# Patient Record
Sex: MALE | Race: BLACK OR AFRICAN AMERICAN | NOT HISPANIC OR LATINO | Employment: OTHER | URBAN - NONMETROPOLITAN AREA
[De-identification: names, ages, dates, MRNs, and addresses within clinical notes are randomized per-mention and may not be internally consistent; named-entity substitution may affect disease eponyms.]

---

## 2024-11-19 DIAGNOSIS — R07.9 CHEST PAIN, UNSPECIFIED: Primary | ICD-10-CM

## 2024-12-06 ENCOUNTER — HOSPITAL ENCOUNTER (OUTPATIENT)
Dept: RADIOLOGY | Facility: HOSPITAL | Age: 74
Discharge: HOME | End: 2024-12-06
Attending: INTERNAL MEDICINE
Payer: MEDICARE

## 2024-12-06 ENCOUNTER — CLINICAL SUPPORT (OUTPATIENT)
Dept: CARDIOLOGY | Facility: HOSPITAL | Age: 74
End: 2024-12-06
Attending: INTERNAL MEDICINE
Payer: MEDICARE

## 2024-12-06 DIAGNOSIS — R07.9 CHEST PAIN, UNSPECIFIED: ICD-10-CM

## 2024-12-06 LAB
CV STRESS BASE HR: 69 BPM
DIASTOLIC BLOOD PRESSURE: 94 MMHG
OHS CV CPX 85 PERCENT MAX PREDICTED HEART RATE MALE: 124
OHS CV CPX MAX PREDICTED HEART RATE: 146
OHS CV CPX PATIENT IS FEMALE: 0
OHS CV CPX PATIENT IS MALE: 1
OHS CV CPX PEAK DIASTOLIC BLOOD PRESSURE: 92 MMHG
OHS CV CPX PEAK HEAR RATE: 113 BPM
OHS CV CPX PEAK RATE PRESSURE PRODUCT: NORMAL
OHS CV CPX PEAK SYSTOLIC BLOOD PRESSURE: 154 MMHG
OHS CV CPX PERCENT MAX PREDICTED HEART RATE ACHIEVED: 77
OHS CV CPX RATE PRESSURE PRODUCT PRESENTING: NORMAL
SYSTOLIC BLOOD PRESSURE: 164 MMHG

## 2024-12-06 PROCEDURE — A9500 TC99M SESTAMIBI: HCPCS | Performed by: INTERNAL MEDICINE

## 2024-12-06 PROCEDURE — 78452 HT MUSCLE IMAGE SPECT MULT: CPT

## 2024-12-06 PROCEDURE — 93017 CV STRESS TEST TRACING ONLY: CPT

## 2024-12-06 RX ORDER — TETRAKIS(2-METHOXYISOBUTYLISOCYANIDE)COPPER(I) TETRAFLUOROBORATE 1 MG/ML
28.5 INJECTION, POWDER, LYOPHILIZED, FOR SOLUTION INTRAVENOUS
Status: COMPLETED | OUTPATIENT
Start: 2024-12-06 | End: 2024-12-06

## 2024-12-06 RX ORDER — TETRAKIS(2-METHOXYISOBUTYLISOCYANIDE)COPPER(I) TETRAFLUOROBORATE 1 MG/ML
9.7 INJECTION, POWDER, LYOPHILIZED, FOR SOLUTION INTRAVENOUS
Status: COMPLETED | OUTPATIENT
Start: 2024-12-06 | End: 2024-12-06

## 2024-12-06 RX ADMIN — KIT FOR THE PREPARATION OF TECHNETIUM TC99M SESTAMIBI 9.7 MILLICURIE: 1 INJECTION, POWDER, LYOPHILIZED, FOR SOLUTION PARENTERAL at 08:12

## 2024-12-06 RX ADMIN — KIT FOR THE PREPARATION OF TECHNETIUM TC99M SESTAMIBI 28.5 MILLICURIE: 1 INJECTION, POWDER, LYOPHILIZED, FOR SOLUTION PARENTERAL at 09:12

## 2024-12-08 LAB
CV STRESS BASE HR: 69 BPM
DIASTOLIC BLOOD PRESSURE: 94 MMHG
NUC REST EJECTION FRACTION: 35
OHS CV CPX 85 PERCENT MAX PREDICTED HEART RATE MALE: 124
OHS CV CPX ESTIMATED METS: 6
OHS CV CPX MAX PREDICTED HEART RATE: 146
OHS CV CPX PATIENT IS FEMALE: 0
OHS CV CPX PATIENT IS MALE: 1
OHS CV CPX PEAK DIASTOLIC BLOOD PRESSURE: 92 MMHG
OHS CV CPX PEAK HEAR RATE: 113 BPM
OHS CV CPX PEAK RATE PRESSURE PRODUCT: NORMAL
OHS CV CPX PEAK SYSTOLIC BLOOD PRESSURE: 154 MMHG
OHS CV CPX PERCENT MAX PREDICTED HEART RATE ACHIEVED: 77
OHS CV CPX RATE PRESSURE PRODUCT PRESENTING: NORMAL
STRESS ECHO POST EXERCISE DUR MIN: 5 MINUTES
SYSTOLIC BLOOD PRESSURE: 164 MMHG

## 2024-12-31 ENCOUNTER — TELEPHONE (OUTPATIENT)
Dept: CARDIOLOGY | Facility: CLINIC | Age: 74
End: 2024-12-31
Payer: MEDICARE

## 2024-12-31 NOTE — TELEPHONE ENCOUNTER
Received requests and records from Dr. Ybarra' Staff to schedule patient for Heart Cath with Dr. Gomes  Will scan all records to Media

## 2025-01-02 NOTE — TELEPHONE ENCOUNTER
Mr. Bautista returned call to confirm scheduling Heart Cath with Dr. Gomes, answered questions and advised needs current Lab work  Patient will contact office and will tentatively schedule Heart cath for Monday at 11am

## 2025-01-03 ENCOUNTER — LAB VISIT (OUTPATIENT)
Dept: LAB | Facility: HOSPITAL | Age: 75
End: 2025-01-03
Attending: INTERNAL MEDICINE
Payer: MEDICARE

## 2025-01-03 DIAGNOSIS — R94.31 NONSPECIFIC ABNORMAL ELECTROCARDIOGRAM (ECG) (EKG): ICD-10-CM

## 2025-01-03 DIAGNOSIS — R06.02 SHORTNESS OF BREATH: Primary | ICD-10-CM

## 2025-01-03 LAB
ALBUMIN SERPL BCP-MCNC: 3.5 G/DL (ref 3.5–5.2)
ALP SERPL-CCNC: 109 U/L (ref 55–135)
ALT SERPL W/O P-5'-P-CCNC: 28 U/L (ref 10–44)
ANION GAP SERPL CALC-SCNC: 1 MMOL/L (ref 3–11)
APTT PPP: 26.8 SEC (ref 21–32)
AST SERPL-CCNC: 21 U/L (ref 10–40)
BASOPHILS # BLD AUTO: 0.05 K/UL (ref 0–0.2)
BASOPHILS NFR BLD: 0.7 % (ref 0–1.9)
BILIRUB SERPL-MCNC: 0.4 MG/DL (ref 0.1–1)
BUN SERPL-MCNC: 17 MG/DL (ref 8–23)
CALCIUM SERPL-MCNC: 9.8 MG/DL (ref 8.7–10.5)
CHLORIDE SERPL-SCNC: 106 MMOL/L (ref 95–110)
CO2 SERPL-SCNC: 33 MMOL/L (ref 23–29)
CREAT SERPL-MCNC: 1.9 MG/DL (ref 0.5–1.4)
DIFFERENTIAL METHOD BLD: ABNORMAL
EOSINOPHIL # BLD AUTO: 0.2 K/UL (ref 0–0.5)
EOSINOPHIL NFR BLD: 2.6 % (ref 0–8)
ERYTHROCYTE [DISTWIDTH] IN BLOOD BY AUTOMATED COUNT: 13.6 % (ref 11.5–14.5)
EST. GFR  (NO RACE VARIABLE): 36.6 ML/MIN/1.73 M^2
GLUCOSE SERPL-MCNC: 147 MG/DL (ref 70–110)
HCT VFR BLD AUTO: 44.3 % (ref 40–54)
HGB BLD-MCNC: 15 G/DL (ref 14–18)
IMM GRANULOCYTES # BLD AUTO: 0.01 K/UL (ref 0–0.04)
IMM GRANULOCYTES NFR BLD AUTO: 0.1 % (ref 0–0.5)
INR PPP: 1 (ref 0.8–1.2)
LYMPHOCYTES # BLD AUTO: 1.9 K/UL (ref 1–4.8)
LYMPHOCYTES NFR BLD: 24.2 % (ref 18–48)
MCH RBC QN AUTO: 34.2 PG (ref 27–31)
MCHC RBC AUTO-ENTMCNC: 33.9 G/DL (ref 32–36)
MCV RBC AUTO: 101 FL (ref 82–98)
MONOCYTES # BLD AUTO: 0.4 K/UL (ref 0.3–1)
MONOCYTES NFR BLD: 5.8 % (ref 4–15)
NEUTROPHILS # BLD AUTO: 5.1 K/UL (ref 1.8–7.7)
NEUTROPHILS NFR BLD: 66.6 % (ref 38–73)
NRBC BLD-RTO: 0 /100 WBC
PLATELET # BLD AUTO: 209 K/UL (ref 150–450)
PMV BLD AUTO: 9.3 FL (ref 9.2–12.9)
POTASSIUM SERPL-SCNC: 3.7 MMOL/L (ref 3.5–5.1)
PROT SERPL-MCNC: 6.9 G/DL (ref 6–8.4)
PROTHROMBIN TIME: 10.5 SEC (ref 9–12.5)
RBC # BLD AUTO: 4.38 M/UL (ref 4.6–6.2)
SODIUM SERPL-SCNC: 140 MMOL/L (ref 136–145)
WBC # BLD AUTO: 7.63 K/UL (ref 3.9–12.7)

## 2025-01-03 PROCEDURE — 85730 THROMBOPLASTIN TIME PARTIAL: CPT | Performed by: INTERNAL MEDICINE

## 2025-01-03 PROCEDURE — 80053 COMPREHEN METABOLIC PANEL: CPT | Performed by: INTERNAL MEDICINE

## 2025-01-03 PROCEDURE — 85610 PROTHROMBIN TIME: CPT | Performed by: INTERNAL MEDICINE

## 2025-01-03 PROCEDURE — 85025 COMPLETE CBC W/AUTO DIFF WBC: CPT | Performed by: INTERNAL MEDICINE

## 2025-01-03 PROCEDURE — 36415 COLL VENOUS BLD VENIPUNCTURE: CPT | Performed by: INTERNAL MEDICINE

## 2025-01-03 RX ORDER — METHIMAZOLE 5 MG/1
10 TABLET ORAL DAILY
COMMUNITY
Start: 2024-11-02

## 2025-01-03 RX ORDER — ASPIRIN 81 MG/1
81 TABLET ORAL DAILY
COMMUNITY

## 2025-01-03 RX ORDER — NITROGLYCERIN 400 UG/1
1 SPRAY ORAL EVERY 5 MIN PRN
COMMUNITY
Start: 2024-12-23

## 2025-01-03 RX ORDER — NIFEDIPINE 60 MG/1
60 TABLET, EXTENDED RELEASE ORAL EVERY MORNING
Status: ON HOLD | COMMUNITY
Start: 2024-12-02 | End: 2025-01-11 | Stop reason: HOSPADM

## 2025-01-03 RX ORDER — NIFEDIPINE 30 MG/1
30 TABLET, EXTENDED RELEASE ORAL NIGHTLY
COMMUNITY
Start: 2024-12-21

## 2025-01-03 RX ORDER — METOPROLOL SUCCINATE 50 MG/1
50 TABLET, EXTENDED RELEASE ORAL DAILY
COMMUNITY
Start: 2024-12-02

## 2025-01-03 RX ORDER — HYDRALAZINE HYDROCHLORIDE 25 MG/1
25 TABLET, FILM COATED ORAL 3 TIMES DAILY
Status: ON HOLD | COMMUNITY
Start: 2024-11-01 | End: 2025-01-11 | Stop reason: HOSPADM

## 2025-01-03 RX ORDER — ISOSORBIDE DINITRATE AND HYDRALAZINE HYDROCHLORIDE 37.5; 2 MG/1; MG/1
1 TABLET ORAL 3 TIMES DAILY
COMMUNITY
Start: 2024-12-17

## 2025-01-03 RX ORDER — ISOSORBIDE MONONITRATE 30 MG/1
30 TABLET, EXTENDED RELEASE ORAL DAILY
Status: ON HOLD | COMMUNITY
Start: 2024-12-11 | End: 2025-01-11 | Stop reason: HOSPADM

## 2025-01-03 RX ORDER — LOSARTAN POTASSIUM 100 MG/1
100 TABLET ORAL DAILY
COMMUNITY
Start: 2024-12-25

## 2025-01-03 RX ORDER — MULTIVITAMIN WITH IRON
1 TABLET ORAL DAILY
COMMUNITY

## 2025-01-03 NOTE — TELEPHONE ENCOUNTER
Left voice mail for call back to review arrival time and stress drinking extra water over the weekend  Arrival time 830am -930am at the latest

## 2025-01-05 PROBLEM — Z95.5 PRESENCE OF DRUG COATED STENT IN LAD CORONARY ARTERY: Status: ACTIVE | Noted: 2025-01-05

## 2025-01-05 PROBLEM — R94.39 ABNORMAL NUCLEAR STRESS TEST: Status: ACTIVE | Noted: 2025-01-05

## 2025-01-05 PROBLEM — I65.23 BILATERAL CAROTID ARTERY STENOSIS: Status: ACTIVE | Noted: 2025-01-05

## 2025-01-05 PROBLEM — E78.49 OTHER HYPERLIPIDEMIA: Status: ACTIVE | Noted: 2025-01-05

## 2025-01-05 PROBLEM — I25.10 CORONARY ARTERY DISEASE INVOLVING NATIVE CORONARY ARTERY OF NATIVE HEART WITHOUT ANGINA PECTORIS: Status: ACTIVE | Noted: 2025-01-05

## 2025-01-05 PROBLEM — I10 PRIMARY HYPERTENSION: Status: ACTIVE | Noted: 2025-01-05

## 2025-01-06 ENCOUNTER — HOSPITAL ENCOUNTER (INPATIENT)
Facility: HOSPITAL | Age: 75
LOS: 3 days | Discharge: HOME HEALTH | DRG: 234 | End: 2025-01-11
Attending: INTERNAL MEDICINE | Admitting: THORACIC SURGERY (CARDIOTHORACIC VASCULAR SURGERY)
Payer: MEDICARE

## 2025-01-06 DIAGNOSIS — Z98.890 POST-OPERATIVE STATE: ICD-10-CM

## 2025-01-06 DIAGNOSIS — I25.10 CAD, MULTIPLE VESSEL: ICD-10-CM

## 2025-01-06 DIAGNOSIS — R94.31 ABNORMAL EKG: ICD-10-CM

## 2025-01-06 DIAGNOSIS — I47.10 PSVT (PAROXYSMAL SUPRAVENTRICULAR TACHYCARDIA): ICD-10-CM

## 2025-01-06 DIAGNOSIS — I25.110 CORONARY ARTERY DISEASE INVOLVING NATIVE CORONARY ARTERY OF NATIVE HEART WITH UNSTABLE ANGINA PECTORIS: ICD-10-CM

## 2025-01-06 DIAGNOSIS — Z95.5 S/P CORONARY ARTERY STENT PLACEMENT: ICD-10-CM

## 2025-01-06 DIAGNOSIS — Z95.1 S/P CABG X 4: ICD-10-CM

## 2025-01-06 DIAGNOSIS — Z01.810 PRE-OPERATIVE CARDIOVASCULAR EXAMINATION: ICD-10-CM

## 2025-01-06 DIAGNOSIS — I10 HYPERTENSION, UNSPECIFIED TYPE: ICD-10-CM

## 2025-01-06 DIAGNOSIS — R94.39 ABNORMAL NUCLEAR STRESS TEST: Primary | ICD-10-CM

## 2025-01-06 DIAGNOSIS — I48.91 A-FIB: ICD-10-CM

## 2025-01-06 DIAGNOSIS — I48.0 PAF (PAROXYSMAL ATRIAL FIBRILLATION): ICD-10-CM

## 2025-01-06 DIAGNOSIS — Z98.61 S/P PTCA (PERCUTANEOUS TRANSLUMINAL CORONARY ANGIOPLASTY): ICD-10-CM

## 2025-01-06 DIAGNOSIS — R94.31 ABNORMAL HOLTER MONITOR FINDING: ICD-10-CM

## 2025-01-06 LAB
ANION GAP SERPL CALC-SCNC: 12 MMOL/L (ref 8–16)
APTT PPP: 27.4 SEC (ref 21–32)
BUN SERPL-MCNC: 21 MG/DL (ref 8–23)
CALCIUM SERPL-MCNC: 10.4 MG/DL (ref 8.7–10.5)
CATH EF QUANTITATIVE: 60 %
CHLORIDE SERPL-SCNC: 106 MMOL/L (ref 95–110)
CO2 SERPL-SCNC: 22 MMOL/L (ref 23–29)
CREAT SERPL-MCNC: 1.8 MG/DL (ref 0.5–1.4)
EST. GFR  (NO RACE VARIABLE): 39 ML/MIN/1.73 M^2
GLUCOSE SERPL-MCNC: 115 MG/DL (ref 70–110)
OHS QRS DURATION: 108 MS
OHS QTC CALCULATION: 431 MS
POTASSIUM SERPL-SCNC: 3.7 MMOL/L (ref 3.5–5.1)
SODIUM SERPL-SCNC: 140 MMOL/L (ref 136–145)

## 2025-01-06 PROCEDURE — C1887 CATHETER, GUIDING: HCPCS | Performed by: INTERNAL MEDICINE

## 2025-01-06 PROCEDURE — 80048 BASIC METABOLIC PNL TOTAL CA: CPT | Performed by: INTERNAL MEDICINE

## 2025-01-06 PROCEDURE — 63600175 PHARM REV CODE 636 W HCPCS: Performed by: INTERNAL MEDICINE

## 2025-01-06 PROCEDURE — 99152 MOD SED SAME PHYS/QHP 5/>YRS: CPT | Performed by: INTERNAL MEDICINE

## 2025-01-06 PROCEDURE — 93459 L HRT ART/GRFT ANGIO: CPT | Performed by: INTERNAL MEDICINE

## 2025-01-06 PROCEDURE — 25000003 PHARM REV CODE 250: Performed by: INTERNAL MEDICINE

## 2025-01-06 PROCEDURE — 4A023N7 MEASUREMENT OF CARDIAC SAMPLING AND PRESSURE, LEFT HEART, PERCUTANEOUS APPROACH: ICD-10-PCS | Performed by: INTERNAL MEDICINE

## 2025-01-06 PROCEDURE — 25500020 PHARM REV CODE 255: Performed by: INTERNAL MEDICINE

## 2025-01-06 PROCEDURE — 36415 COLL VENOUS BLD VENIPUNCTURE: CPT | Performed by: THORACIC SURGERY (CARDIOTHORACIC VASCULAR SURGERY)

## 2025-01-06 PROCEDURE — 27000221 HC OXYGEN, UP TO 24 HOURS

## 2025-01-06 PROCEDURE — S4991 NICOTINE PATCH NONLEGEND: HCPCS | Performed by: INTERNAL MEDICINE

## 2025-01-06 PROCEDURE — 99152 MOD SED SAME PHYS/QHP 5/>YRS: CPT | Mod: ,,, | Performed by: INTERNAL MEDICINE

## 2025-01-06 PROCEDURE — C1894 INTRO/SHEATH, NON-LASER: HCPCS | Performed by: INTERNAL MEDICINE

## 2025-01-06 PROCEDURE — 85730 THROMBOPLASTIN TIME PARTIAL: CPT | Performed by: THORACIC SURGERY (CARDIOTHORACIC VASCULAR SURGERY)

## 2025-01-06 PROCEDURE — 94761 N-INVAS EAR/PLS OXIMETRY MLT: CPT

## 2025-01-06 PROCEDURE — 93459 L HRT ART/GRFT ANGIO: CPT | Mod: 26,,, | Performed by: INTERNAL MEDICINE

## 2025-01-06 PROCEDURE — C1769 GUIDE WIRE: HCPCS | Performed by: INTERNAL MEDICINE

## 2025-01-06 PROCEDURE — 99153 MOD SED SAME PHYS/QHP EA: CPT | Performed by: INTERNAL MEDICINE

## 2025-01-06 PROCEDURE — B3121ZZ FLUOROSCOPY OF LEFT SUBCLAVIAN ARTERY USING LOW OSMOLAR CONTRAST: ICD-10-PCS | Performed by: INTERNAL MEDICINE

## 2025-01-06 PROCEDURE — B2111ZZ FLUOROSCOPY OF MULTIPLE CORONARY ARTERIES USING LOW OSMOLAR CONTRAST: ICD-10-PCS | Performed by: INTERNAL MEDICINE

## 2025-01-06 RX ORDER — HEPARIN SODIUM 1000 [USP'U]/ML
INJECTION, SOLUTION INTRAVENOUS; SUBCUTANEOUS
Status: DISCONTINUED | OUTPATIENT
Start: 2025-01-06 | End: 2025-01-06

## 2025-01-06 RX ORDER — LABETALOL HYDROCHLORIDE 5 MG/ML
10 INJECTION, SOLUTION INTRAVENOUS ONCE
Status: COMPLETED | OUTPATIENT
Start: 2025-01-06 | End: 2025-01-06

## 2025-01-06 RX ORDER — HYDRALAZINE HYDROCHLORIDE 20 MG/ML
10 INJECTION INTRAMUSCULAR; INTRAVENOUS ONCE
Status: COMPLETED | OUTPATIENT
Start: 2025-01-06 | End: 2025-01-06

## 2025-01-06 RX ORDER — NITROGLYCERIN 5 MG/ML
INJECTION, SOLUTION INTRAVENOUS
Status: DISCONTINUED | OUTPATIENT
Start: 2025-01-06 | End: 2025-01-06

## 2025-01-06 RX ORDER — ASPIRIN 81 MG/1
81 TABLET ORAL DAILY
Status: DISCONTINUED | OUTPATIENT
Start: 2025-01-07 | End: 2025-01-07 | Stop reason: SDUPTHER

## 2025-01-06 RX ORDER — ISOSORBIDE MONONITRATE 30 MG/1
30 TABLET, EXTENDED RELEASE ORAL DAILY
Status: DISCONTINUED | OUTPATIENT
Start: 2025-01-07 | End: 2025-01-09

## 2025-01-06 RX ORDER — LIDOCAINE HYDROCHLORIDE 20 MG/ML
INJECTION, SOLUTION INFILTRATION; PERINEURAL
Status: DISCONTINUED | OUTPATIENT
Start: 2025-01-06 | End: 2025-01-06

## 2025-01-06 RX ORDER — METHIMAZOLE 5 MG/1
10 TABLET ORAL DAILY
Status: DISCONTINUED | OUTPATIENT
Start: 2025-01-07 | End: 2025-01-11 | Stop reason: HOSPADM

## 2025-01-06 RX ORDER — MIDAZOLAM HYDROCHLORIDE 1 MG/ML
INJECTION, SOLUTION INTRAMUSCULAR; INTRAVENOUS
Status: DISCONTINUED | OUTPATIENT
Start: 2025-01-06 | End: 2025-01-06

## 2025-01-06 RX ORDER — DIPHENHYDRAMINE HCL 50 MG
50 CAPSULE ORAL ONCE
Status: COMPLETED | OUTPATIENT
Start: 2025-01-06 | End: 2025-01-06

## 2025-01-06 RX ORDER — HYDRALAZINE HYDROCHLORIDE 25 MG/1
25 TABLET, FILM COATED ORAL 3 TIMES DAILY
Status: DISCONTINUED | OUTPATIENT
Start: 2025-01-06 | End: 2025-01-09

## 2025-01-06 RX ORDER — ONDANSETRON 8 MG/1
8 TABLET, ORALLY DISINTEGRATING ORAL EVERY 8 HOURS PRN
Status: DISCONTINUED | OUTPATIENT
Start: 2025-01-06 | End: 2025-01-11 | Stop reason: HOSPADM

## 2025-01-06 RX ORDER — NITROGLYCERIN 400 UG/1
1 SPRAY ORAL EVERY 5 MIN PRN
Status: DISCONTINUED | OUTPATIENT
Start: 2025-01-06 | End: 2025-01-11 | Stop reason: HOSPADM

## 2025-01-06 RX ORDER — VERAPAMIL HYDROCHLORIDE 2.5 MG/ML
INJECTION, SOLUTION INTRAVENOUS
Status: DISCONTINUED | OUTPATIENT
Start: 2025-01-06 | End: 2025-01-06

## 2025-01-06 RX ORDER — SODIUM CHLORIDE 9 MG/ML
INJECTION, SOLUTION INTRAVENOUS CONTINUOUS
Status: DISCONTINUED | OUTPATIENT
Start: 2025-01-06 | End: 2025-01-06

## 2025-01-06 RX ORDER — SODIUM CHLORIDE 0.9 % (FLUSH) 0.9 %
10 SYRINGE (ML) INJECTION
Status: DISCONTINUED | OUTPATIENT
Start: 2025-01-06 | End: 2025-01-11 | Stop reason: HOSPADM

## 2025-01-06 RX ORDER — IBUPROFEN 200 MG
1 TABLET ORAL DAILY
Status: DISCONTINUED | OUTPATIENT
Start: 2025-01-07 | End: 2025-01-06

## 2025-01-06 RX ORDER — SODIUM CHLORIDE 9 MG/ML
INJECTION, SOLUTION INTRAVENOUS CONTINUOUS
Status: DISCONTINUED | OUTPATIENT
Start: 2025-01-06 | End: 2025-01-07

## 2025-01-06 RX ORDER — ACETAMINOPHEN 325 MG/1
650 TABLET ORAL EVERY 4 HOURS PRN
Status: DISCONTINUED | OUTPATIENT
Start: 2025-01-06 | End: 2025-01-07 | Stop reason: SDUPTHER

## 2025-01-06 RX ORDER — IBUPROFEN 200 MG
1 TABLET ORAL DAILY
Status: COMPLETED | OUTPATIENT
Start: 2025-01-06 | End: 2025-01-07

## 2025-01-06 RX ORDER — NIFEDIPINE 30 MG/1
60 TABLET, EXTENDED RELEASE ORAL DAILY
Status: DISCONTINUED | OUTPATIENT
Start: 2025-01-07 | End: 2025-01-09

## 2025-01-06 RX ORDER — LOSARTAN POTASSIUM 50 MG/1
100 TABLET ORAL DAILY
Status: DISCONTINUED | OUTPATIENT
Start: 2025-01-07 | End: 2025-01-09

## 2025-01-06 RX ORDER — METOPROLOL SUCCINATE 50 MG/1
50 TABLET, EXTENDED RELEASE ORAL DAILY
Status: DISCONTINUED | OUTPATIENT
Start: 2025-01-07 | End: 2025-01-07 | Stop reason: SDUPTHER

## 2025-01-06 RX ORDER — FENTANYL CITRATE 50 UG/ML
INJECTION, SOLUTION INTRAMUSCULAR; INTRAVENOUS
Status: DISCONTINUED | OUTPATIENT
Start: 2025-01-06 | End: 2025-01-06

## 2025-01-06 RX ORDER — NAPROXEN SODIUM 220 MG/1
81 TABLET, FILM COATED ORAL ONCE
Status: COMPLETED | OUTPATIENT
Start: 2025-01-06 | End: 2025-01-06

## 2025-01-06 RX ADMIN — ASPIRIN 81 MG CHEWABLE TABLET 81 MG: 81 TABLET CHEWABLE at 09:01

## 2025-01-06 RX ADMIN — NICOTINE 1 PATCH: 14 PATCH, EXTENDED RELEASE TRANSDERMAL at 11:01

## 2025-01-06 RX ADMIN — SODIUM CHLORIDE: 9 INJECTION, SOLUTION INTRAVENOUS at 09:01

## 2025-01-06 RX ADMIN — LABETALOL HYDROCHLORIDE 10 MG: 5 INJECTION INTRAVENOUS at 05:01

## 2025-01-06 RX ADMIN — DIPHENHYDRAMINE HYDROCHLORIDE 50 MG: 50 CAPSULE ORAL at 09:01

## 2025-01-06 RX ADMIN — SODIUM CHLORIDE: 9 INJECTION, SOLUTION INTRAVENOUS at 06:01

## 2025-01-06 RX ADMIN — HYDRALAZINE HYDROCHLORIDE 25 MG: 25 TABLET ORAL at 09:01

## 2025-01-06 RX ADMIN — HYDRALAZINE HYDROCHLORIDE 10 MG: 20 INJECTION INTRAMUSCULAR; INTRAVENOUS at 05:01

## 2025-01-06 NOTE — OP NOTE
INPATIENT Operative Note         SUMMARY     Surgery Date: 1/6/2025     Surgeons and Role:     * Erika Gomes MD - Primary    ASSISTANT:    Pre-op Diagnosis:  Abnormal nuclear stress test [R94.39]  Abnormal EKG [R94.31]  Coronary artery disease involving native coronary artery of native heart with unstable angina pectoris [I25.110]  S/P PTCA (percutaneous transluminal coronary angioplasty) [Z98.61]  S/P coronary artery stent placement [Z95.5]  PSVT (paroxysmal supraventricular tachycardia) [I47.10]  Abnormal Holter monitor finding [R94.31]  CAD, multiple vessel [I25.10]  Hypertension, unspecified type [I10]      Post-op Diagnosis:  Abnormal nuclear stress test [R94.39]  Abnormal EKG [R94.31]  Coronary artery disease involving native coronary artery of native heart with unstable angina pectoris [I25.110]  S/P PTCA (percutaneous transluminal coronary angioplasty) [Z98.61]  S/P coronary artery stent placement [Z95.5]  PSVT (paroxysmal supraventricular tachycardia) [I47.10]  Abnormal Holter monitor finding [R94.31]  CAD, multiple vessel [I25.10]  Hypertension, unspecified type [I10]    Procedure(s) (LRB):  Left heart cath (Left)  Angiogram Internal Mammary (Left)  ARTERIOGRAM, SUBCLAVIAN (Left)    COMPLICATION:none    Anesthesia: RN IV Sedation    Findings/Key Components:  Severe 3 vessel cad normal lvf.      Estimated Blood Loss: < 50 ML.         SPECIMEN: NONE    Devices/Prostetics: None    PLAN: cabg

## 2025-01-06 NOTE — H&P
Admit Note  Cardiology      SUBJECTIVE:     History of Present Illness:  Patient is a 74 y.o. male presents with H/O CAD HTN HLP TOBACCO USE PALPITATION NSVT BY HOLTER HAD AN ABNORMAL CARDIOLITE .WITH SIGNIFICANT DEFECT IN LAD DISTRIBUTION. HE WAS REFERED FOR CATH DY DR GOOD.    Past Medical History:   Diagnosis Date    Abnormal nuclear stress test 01/05/2025    Bilateral carotid artery stenosis 01/05/2025    CAD S/P percutaneous coronary angioplasty 2003    Stent mid LAD July 15, 2003    Carotid artery disease     Coronary artery disease involving native coronary artery of native heart without angina pectoris 01/05/2025    Diastolic dysfunction     Essential (primary) hypertension     Resistent    Graves disease     hyperthroidism    Hyperlipidemia     Hypertensive heart disease     Other hyperlipidemia 01/05/2025    Presence of drug coated stent in LAD coronary artery 01/05/2025    Primary hypertension 01/05/2025     Past Surgical History:   Procedure Laterality Date    ptca with Stent mid LAD in 2003 N/A      No family history on file.        Review of patient's allergies indicates:  No Known Allergies    Current Facility-Administered Medications   Medication    sodium chloride 0.9% flush 10 mL     No current facility-administered medications on file prior to encounter.     No current outpatient medications on file prior to encounter.         Review of Systems:  Constitutional: negative  Eyes: negative  Ears, nose, mouth, throat, and face: negative  Respiratory: negative  Cardiovascular: PALPITATION  Gastrointestinal: negative  Genitourinary:negative  Hematologic/lymphatic: negative  Musculoskeletal:negative,   Neurological: negative  Behavioral/Psych: negative  Endocrine: negative  Allergic/Immunologic: negative    OBJECTIVE:     Vital Signs (Most Recent)  Temp: 98.1 °F (36.7 °C) (01/06/25 0917)  Pulse: 75 (01/06/25 0917)  Resp: 17 (01/06/25 0917)  BP: (!) 165/80 (01/06/25 0917)  SpO2: 96 % (01/06/25  "0917)    Vital Signs Range (Last 24H):  Temp:  [98.1 °F (36.7 °C)]   Pulse:  [75]   Resp:  [17]   BP: (165)/(80)   SpO2:  [96 %]     Physical Exam:  General appearance: alert, appears stated age and cooperative  Head: Normocephalic, without obvious abnormality, atraumatic  Eyes:  conjunctivae/corneas clear. PERRL, EOM's intact.   Nose: no discharge  Throat: normal findings: tongue midline and normal  Neck: no adenopathy, no carotid bruit, no JVD, supple, symmetrical, trachea midline and thyroid not enlarged, symmetric, no tenderness/mass/nodules  Back:  no skin lesions, erythema, or scars  Lungs:  clear to auscultation bilaterally  Chest wall: no tenderness  Heart: regular rate and rhythm, S1, S2 normal, no murmur, click, rub or gallop  Abdomen: soft, non-tender; bowel sounds normal; no masses,  no organomegaly  Extremities: extremities normal, atraumatic, no cyanosis or edema  Pulses: 2+ and symmetric  Skin: Skin color, texture, turgor normal. No rashes or lesions  Neurologic: Grossly normal    Laboratory:  Chemistry:   Lab Results   Component Value Date     01/06/2025    K 3.7 01/06/2025     01/06/2025    CO2 22 (L) 01/06/2025    BUN 21 01/06/2025    CREATININE 1.8 (H) 01/06/2025    CALCIUM 10.4 01/06/2025     Cardiac Markers: No results found for: "CKTOTAL", "CKMB", "CKMBINDEX", "TROPONINI"  Cardiac Markers (Last 3): No results found for: "CKTOTAL", "CKMB", "CKMBINDEX", "TROPONINI"  CBC:   Lab Results   Component Value Date    WBC 7.63 01/03/2025    HGB 15.0 01/03/2025    HCT 44.3 01/03/2025     (H) 01/03/2025     01/03/2025     Lipids: No results found for: "CHOL", "TRIG", "HDL", "LDLDIRECT"  Coagulation:   Lab Results   Component Value Date    INR 1.0 01/03/2025    APTT 26.8 01/03/2025       Diagnostic Results:  ECG: Reviewed  X-Ray: Reviewed  US: Reviewed  CT: Reviewed  Echo: Reviewed      ASSESSMENT/PLAN:     Patient Active Problem List   Diagnosis    Abnormal nuclear stress test    " Coronary artery disease involving native coronary artery of native heart without angina pectoris    Primary hypertension    Other hyperlipidemia    Bilateral carotid artery stenosis    Presence of drug coated stent in LAD coronary artery        Plan: LHC/PTCA  I have explained the risks, benefits , and alternatives of the procedure in detail.the patient voices understanding and all questions have been answered.the patient agrees to proceed as planned. .

## 2025-01-06 NOTE — Clinical Note
The catheter was inserted over the wire into the ostium   right coronary artery. The catheter was unable to engage the area..

## 2025-01-06 NOTE — Clinical Note
The catheter was inserted over the wire into the left ventricle. The angiography was performed via power injection. The injected amount was 25 mL contrast at 12 mL/s.

## 2025-01-07 ENCOUNTER — ANESTHESIA EVENT (OUTPATIENT)
Dept: SURGERY | Facility: HOSPITAL | Age: 75
End: 2025-01-07
Payer: MEDICARE

## 2025-01-07 ENCOUNTER — ANESTHESIA (OUTPATIENT)
Dept: SURGERY | Facility: HOSPITAL | Age: 75
End: 2025-01-07
Payer: MEDICARE

## 2025-01-07 PROBLEM — E05.00 GRAVES' DISEASE: Status: ACTIVE | Noted: 2025-01-07

## 2025-01-07 PROBLEM — I25.10 CAD, MULTIPLE VESSEL: Status: ACTIVE | Noted: 2025-01-07

## 2025-01-07 PROBLEM — F17.200 CURRENTLY SMOKES TOBACCO: Status: ACTIVE | Noted: 2025-01-07

## 2025-01-07 PROBLEM — Z95.1 S/P CABG X 4: Status: ACTIVE | Noted: 2025-01-07

## 2025-01-07 PROBLEM — Z99.11 ON MECHANICALLY ASSISTED VENTILATION: Status: ACTIVE | Noted: 2025-01-07

## 2025-01-07 PROBLEM — N18.32 STAGE 3B CHRONIC KIDNEY DISEASE: Status: ACTIVE | Noted: 2025-01-07

## 2025-01-07 LAB
ABO + RH BLD: NORMAL
ALBUMIN SERPL BCP-MCNC: 2.4 G/DL (ref 3.5–5.2)
ALBUMIN SERPL BCP-MCNC: 3.4 G/DL (ref 3.5–5.2)
ALLENS TEST: ABNORMAL
ALLENS TEST: ABNORMAL
ALP SERPL-CCNC: 61 U/L (ref 40–150)
ALP SERPL-CCNC: 92 U/L (ref 40–150)
ALT SERPL W/O P-5'-P-CCNC: 17 U/L (ref 10–44)
ALT SERPL W/O P-5'-P-CCNC: 20 U/L (ref 10–44)
ANION GAP SERPL CALC-SCNC: 10 MMOL/L (ref 8–16)
ANION GAP SERPL CALC-SCNC: 10 MMOL/L (ref 8–16)
ANION GAP SERPL CALC-SCNC: 8 MMOL/L (ref 8–16)
APTT PPP: 25.7 SEC (ref 21–32)
AST SERPL-CCNC: 28 U/L (ref 10–40)
AST SERPL-CCNC: 43 U/L (ref 10–40)
BASOPHILS # BLD AUTO: 0.04 K/UL (ref 0–0.2)
BASOPHILS # BLD AUTO: 0.04 K/UL (ref 0–0.2)
BASOPHILS NFR BLD: 0.6 % (ref 0–1.9)
BASOPHILS NFR BLD: 0.6 % (ref 0–1.9)
BILIRUB SERPL-MCNC: 0.4 MG/DL (ref 0.1–1)
BILIRUB SERPL-MCNC: 0.6 MG/DL (ref 0.1–1)
BLD GP AB SCN CELLS X3 SERPL QL: NORMAL
BRPFT: NORMAL
BUN SERPL-MCNC: 18 MG/DL (ref 8–23)
BUN SERPL-MCNC: 18 MG/DL (ref 8–23)
BUN SERPL-MCNC: 20 MG/DL (ref 8–23)
CALCIUM SERPL-MCNC: 9.6 MG/DL (ref 8.7–10.5)
CALCIUM SERPL-MCNC: 9.7 MG/DL (ref 8.7–10.5)
CALCIUM SERPL-MCNC: 9.8 MG/DL (ref 8.7–10.5)
CHLORIDE SERPL-SCNC: 106 MMOL/L (ref 95–110)
CHLORIDE SERPL-SCNC: 107 MMOL/L (ref 95–110)
CHLORIDE SERPL-SCNC: 109 MMOL/L (ref 95–110)
CITRATED FUNCTIONAL FIBRINOGEN FLEV: 330.3 MG/DL
CITRATED FUNCTIONAL FIBRINOGEN FLEV: 341.2 MG/DL
CITRATED FUNCTIONAL FIBRINOGEN MA: 18.1 MM
CITRATED FUNCTIONAL FIBRINOGEN MA: 18.7 MM
CITRATED KAOLIN ANGLE: 72.8 DEG
CITRATED KAOLIN ANGLE: 73.7 DEG
CITRATED KAOLIN HEPARINASE R: 5.1 MIN
CITRATED KAOLIN HEPARINASE R: 6.2 MIN
CITRATED KAOLIN K: 1.3 MIN
CITRATED KAOLIN K: 1.3 MIN
CITRATED KAOLIN MA: 59 MM
CITRATED KAOLIN MA: 60 MM
CITRATED KAOLIN R: 4.5 MIN
CITRATED KAOLIN R: 6.8 MIN
CITRATED RAPID TEG MA: 57.9 MM
CITRATED RAPID TEG MA: 60.1 MM
CO2 SERPL-SCNC: 22 MMOL/L (ref 23–29)
CO2 SERPL-SCNC: 24 MMOL/L (ref 23–29)
CO2 SERPL-SCNC: 24 MMOL/L (ref 23–29)
CREAT SERPL-MCNC: 1.6 MG/DL (ref 0.5–1.4)
CREAT SERPL-MCNC: 1.6 MG/DL (ref 0.5–1.4)
CREAT SERPL-MCNC: 1.7 MG/DL (ref 0.5–1.4)
DELSYS: ABNORMAL
DELSYS: ABNORMAL
DIFFERENTIAL METHOD BLD: ABNORMAL
DIFFERENTIAL METHOD BLD: ABNORMAL
EOSINOPHIL # BLD AUTO: 0.1 K/UL (ref 0–0.5)
EOSINOPHIL # BLD AUTO: 0.1 K/UL (ref 0–0.5)
EOSINOPHIL NFR BLD: 1.5 % (ref 0–8)
EOSINOPHIL NFR BLD: 1.7 % (ref 0–8)
ERYTHROCYTE [DISTWIDTH] IN BLOOD BY AUTOMATED COUNT: 13.6 % (ref 11.5–14.5)
ERYTHROCYTE [DISTWIDTH] IN BLOOD BY AUTOMATED COUNT: 13.6 % (ref 11.5–14.5)
ERYTHROCYTE [DISTWIDTH] IN BLOOD BY AUTOMATED COUNT: 13.9 % (ref 11.5–14.5)
ERYTHROCYTE [SEDIMENTATION RATE] IN BLOOD BY WESTERGREN METHOD: 16 MM/H
EST. GFR  (NO RACE VARIABLE): 42 ML/MIN/1.73 M^2
EST. GFR  (NO RACE VARIABLE): 45 ML/MIN/1.73 M^2
EST. GFR  (NO RACE VARIABLE): 45 ML/MIN/1.73 M^2
ESTIMATED AVG GLUCOSE: 100 MG/DL (ref 68–131)
FEF 25 75 LLN: 0.86
FEF 25 75 PRE REF: 42.9 %
FEF 25 75 REF: 2.57
FEV1 FVC LLN: 63
FEV1 FVC PRE REF: 88.6 %
FEV1 FVC REF: 77
FEV1 LLN: 1.66
FEV1 PRE REF: 83 %
FEV1 REF: 2.34
FIBRINOGEN PPP-MCNC: 251 MG/DL (ref 182–400)
FVC LLN: 2.24
FVC PRE REF: 93.4 %
FVC REF: 3.06
GLUCOSE SERPL-MCNC: 130 MG/DL (ref 70–110)
GLUCOSE SERPL-MCNC: 134 MG/DL (ref 70–110)
GLUCOSE SERPL-MCNC: 94 MG/DL (ref 70–110)
GLUCOSE SERPL-MCNC: 94 MG/DL (ref 70–110)
HBA1C MFR BLD: 5.1 % (ref 4–5.6)
HCO3 UR-SCNC: 23.9 MMOL/L (ref 24–28)
HCO3 UR-SCNC: 27.5 MMOL/L (ref 24–28)
HCT VFR BLD AUTO: 31.7 % (ref 40–54)
HCT VFR BLD AUTO: 41 % (ref 40–54)
HCT VFR BLD AUTO: 41.5 % (ref 40–54)
HCT VFR BLD CALC: 28 %PCV (ref 36–54)
HGB BLD-MCNC: 10.5 G/DL (ref 14–18)
HGB BLD-MCNC: 13.3 G/DL (ref 14–18)
HGB BLD-MCNC: 13.6 G/DL (ref 14–18)
IMM GRANULOCYTES # BLD AUTO: 0.01 K/UL (ref 0–0.04)
IMM GRANULOCYTES # BLD AUTO: 0.03 K/UL (ref 0–0.04)
IMM GRANULOCYTES NFR BLD AUTO: 0.1 % (ref 0–0.5)
IMM GRANULOCYTES NFR BLD AUTO: 0.4 % (ref 0–0.5)
INR PPP: 1 (ref 0.8–1.2)
INR PPP: 1.1 (ref 0.8–1.2)
LYMPHOCYTES # BLD AUTO: 1.6 K/UL (ref 1–4.8)
LYMPHOCYTES # BLD AUTO: 1.7 K/UL (ref 1–4.8)
LYMPHOCYTES NFR BLD: 22.9 % (ref 18–48)
LYMPHOCYTES NFR BLD: 23.7 % (ref 18–48)
MAGNESIUM SERPL-MCNC: 3.3 MG/DL (ref 1.6–2.6)
MCH RBC QN AUTO: 33.4 PG (ref 27–31)
MCH RBC QN AUTO: 33.8 PG (ref 27–31)
MCH RBC QN AUTO: 34.5 PG (ref 27–31)
MCHC RBC AUTO-ENTMCNC: 32.4 G/DL (ref 32–36)
MCHC RBC AUTO-ENTMCNC: 32.8 G/DL (ref 32–36)
MCHC RBC AUTO-ENTMCNC: 33.1 G/DL (ref 32–36)
MCV RBC AUTO: 103 FL (ref 82–98)
MCV RBC AUTO: 103 FL (ref 82–98)
MCV RBC AUTO: 104 FL (ref 82–98)
MIN VOL: 8.3
MODE: ABNORMAL
MONOCYTES # BLD AUTO: 0.7 K/UL (ref 0.3–1)
MONOCYTES # BLD AUTO: 0.7 K/UL (ref 0.3–1)
MONOCYTES NFR BLD: 9 % (ref 4–15)
MONOCYTES NFR BLD: 9.8 % (ref 4–15)
NEUTROPHILS # BLD AUTO: 4.6 K/UL (ref 1.8–7.7)
NEUTROPHILS # BLD AUTO: 4.7 K/UL (ref 1.8–7.7)
NEUTROPHILS NFR BLD: 64.8 % (ref 38–73)
NEUTROPHILS NFR BLD: 64.9 % (ref 38–73)
NRBC BLD-RTO: 0 /100 WBC
NRBC BLD-RTO: 0 /100 WBC
PCO2 BLDA: 38.8 MMHG (ref 35–45)
PCO2 BLDA: 54.5 MMHG (ref 35–45)
PEEP: 5
PEF LLN: 4.42
PEF PRE REF: 79.2 %
PEF REF: 6.24
PH SMN: 7.31 [PH] (ref 7.35–7.45)
PH SMN: 7.4 [PH] (ref 7.35–7.45)
PIP: 21
PLATELET # BLD AUTO: 147 K/UL (ref 150–450)
PLATELET # BLD AUTO: 188 K/UL (ref 150–450)
PLATELET # BLD AUTO: 191 K/UL (ref 150–450)
PMV BLD AUTO: 10.1 FL (ref 9.2–12.9)
PMV BLD AUTO: 9.7 FL (ref 9.2–12.9)
PMV BLD AUTO: 9.9 FL (ref 9.2–12.9)
PO2 BLDA: 188 MMHG (ref 80–100)
PO2 BLDA: 64 MMHG (ref 80–100)
POC BE: -1 MMOL/L
POC BE: 1 MMOL/L
POC IONIZED CALCIUM: 1.49 MMOL/L (ref 1.06–1.42)
POC SATURATED O2: 100 % (ref 95–100)
POC SATURATED O2: 92 % (ref 95–100)
POCT GLUCOSE: 130 MG/DL (ref 70–110)
POCT GLUCOSE: 145 MG/DL (ref 70–110)
POCT GLUCOSE: 155 MG/DL (ref 70–110)
POCT GLUCOSE: 207 MG/DL (ref 70–110)
POCT GLUCOSE: 234 MG/DL (ref 70–110)
POCT GLUCOSE: 89 MG/DL (ref 70–110)
POTASSIUM BLD-SCNC: 5 MMOL/L (ref 3.5–5.1)
POTASSIUM SERPL-SCNC: 3.9 MMOL/L (ref 3.5–5.1)
POTASSIUM SERPL-SCNC: 3.9 MMOL/L (ref 3.5–5.1)
POTASSIUM SERPL-SCNC: 5.2 MMOL/L (ref 3.5–5.1)
PRE FEF 25 75: 1.1 L/S (ref 0.86–4.28)
PRE FET 100: 7.25 SEC
PRE FEV1 FVC: 68.12 % (ref 62.63–89.58)
PRE FEV1: 1.94 L (ref 1.66–2.98)
PRE FVC: 2.85 L (ref 2.24–3.88)
PRE PEF: 4.94 L/S (ref 4.42–8.06)
PROT SERPL-MCNC: 4 G/DL (ref 6–8.4)
PROT SERPL-MCNC: 6 G/DL (ref 6–8.4)
PROTHROMBIN TIME: 11.4 SEC (ref 9–12.5)
PROTHROMBIN TIME: 11.9 SEC (ref 9–12.5)
RBC # BLD AUTO: 3.04 M/UL (ref 4.6–6.2)
RBC # BLD AUTO: 3.98 M/UL (ref 4.6–6.2)
RBC # BLD AUTO: 4.02 M/UL (ref 4.6–6.2)
SAMPLE: ABNORMAL
SAMPLE: ABNORMAL
SITE: ABNORMAL
SITE: ABNORMAL
SODIUM BLD-SCNC: 138 MMOL/L (ref 136–145)
SODIUM SERPL-SCNC: 139 MMOL/L (ref 136–145)
SODIUM SERPL-SCNC: 140 MMOL/L (ref 136–145)
SODIUM SERPL-SCNC: 141 MMOL/L (ref 136–145)
SP02: 100
SPECIMEN OUTDATE: NORMAL
VT: 470
WBC # BLD AUTO: 21.94 K/UL (ref 3.9–12.7)
WBC # BLD AUTO: 7.11 K/UL (ref 3.9–12.7)
WBC # BLD AUTO: 7.23 K/UL (ref 3.9–12.7)

## 2025-01-07 PROCEDURE — 36600 WITHDRAWAL OF ARTERIAL BLOOD: CPT

## 2025-01-07 PROCEDURE — 84295 ASSAY OF SERUM SODIUM: CPT

## 2025-01-07 PROCEDURE — 02100Z9 BYPASS CORONARY ARTERY, ONE ARTERY FROM LEFT INTERNAL MAMMARY, OPEN APPROACH: ICD-10-PCS | Performed by: THORACIC SURGERY (CARDIOTHORACIC VASCULAR SURGERY)

## 2025-01-07 PROCEDURE — 33519 CABG ARTERY-VEIN THREE: CPT | Mod: ,,, | Performed by: THORACIC SURGERY (CARDIOTHORACIC VASCULAR SURGERY)

## 2025-01-07 PROCEDURE — 63600175 PHARM REV CODE 636 W HCPCS: Performed by: THORACIC SURGERY (CARDIOTHORACIC VASCULAR SURGERY)

## 2025-01-07 PROCEDURE — 36000712 HC OR TIME LEV V 1ST 15 MIN: Performed by: THORACIC SURGERY (CARDIOTHORACIC VASCULAR SURGERY)

## 2025-01-07 PROCEDURE — 99214 OFFICE O/P EST MOD 30 MIN: CPT | Mod: ,,, | Performed by: INTERNAL MEDICINE

## 2025-01-07 PROCEDURE — 86920 COMPATIBILITY TEST SPIN: CPT | Performed by: THORACIC SURGERY (CARDIOTHORACIC VASCULAR SURGERY)

## 2025-01-07 PROCEDURE — 25000242 PHARM REV CODE 250 ALT 637 W/ HCPCS: Performed by: THORACIC SURGERY (CARDIOTHORACIC VASCULAR SURGERY)

## 2025-01-07 PROCEDURE — 85730 THROMBOPLASTIN TIME PARTIAL: CPT | Performed by: THORACIC SURGERY (CARDIOTHORACIC VASCULAR SURGERY)

## 2025-01-07 PROCEDURE — 25000003 PHARM REV CODE 250: Performed by: INTERNAL MEDICINE

## 2025-01-07 PROCEDURE — 25000003 PHARM REV CODE 250: Performed by: NURSE ANESTHETIST, CERTIFIED REGISTERED

## 2025-01-07 PROCEDURE — C1713 ANCHOR/SCREW BN/BN,TIS/BN: HCPCS | Performed by: THORACIC SURGERY (CARDIOTHORACIC VASCULAR SURGERY)

## 2025-01-07 PROCEDURE — 84132 ASSAY OF SERUM POTASSIUM: CPT

## 2025-01-07 PROCEDURE — 27800903 OPTIME MED/SURG SUP & DEVICES OTHER IMPLANTS: Performed by: THORACIC SURGERY (CARDIOTHORACIC VASCULAR SURGERY)

## 2025-01-07 PROCEDURE — 85014 HEMATOCRIT: CPT

## 2025-01-07 PROCEDURE — 80053 COMPREHEN METABOLIC PANEL: CPT | Mod: 91 | Performed by: THORACIC SURGERY (CARDIOTHORACIC VASCULAR SURGERY)

## 2025-01-07 PROCEDURE — 85576 BLOOD PLATELET AGGREGATION: CPT | Mod: 59 | Performed by: THORACIC SURGERY (CARDIOTHORACIC VASCULAR SURGERY)

## 2025-01-07 PROCEDURE — 37000008 HC ANESTHESIA 1ST 15 MINUTES: Performed by: THORACIC SURGERY (CARDIOTHORACIC VASCULAR SURGERY)

## 2025-01-07 PROCEDURE — P9045 ALBUMIN (HUMAN), 5%, 250 ML: HCPCS | Mod: JZ,TB | Performed by: THORACIC SURGERY (CARDIOTHORACIC VASCULAR SURGERY)

## 2025-01-07 PROCEDURE — 27100171 HC OXYGEN HIGH FLOW UP TO 24 HOURS

## 2025-01-07 PROCEDURE — C1898 LEAD, PMKR, OTHER THAN TRANS: HCPCS | Performed by: THORACIC SURGERY (CARDIOTHORACIC VASCULAR SURGERY)

## 2025-01-07 PROCEDURE — 94761 N-INVAS EAR/PLS OXIMETRY MLT: CPT

## 2025-01-07 PROCEDURE — 85610 PROTHROMBIN TIME: CPT | Mod: 91 | Performed by: THORACIC SURGERY (CARDIOTHORACIC VASCULAR SURGERY)

## 2025-01-07 PROCEDURE — 85027 COMPLETE CBC AUTOMATED: CPT | Performed by: THORACIC SURGERY (CARDIOTHORACIC VASCULAR SURGERY)

## 2025-01-07 PROCEDURE — 82330 ASSAY OF CALCIUM: CPT

## 2025-01-07 PROCEDURE — 25000003 PHARM REV CODE 250: Performed by: THORACIC SURGERY (CARDIOTHORACIC VASCULAR SURGERY)

## 2025-01-07 PROCEDURE — 83735 ASSAY OF MAGNESIUM: CPT | Performed by: THORACIC SURGERY (CARDIOTHORACIC VASCULAR SURGERY)

## 2025-01-07 PROCEDURE — 36000713 HC OR TIME LEV V EA ADD 15 MIN: Performed by: THORACIC SURGERY (CARDIOTHORACIC VASCULAR SURGERY)

## 2025-01-07 PROCEDURE — 85384 FIBRINOGEN ACTIVITY: CPT | Mod: 91 | Performed by: THORACIC SURGERY (CARDIOTHORACIC VASCULAR SURGERY)

## 2025-01-07 PROCEDURE — 27200667 HC PACEMAKER, TEMPORARY MONITORING, PER SHIFT

## 2025-01-07 PROCEDURE — 99900035 HC TECH TIME PER 15 MIN (STAT)

## 2025-01-07 PROCEDURE — 36415 COLL VENOUS BLD VENIPUNCTURE: CPT | Performed by: THORACIC SURGERY (CARDIOTHORACIC VASCULAR SURGERY)

## 2025-01-07 PROCEDURE — 27201423 OPTIME MED/SURG SUP & DEVICES STERILE SUPPLY: Performed by: THORACIC SURGERY (CARDIOTHORACIC VASCULAR SURGERY)

## 2025-01-07 PROCEDURE — S0017 INJECTION, AMINOCAPROIC ACID: HCPCS | Performed by: NURSE ANESTHETIST, CERTIFIED REGISTERED

## 2025-01-07 PROCEDURE — C1729 CATH, DRAINAGE: HCPCS | Performed by: THORACIC SURGERY (CARDIOTHORACIC VASCULAR SURGERY)

## 2025-01-07 PROCEDURE — 06BQ4ZZ EXCISION OF LEFT SAPHENOUS VEIN, PERCUTANEOUS ENDOSCOPIC APPROACH: ICD-10-PCS | Performed by: THORACIC SURGERY (CARDIOTHORACIC VASCULAR SURGERY)

## 2025-01-07 PROCEDURE — 86901 BLOOD TYPING SEROLOGIC RH(D): CPT | Performed by: THORACIC SURGERY (CARDIOTHORACIC VASCULAR SURGERY)

## 2025-01-07 PROCEDURE — 36415 COLL VENOUS BLD VENIPUNCTURE: CPT | Performed by: INTERNAL MEDICINE

## 2025-01-07 PROCEDURE — 94002 VENT MGMT INPAT INIT DAY: CPT

## 2025-01-07 PROCEDURE — 0212093 BYPASS CORONARY ARTERY, THREE ARTERIES FROM CORONARY ARTERY WITH AUTOLOGOUS VENOUS TISSUE, OPEN APPROACH: ICD-10-PCS | Performed by: THORACIC SURGERY (CARDIOTHORACIC VASCULAR SURGERY)

## 2025-01-07 PROCEDURE — 5A1221Z PERFORMANCE OF CARDIAC OUTPUT, CONTINUOUS: ICD-10-PCS | Performed by: THORACIC SURGERY (CARDIOTHORACIC VASCULAR SURGERY)

## 2025-01-07 PROCEDURE — 82565 ASSAY OF CREATININE: CPT

## 2025-01-07 PROCEDURE — 85347 COAGULATION TIME ACTIVATED: CPT

## 2025-01-07 PROCEDURE — 94640 AIRWAY INHALATION TREATMENT: CPT

## 2025-01-07 PROCEDURE — 83036 HEMOGLOBIN GLYCOSYLATED A1C: CPT | Performed by: THORACIC SURGERY (CARDIOTHORACIC VASCULAR SURGERY)

## 2025-01-07 PROCEDURE — 33508 ENDOSCOPIC VEIN HARVEST: CPT | Mod: 59,,, | Performed by: THORACIC SURGERY (CARDIOTHORACIC VASCULAR SURGERY)

## 2025-01-07 PROCEDURE — 82803 BLOOD GASES ANY COMBINATION: CPT

## 2025-01-07 PROCEDURE — 85025 COMPLETE CBC W/AUTO DIFF WBC: CPT | Performed by: INTERNAL MEDICINE

## 2025-01-07 PROCEDURE — C9399 UNCLASSIFIED DRUGS OR BIOLOG: HCPCS | Performed by: THORACIC SURGERY (CARDIOTHORACIC VASCULAR SURGERY)

## 2025-01-07 PROCEDURE — 37000009 HC ANESTHESIA EA ADD 15 MINS: Performed by: THORACIC SURGERY (CARDIOTHORACIC VASCULAR SURGERY)

## 2025-01-07 PROCEDURE — 80048 BASIC METABOLIC PNL TOTAL CA: CPT | Mod: XB | Performed by: INTERNAL MEDICINE

## 2025-01-07 PROCEDURE — 63600175 PHARM REV CODE 636 W HCPCS: Performed by: NURSE ANESTHETIST, CERTIFIED REGISTERED

## 2025-01-07 PROCEDURE — 25000003 PHARM REV CODE 250

## 2025-01-07 PROCEDURE — 85025 COMPLETE CBC W/AUTO DIFF WBC: CPT | Mod: 91 | Performed by: THORACIC SURGERY (CARDIOTHORACIC VASCULAR SURGERY)

## 2025-01-07 PROCEDURE — 33533 CABG ARTERIAL SINGLE: CPT | Mod: ,,, | Performed by: THORACIC SURGERY (CARDIOTHORACIC VASCULAR SURGERY)

## 2025-01-07 DEVICE — BONE HEMOSTASIS MATERIAL - 2.5G
Type: IMPLANTABLE DEVICE | Site: CHEST | Status: FUNCTIONAL
Brand: OSTENE

## 2025-01-07 RX ORDER — MIDAZOLAM HYDROCHLORIDE 1 MG/ML
1 INJECTION, SOLUTION INTRAMUSCULAR; INTRAVENOUS
Status: ACTIVE | OUTPATIENT
Start: 2025-01-07 | End: 2025-01-08

## 2025-01-07 RX ORDER — ASCORBIC ACID 500 MG
500 TABLET ORAL 2 TIMES DAILY
Status: DISCONTINUED | OUTPATIENT
Start: 2025-01-07 | End: 2025-01-09

## 2025-01-07 RX ORDER — ROCURONIUM BROMIDE 10 MG/ML
INJECTION, SOLUTION INTRAVENOUS
Status: DISCONTINUED | OUTPATIENT
Start: 2025-01-07 | End: 2025-01-07

## 2025-01-07 RX ORDER — PAPAVERINE HYDROCHLORIDE 30 MG/ML
INJECTION INTRAMUSCULAR; INTRAVENOUS
Status: DISCONTINUED | OUTPATIENT
Start: 2025-01-07 | End: 2025-01-07 | Stop reason: HOSPADM

## 2025-01-07 RX ORDER — SODIUM CHLORIDE, SODIUM LACTATE, POTASSIUM CHLORIDE, CALCIUM CHLORIDE 600; 310; 30; 20 MG/100ML; MG/100ML; MG/100ML; MG/100ML
1000 INJECTION, SOLUTION INTRAVENOUS
Status: DISCONTINUED | OUTPATIENT
Start: 2025-01-07 | End: 2025-01-11 | Stop reason: HOSPADM

## 2025-01-07 RX ORDER — PROPOFOL 10 MG/ML
VIAL (ML) INTRAVENOUS
Status: DISCONTINUED | OUTPATIENT
Start: 2025-01-07 | End: 2025-01-07

## 2025-01-07 RX ORDER — ALBUMIN HUMAN 50 G/1000ML
25 SOLUTION INTRAVENOUS
Status: DISCONTINUED | OUTPATIENT
Start: 2025-01-07 | End: 2025-01-11 | Stop reason: HOSPADM

## 2025-01-07 RX ORDER — ONDANSETRON HYDROCHLORIDE 2 MG/ML
4 INJECTION, SOLUTION INTRAVENOUS EVERY 12 HOURS PRN
Status: DISCONTINUED | OUTPATIENT
Start: 2025-01-07 | End: 2025-01-11 | Stop reason: HOSPADM

## 2025-01-07 RX ORDER — HEPARIN SODIUM 1000 [USP'U]/ML
INJECTION, SOLUTION INTRAVENOUS; SUBCUTANEOUS
Status: DISCONTINUED | OUTPATIENT
Start: 2025-01-07 | End: 2025-01-07 | Stop reason: HOSPADM

## 2025-01-07 RX ORDER — CEFAZOLIN SODIUM 1 G/3ML
INJECTION, POWDER, FOR SOLUTION INTRAMUSCULAR; INTRAVENOUS
Status: DISCONTINUED | OUTPATIENT
Start: 2025-01-07 | End: 2025-01-07

## 2025-01-07 RX ORDER — FENTANYL CITRATE 50 UG/ML
INJECTION, SOLUTION INTRAMUSCULAR; INTRAVENOUS
Status: DISCONTINUED | OUTPATIENT
Start: 2025-01-07 | End: 2025-01-07

## 2025-01-07 RX ORDER — LIDOCAINE HYDROCHLORIDE ANHYDROUS AND DEXTROSE MONOHYDRATE .8; 5 G/100ML; G/100ML
INJECTION, SOLUTION INTRAVENOUS CONTINUOUS PRN
Status: DISCONTINUED | OUTPATIENT
Start: 2025-01-07 | End: 2025-01-07

## 2025-01-07 RX ORDER — POLYETHYLENE GLYCOL 3350 17 G/17G
17 POWDER, FOR SOLUTION ORAL DAILY
Status: DISCONTINUED | OUTPATIENT
Start: 2025-01-08 | End: 2025-01-11 | Stop reason: HOSPADM

## 2025-01-07 RX ORDER — CALCIUM GLUCONATE 20 MG/ML
3 INJECTION, SOLUTION INTRAVENOUS
Status: DISCONTINUED | OUTPATIENT
Start: 2025-01-07 | End: 2025-01-11 | Stop reason: HOSPADM

## 2025-01-07 RX ORDER — DOCUSATE SODIUM 100 MG/1
100 CAPSULE, LIQUID FILLED ORAL 2 TIMES DAILY
Status: DISCONTINUED | OUTPATIENT
Start: 2025-01-07 | End: 2025-01-11 | Stop reason: HOSPADM

## 2025-01-07 RX ORDER — ACETAMINOPHEN 10 MG/ML
INJECTION, SOLUTION INTRAVENOUS
Status: DISCONTINUED | OUTPATIENT
Start: 2025-01-07 | End: 2025-01-07

## 2025-01-07 RX ORDER — KETAMINE HCL IN 0.9 % NACL 50 MG/5 ML
SYRINGE (ML) INTRAVENOUS
Status: DISCONTINUED | OUTPATIENT
Start: 2025-01-07 | End: 2025-01-07

## 2025-01-07 RX ORDER — NITROGLYCERIN 5 MG/ML
INJECTION, SOLUTION INTRAVENOUS
Status: DISCONTINUED | OUTPATIENT
Start: 2025-01-07 | End: 2025-01-07

## 2025-01-07 RX ORDER — OXYCODONE HYDROCHLORIDE 5 MG/1
5 TABLET ORAL EVERY 4 HOURS PRN
Status: DISCONTINUED | OUTPATIENT
Start: 2025-01-07 | End: 2025-01-11 | Stop reason: HOSPADM

## 2025-01-07 RX ORDER — NOREPINEPHRINE BITARTRATE 0.02 MG/ML
0-3 INJECTION, SOLUTION INTRAVENOUS CONTINUOUS
Status: DISCONTINUED | OUTPATIENT
Start: 2025-01-07 | End: 2025-01-09

## 2025-01-07 RX ORDER — DEXTROSE, SODIUM CHLORIDE, SODIUM LACTATE, POTASSIUM CHLORIDE, AND CALCIUM CHLORIDE 5; .6; .31; .03; .02 G/100ML; G/100ML; G/100ML; G/100ML; G/100ML
INJECTION, SOLUTION INTRAVENOUS CONTINUOUS
Status: ACTIVE | OUTPATIENT
Start: 2025-01-07 | End: 2025-01-08

## 2025-01-07 RX ORDER — CEFAZOLIN 2 G/1
2 INJECTION, POWDER, FOR SOLUTION INTRAMUSCULAR; INTRAVENOUS
Status: DISCONTINUED | OUTPATIENT
Start: 2025-01-07 | End: 2025-01-07 | Stop reason: HOSPADM

## 2025-01-07 RX ORDER — FENTANYL CITRATE 50 UG/ML
50 INJECTION, SOLUTION INTRAMUSCULAR; INTRAVENOUS
Status: DISCONTINUED | OUTPATIENT
Start: 2025-01-07 | End: 2025-01-11 | Stop reason: HOSPADM

## 2025-01-07 RX ORDER — BUPIVACAINE 13.3 MG/ML
INJECTION, SUSPENSION, LIPOSOMAL INFILTRATION
Status: DISCONTINUED | OUTPATIENT
Start: 2025-01-07 | End: 2025-01-07 | Stop reason: HOSPADM

## 2025-01-07 RX ORDER — DEXMEDETOMIDINE HYDROCHLORIDE 4 UG/ML
0-1.4 INJECTION INTRAVENOUS CONTINUOUS
Status: DISCONTINUED | OUTPATIENT
Start: 2025-01-07 | End: 2025-01-09

## 2025-01-07 RX ORDER — IPRATROPIUM BROMIDE AND ALBUTEROL SULFATE 2.5; .5 MG/3ML; MG/3ML
3 SOLUTION RESPIRATORY (INHALATION) EVERY 4 HOURS
Status: DISPENSED | OUTPATIENT
Start: 2025-01-07 | End: 2025-01-08

## 2025-01-07 RX ORDER — NOREPINEPHRINE BITARTRATE 0.02 MG/ML
INJECTION, SOLUTION INTRAVENOUS
Status: COMPLETED
Start: 2025-01-07 | End: 2025-01-07

## 2025-01-07 RX ORDER — AMIODARONE HYDROCHLORIDE 200 MG/1
200 TABLET ORAL ONCE
Status: COMPLETED | OUTPATIENT
Start: 2025-01-07 | End: 2025-01-07

## 2025-01-07 RX ORDER — LANOLIN ALCOHOL/MO/W.PET/CERES
1000 CREAM (GRAM) TOPICAL DAILY
Status: DISCONTINUED | OUTPATIENT
Start: 2025-01-09 | End: 2025-01-09

## 2025-01-07 RX ORDER — VANCOMYCIN HYDROCHLORIDE 1 G/20ML
INJECTION, POWDER, LYOPHILIZED, FOR SOLUTION INTRAVENOUS
Status: DISCONTINUED | OUTPATIENT
Start: 2025-01-07 | End: 2025-01-07 | Stop reason: HOSPADM

## 2025-01-07 RX ORDER — BUPIVACAINE HYDROCHLORIDE 2.5 MG/ML
INJECTION, SOLUTION EPIDURAL; INFILTRATION; INTRACAUDAL
Status: DISCONTINUED | OUTPATIENT
Start: 2025-01-07 | End: 2025-01-07 | Stop reason: HOSPADM

## 2025-01-07 RX ORDER — VASOPRESSIN IN DEXTROSE 5 % 25/250 ML
0.01 PLASTIC BAG, INJECTION (ML) INTRAVENOUS CONTINUOUS
Status: DISCONTINUED | OUTPATIENT
Start: 2025-01-07 | End: 2025-01-07

## 2025-01-07 RX ORDER — CLOPIDOGREL BISULFATE 75 MG/1
75 TABLET ORAL DAILY
Status: DISCONTINUED | OUTPATIENT
Start: 2025-01-08 | End: 2025-01-11 | Stop reason: HOSPADM

## 2025-01-07 RX ORDER — CALCIUM GLUCONATE 20 MG/ML
2 INJECTION, SOLUTION INTRAVENOUS
Status: DISCONTINUED | OUTPATIENT
Start: 2025-01-07 | End: 2025-01-11 | Stop reason: HOSPADM

## 2025-01-07 RX ORDER — HEPARIN SODIUM 1000 [USP'U]/ML
INJECTION, SOLUTION INTRAVENOUS; SUBCUTANEOUS
Status: DISCONTINUED | OUTPATIENT
Start: 2025-01-07 | End: 2025-01-07

## 2025-01-07 RX ORDER — FAMOTIDINE 10 MG/ML
20 INJECTION INTRAVENOUS 2 TIMES DAILY
Status: DISCONTINUED | OUTPATIENT
Start: 2025-01-07 | End: 2025-01-07

## 2025-01-07 RX ORDER — NOREPINEPHRINE BITARTRATE 1 MG/ML
INJECTION, SOLUTION INTRAVENOUS
Status: DISCONTINUED | OUTPATIENT
Start: 2025-01-07 | End: 2025-01-07

## 2025-01-07 RX ORDER — INDOMETHACIN 25 MG/1
50 CAPSULE ORAL ONCE
Status: COMPLETED | OUTPATIENT
Start: 2025-01-07 | End: 2025-01-07

## 2025-01-07 RX ORDER — MIDAZOLAM HYDROCHLORIDE 1 MG/ML
INJECTION, SOLUTION INTRAMUSCULAR; INTRAVENOUS
Status: DISCONTINUED | OUTPATIENT
Start: 2025-01-07 | End: 2025-01-07

## 2025-01-07 RX ORDER — ACETAMINOPHEN 325 MG/1
650 TABLET ORAL EVERY 6 HOURS PRN
Status: DISCONTINUED | OUTPATIENT
Start: 2025-01-07 | End: 2025-01-11 | Stop reason: HOSPADM

## 2025-01-07 RX ORDER — HYDROCODONE BITARTRATE AND ACETAMINOPHEN 500; 5 MG/1; MG/1
TABLET ORAL
Status: DISCONTINUED | OUTPATIENT
Start: 2025-01-07 | End: 2025-01-07 | Stop reason: HOSPADM

## 2025-01-07 RX ORDER — OXYCODONE HYDROCHLORIDE 5 MG/1
10 TABLET ORAL EVERY 4 HOURS PRN
Status: DISCONTINUED | OUTPATIENT
Start: 2025-01-07 | End: 2025-01-11 | Stop reason: HOSPADM

## 2025-01-07 RX ORDER — NICARDIPINE HYDROCHLORIDE 0.2 MG/ML
0-15 INJECTION INTRAVENOUS CONTINUOUS PRN
Status: DISCONTINUED | OUTPATIENT
Start: 2025-01-07 | End: 2025-01-11 | Stop reason: HOSPADM

## 2025-01-07 RX ORDER — FENTANYL CITRATE 50 UG/ML
25 INJECTION, SOLUTION INTRAMUSCULAR; INTRAVENOUS
Status: DISCONTINUED | OUTPATIENT
Start: 2025-01-07 | End: 2025-01-11 | Stop reason: HOSPADM

## 2025-01-07 RX ORDER — CALCIUM GLUCONATE 20 MG/ML
1 INJECTION, SOLUTION INTRAVENOUS
Status: DISCONTINUED | OUTPATIENT
Start: 2025-01-07 | End: 2025-01-11 | Stop reason: HOSPADM

## 2025-01-07 RX ORDER — ACETAMINOPHEN 10 MG/ML
1000 INJECTION, SOLUTION INTRAVENOUS EVERY 8 HOURS
Status: COMPLETED | OUTPATIENT
Start: 2025-01-07 | End: 2025-01-08

## 2025-01-07 RX ORDER — CHLORHEXIDINE GLUCONATE ORAL RINSE 1.2 MG/ML
10 SOLUTION DENTAL 2 TIMES DAILY
Status: DISCONTINUED | OUTPATIENT
Start: 2025-01-07 | End: 2025-01-09 | Stop reason: SDUPTHER

## 2025-01-07 RX ORDER — METOPROLOL TARTRATE 25 MG/1
25 TABLET, FILM COATED ORAL
Status: DISCONTINUED | OUTPATIENT
Start: 2025-01-07 | End: 2025-01-07 | Stop reason: HOSPADM

## 2025-01-07 RX ORDER — LIDOCAINE HYDROCHLORIDE 20 MG/ML
INJECTION, SOLUTION EPIDURAL; INFILTRATION; INTRACAUDAL; PERINEURAL
Status: DISCONTINUED | OUTPATIENT
Start: 2025-01-07 | End: 2025-01-07

## 2025-01-07 RX ORDER — CALCIUM CHLORIDE 100 MG/ML
INJECTION, SOLUTION INTRAVENOUS
Status: DISCONTINUED | OUTPATIENT
Start: 2025-01-07 | End: 2025-01-07

## 2025-01-07 RX ORDER — FOLIC ACID 1 MG/1
1 TABLET ORAL DAILY
Status: DISCONTINUED | OUTPATIENT
Start: 2025-01-09 | End: 2025-01-09

## 2025-01-07 RX ORDER — SUCCINYLCHOLINE CHLORIDE 20 MG/ML
INJECTION INTRAMUSCULAR; INTRAVENOUS
Status: DISCONTINUED | OUTPATIENT
Start: 2025-01-07 | End: 2025-01-07

## 2025-01-07 RX ORDER — CEFAZOLIN 2 G/1
2 INJECTION, POWDER, FOR SOLUTION INTRAMUSCULAR; INTRAVENOUS
Status: DISCONTINUED | OUTPATIENT
Start: 2025-01-07 | End: 2025-01-08

## 2025-01-07 RX ORDER — METOCLOPRAMIDE HYDROCHLORIDE 5 MG/ML
5 INJECTION INTRAMUSCULAR; INTRAVENOUS EVERY 6 HOURS PRN
Status: DISCONTINUED | OUTPATIENT
Start: 2025-01-07 | End: 2025-01-11 | Stop reason: HOSPADM

## 2025-01-07 RX ORDER — MAGNESIUM SULFATE HEPTAHYDRATE 40 MG/ML
4 INJECTION, SOLUTION INTRAVENOUS
Status: DISCONTINUED | OUTPATIENT
Start: 2025-01-07 | End: 2025-01-11 | Stop reason: HOSPADM

## 2025-01-07 RX ORDER — CEFAZOLIN SODIUM 1 G/3ML
INJECTION, POWDER, FOR SOLUTION INTRAMUSCULAR; INTRAVENOUS
Status: DISCONTINUED | OUTPATIENT
Start: 2025-01-07 | End: 2025-01-07 | Stop reason: HOSPADM

## 2025-01-07 RX ORDER — MAGNESIUM SULFATE HEPTAHYDRATE 40 MG/ML
INJECTION, SOLUTION INTRAVENOUS
Status: DISCONTINUED | OUTPATIENT
Start: 2025-01-07 | End: 2025-01-07

## 2025-01-07 RX ORDER — CHLORHEXIDINE GLUCONATE ORAL RINSE 1.2 MG/ML
10 SOLUTION DENTAL
Status: DISCONTINUED | OUTPATIENT
Start: 2025-01-07 | End: 2025-01-07 | Stop reason: HOSPADM

## 2025-01-07 RX ORDER — AMINOCAPROIC ACID 250 MG/ML
INJECTION, SOLUTION INTRAVENOUS
Status: DISCONTINUED | OUTPATIENT
Start: 2025-01-07 | End: 2025-01-07

## 2025-01-07 RX ORDER — FAMOTIDINE 10 MG/ML
20 INJECTION INTRAVENOUS DAILY
Status: DISCONTINUED | OUTPATIENT
Start: 2025-01-08 | End: 2025-01-09

## 2025-01-07 RX ORDER — VASOPRESSIN IN DEXTROSE 5 % 25/250 ML
0.04 PLASTIC BAG, INJECTION (ML) INTRAVENOUS CONTINUOUS
Status: DISCONTINUED | OUTPATIENT
Start: 2025-01-07 | End: 2025-01-09

## 2025-01-07 RX ORDER — ADHESIVE BANDAGE
30 BANDAGE TOPICAL DAILY
Status: DISCONTINUED | OUTPATIENT
Start: 2025-01-08 | End: 2025-01-11 | Stop reason: HOSPADM

## 2025-01-07 RX ORDER — METOPROLOL TARTRATE 25 MG/1
25 TABLET, FILM COATED ORAL 2 TIMES DAILY
Status: DISCONTINUED | OUTPATIENT
Start: 2025-01-08 | End: 2025-01-11 | Stop reason: HOSPADM

## 2025-01-07 RX ORDER — ASPIRIN 81 MG/1
81 TABLET ORAL DAILY
Status: DISCONTINUED | OUTPATIENT
Start: 2025-01-08 | End: 2025-01-11 | Stop reason: HOSPADM

## 2025-01-07 RX ADMIN — NOREPINEPHRINE BITARTRATE 8 MCG: 1 INJECTION, SOLUTION, CONCENTRATE INTRAVENOUS at 02:01

## 2025-01-07 RX ADMIN — FAMOTIDINE 20 MG: 10 INJECTION, SOLUTION INTRAVENOUS at 08:01

## 2025-01-07 RX ADMIN — ACETAMINOPHEN 1000 MG: 10 INJECTION, SOLUTION INTRAVENOUS at 02:01

## 2025-01-07 RX ADMIN — DEXMEDETOMIDINE HYDROCHLORIDE 0.3 MCG/KG/HR: 4 INJECTION INTRAVENOUS at 07:01

## 2025-01-07 RX ADMIN — LIDOCAINE HYDROCHLORIDE 100 MG: 20 INJECTION, SOLUTION EPIDURAL; INFILTRATION; INTRACAUDAL; PERINEURAL at 01:01

## 2025-01-07 RX ADMIN — HEPARIN SODIUM 40000 UNITS: 1000 INJECTION, SOLUTION INTRAVENOUS; SUBCUTANEOUS at 03:01

## 2025-01-07 RX ADMIN — ALBUMIN (HUMAN) 25 G: 2.5 SOLUTION INTRAVENOUS at 07:01

## 2025-01-07 RX ADMIN — ALBUMIN (HUMAN) 25 G: 2.5 SOLUTION INTRAVENOUS at 08:01

## 2025-01-07 RX ADMIN — SODIUM CHLORIDE, SODIUM LACTATE, POTASSIUM CHLORIDE, AND CALCIUM CHLORIDE: .6; .31; .03; .02 INJECTION, SOLUTION INTRAVENOUS at 01:01

## 2025-01-07 RX ADMIN — METHIMAZOLE 10 MG: 10 TABLET ORAL at 07:01

## 2025-01-07 RX ADMIN — NOREPINEPHRINE BITARTRATE 8 MCG: 1 INJECTION, SOLUTION, CONCENTRATE INTRAVENOUS at 03:01

## 2025-01-07 RX ADMIN — CEFAZOLIN 2 G: 330 INJECTION, POWDER, FOR SOLUTION INTRAMUSCULAR; INTRAVENOUS at 02:01

## 2025-01-07 RX ADMIN — ROCURONIUM BROMIDE 50 MG: 10 INJECTION, SOLUTION INTRAVENOUS at 02:01

## 2025-01-07 RX ADMIN — MIDAZOLAM HYDROCHLORIDE 2 MG: 1 INJECTION, SOLUTION INTRAMUSCULAR; INTRAVENOUS at 01:01

## 2025-01-07 RX ADMIN — FENTANYL CITRATE 100 MCG: 50 INJECTION, SOLUTION INTRAMUSCULAR; INTRAVENOUS at 02:01

## 2025-01-07 RX ADMIN — HYDRALAZINE HYDROCHLORIDE 25 MG: 25 TABLET ORAL at 07:01

## 2025-01-07 RX ADMIN — ROCURONIUM BROMIDE 20 MG: 10 INJECTION, SOLUTION INTRAVENOUS at 05:01

## 2025-01-07 RX ADMIN — LIDOCAINE HYDROCHLORIDE 2 MG/MIN: 8 INJECTION, SOLUTION INTRAVENOUS at 01:01

## 2025-01-07 RX ADMIN — SODIUM BICARBONATE 50 MEQ: 84 INJECTION, SOLUTION INTRAVENOUS at 08:01

## 2025-01-07 RX ADMIN — ACETAMINOPHEN 1000 MG: 10 INJECTION INTRAVENOUS at 10:01

## 2025-01-07 RX ADMIN — NITROGLYCERIN 20 MCG: 5 INJECTION, SOLUTION INTRAVENOUS at 02:01

## 2025-01-07 RX ADMIN — ASPIRIN 81 MG: 81 TABLET, COATED ORAL at 07:01

## 2025-01-07 RX ADMIN — PROPOFOL 50 MG: 10 INJECTION, EMULSION INTRAVENOUS at 02:01

## 2025-01-07 RX ADMIN — NITROGLYCERIN 20 MCG: 5 INJECTION, SOLUTION INTRAVENOUS at 03:01

## 2025-01-07 RX ADMIN — NOREPINEPHRINE BITARTRATE 0.06 MCG/KG/MIN: 1 INJECTION, SOLUTION, CONCENTRATE INTRAVENOUS at 05:01

## 2025-01-07 RX ADMIN — DEXMEDETOMIDINE HYDROCHLORIDE 1 MCG/KG/HR: 4 INJECTION INTRAVENOUS at 11:01

## 2025-01-07 RX ADMIN — ISOSORBIDE MONONITRATE 30 MG: 30 TABLET, EXTENDED RELEASE ORAL at 07:01

## 2025-01-07 RX ADMIN — FENTANYL CITRATE 50 MCG: 50 INJECTION, SOLUTION INTRAMUSCULAR; INTRAVENOUS at 01:01

## 2025-01-07 RX ADMIN — SUCCINYLCHOLINE CHLORIDE 140 MG: 20 INJECTION, SOLUTION INTRAMUSCULAR; INTRAVENOUS; PARENTERAL at 01:01

## 2025-01-07 RX ADMIN — LOSARTAN POTASSIUM 100 MG: 50 TABLET, FILM COATED ORAL at 07:01

## 2025-01-07 RX ADMIN — PROPOFOL 150 MG: 10 INJECTION, EMULSION INTRAVENOUS at 01:01

## 2025-01-07 RX ADMIN — NOREPINEPHRINE BITARTRATE 0.1 MCG/KG/MIN: 0.02 INJECTION, SOLUTION INTRAVENOUS at 07:01

## 2025-01-07 RX ADMIN — IPRATROPIUM BROMIDE AND ALBUTEROL SULFATE 3 ML: 2.5; .5 SOLUTION RESPIRATORY (INHALATION) at 08:01

## 2025-01-07 RX ADMIN — MAGNESIUM SULFATE HEPTAHYDRATE 2 G: 2 INJECTION, SOLUTION INTRAVENOUS at 01:01

## 2025-01-07 RX ADMIN — FENTANYL CITRATE 25 MCG: 50 INJECTION INTRAMUSCULAR; INTRAVENOUS at 09:01

## 2025-01-07 RX ADMIN — ROCURONIUM BROMIDE 5 MG: 10 INJECTION, SOLUTION INTRAVENOUS at 01:01

## 2025-01-07 RX ADMIN — METOPROLOL SUCCINATE 50 MG: 50 TABLET, EXTENDED RELEASE ORAL at 07:01

## 2025-01-07 RX ADMIN — ROCURONIUM BROMIDE 95 MG: 10 INJECTION, SOLUTION INTRAVENOUS at 01:01

## 2025-01-07 RX ADMIN — DEXMEDETOMIDINE 0.2 MCG/KG/HR: 200 INJECTION, SOLUTION INTRAVENOUS at 04:01

## 2025-01-07 RX ADMIN — SODIUM CHLORIDE, SODIUM LACTATE, POTASSIUM CHLORIDE, CALCIUM CHLORIDE AND DEXTROSE MONOHYDRATE: 5; 600; 310; 30; 20 INJECTION, SOLUTION INTRAVENOUS at 07:01

## 2025-01-07 RX ADMIN — SODIUM CHLORIDE 1 UNITS/HR: 9 INJECTION, SOLUTION INTRAVENOUS at 08:01

## 2025-01-07 RX ADMIN — VANCOMYCIN HYDROCHLORIDE 1200 MG: 1 INJECTION, POWDER, LYOPHILIZED, FOR SOLUTION INTRAVENOUS at 01:01

## 2025-01-07 RX ADMIN — Medication 25 MG: at 05:01

## 2025-01-07 RX ADMIN — AMINOCAPROIC ACID 5 G: 250 INJECTION, SOLUTION INTRAVENOUS at 06:01

## 2025-01-07 RX ADMIN — EPINEPHRINE 0.04 MCG/KG/MIN: 1 INJECTION INTRAMUSCULAR; INTRAVENOUS; SUBCUTANEOUS at 08:01

## 2025-01-07 RX ADMIN — ROCURONIUM BROMIDE 50 MG: 10 INJECTION, SOLUTION INTRAVENOUS at 03:01

## 2025-01-07 RX ADMIN — NITROGLYCERIN 10 MCG: 5 INJECTION, SOLUTION INTRAVENOUS at 02:01

## 2025-01-07 RX ADMIN — EPINEPHRINE 0.02 MCG/KG/MIN: 1 INJECTION INTRAMUSCULAR; INTRAVENOUS; SUBCUTANEOUS at 08:01

## 2025-01-07 RX ADMIN — CHLORHEXIDINE GLUCONATE 0.12% ORAL RINSE 10 ML: 1.2 LIQUID ORAL at 08:01

## 2025-01-07 RX ADMIN — CALCIUM CHLORIDE 0.5 G: 100 INJECTION, SOLUTION INTRAVENOUS at 05:01

## 2025-01-07 RX ADMIN — ROCURONIUM BROMIDE 30 MG: 10 INJECTION, SOLUTION INTRAVENOUS at 05:01

## 2025-01-07 RX ADMIN — AMIODARONE HYDROCHLORIDE 200 MG: 200 TABLET ORAL at 07:01

## 2025-01-07 RX ADMIN — AMINOCAPROIC ACID 5 G: 250 INJECTION, SOLUTION INTRAVENOUS at 02:01

## 2025-01-07 RX ADMIN — NIFEDIPINE 60 MG: 60 TABLET, FILM COATED, EXTENDED RELEASE ORAL at 07:01

## 2025-01-07 NOTE — PLAN OF CARE
Patient stable, AAOx4, ready for transfer to TELE bed 239. Left wrist with dressing in place, TR Band removed, immobilizer in place. Patient verbalized understanding of limb restrictions. Report given to Jose.     Order for NS @ 100 for 8 hours will be completed at 0100. Dr Guardado spoke with patient at  regarding need for open heart surgery due to significant blockage. Patient is considering CABG but uncertain. Will be NPO after midnight in case he decides to have heart surgery

## 2025-01-07 NOTE — PROGRESS NOTES
[facility] - [facility] ([facility])  Cardiology  Progress Note    Patient Name: Leonidas Bautista  MRN: [id number]  Admission Date: 1/6/2025  Hospital Length of Stay: 0 days  Code Status: Full Code   Attending Physician: Erika Gomes MD   Primary Care Physician: No primary care provider on file.  Expected Discharge Date:   Principal Problem:Abnormal nuclear stress test    Subjective:   HPI:  Patient is a 74 y.o. male presents with H/O CAD HTN HLP TOBACCO USE PALPITATION NSVT BY HOLTER HAD AN ABNORMAL CARDIOLITE .WITH SIGNIFICANT DEFECT IN LAD DISTRIBUTION. HE WAS REFERED FOR CATH DY DR GOOD.     Hospital Course:   1/7/25-Patient seen and examined today, sitting up in bed, s/p LHC yesterday which showed multivessel CAD. CABG planned today. CP free during exam. Admits to being anxious. Labs stable.        Review of Systems   Constitutional: Positive for malaise/fatigue.   HENT: Negative.     Eyes: Negative.    Cardiovascular: Negative.    Respiratory: Negative.     Endocrine: Negative.    Hematologic/Lymphatic: Negative.    Skin: Negative.    Musculoskeletal: Negative.    Gastrointestinal: Negative.    Genitourinary: Negative.    Neurological: Negative.    Psychiatric/Behavioral:  The patient is nervous/anxious.    Allergic/Immunologic: Negative.      Objective:     Vital Signs (Most Recent):  Temp: 98.2 °F (36.8 °C) (01/07/25 1155)  Pulse: 70 (01/07/25 1155)  Resp: 16 (01/07/25 1155)  BP: (!) 180/93 (01/07/25 1155)  SpO2: 97 % (01/07/25 1155) Vital Signs (24h Range):  Temp:  [97.4 °F (36.3 °C)-98.7 °F (37.1 °C)] 98.2 °F (36.8 °C)  Pulse:  [52-82] 70  Resp:  [16-20] 16  SpO2:  [92 %-98 %] 97 %  BP: (143-200)/() 180/93     Weight: 81.6 kg (180 lb)  Body mass index is 25.83 kg/m².     SpO2: 97 %         Intake/Output Summary (Last 24 hours) at 1/7/2025 1300  Last data filed at 1/6/2025 2000  Gross per 24 hour   Intake --   Output 425 ml   Net -425 ml       Lines/Drains/Airways       Peripheral Intravenous Line  Duration  "                 Peripheral IV - Single Lumen 01/06/25 0923 20 G Anterior;Right Forearm 1 day                       Physical Exam  Vitals and nursing note reviewed.   Constitutional:       General: He is not in acute distress.     Appearance: Normal appearance. He is well-developed. He is not diaphoretic.   HENT:      Head: Normocephalic and atraumatic.   Eyes:      General:         Right eye: No discharge.         Left eye: No discharge.      Pupils: Pupils are equal, round, and reactive to light.   Cardiovascular:      Rate and Rhythm: Normal rate and regular rhythm.      Heart sounds: Normal heart sounds, S1 normal and S2 normal. No murmur heard.  Pulmonary:      Effort: Pulmonary effort is normal. No respiratory distress.      Breath sounds: Normal breath sounds. No wheezing.   Abdominal:      General: There is no distension.   Musculoskeletal:      Right lower leg: No edema.      Left lower leg: No edema.   Skin:     General: Skin is warm and dry.      Findings: No erythema.      Comments: L radial access site C/D/I; no bleeding erythema or drainage intact pulse   Neurological:      Mental Status: He is alert and oriented to person, place, and time.   Psychiatric:         Mood and Affect: Mood normal.         Behavior: Behavior normal.            Significant Labs: CMP   Recent Labs   Lab 01/06/25  0925 01/07/25  0526 01/07/25  0859    141 140   K 3.7 3.9 3.9    107 106   CO2 22* 24 24   * 94 94   BUN 21 18 18   CREATININE 1.8* 1.6* 1.6*   CALCIUM 10.4 9.7 9.6   PROT  --   --  6.0   ALBUMIN  --   --  3.4*   BILITOT  --   --  0.6   ALKPHOS  --   --  92   AST  --   --  28   ALT  --   --  20   ANIONGAP 12 10 10   , CBC   Recent Labs   Lab 01/07/25  0526 01/07/25  0859   WBC 7.23 7.11   HGB 13.6* 13.3*   HCT 41.5 41.0    188   , Troponin No results for input(s): "TROPONINIHS" in the last 48 hours., and All pertinent lab results from the last 24 hours have been reviewed.    Significant " Imaging: Echocardiogram: Transthoracic echo (TTE) complete (Cupid Only): No results found for this or any previous visit., EKG: Reviewed, and X-Ray: CXR: X-Ray Chest 1 View (CXR): No results found for this visit on 01/06/25. and X-Ray Chest PA and Lateral (CXR): No results found for this visit on 01/06/25.  Assessment and Plan:   Patient who presents with abnormal MPI stress test/multivessel CAD. Stable this AM. Awaiting CABG.    * Abnormal nuclear stress test  -s/p C which showed multivessel CAD  -CABG planned today  -Continue ASA, Imdur, ARB, Toprol, CCB    Primary hypertension  -Continue home meds  -Titrate medications    Coronary artery disease involving native coronary artery of native heart without angina pectoris  -See plan under abnormal stress test        VTE Risk Mitigation (From admission, onward)           Ordered     Place sequential compression device  Until discontinued         01/07/25 0736     IP VTE HIGH RISK PATIENT  Once         01/07/25 0736                    Esmer Haddad PA-C  Cardiology  [facility] - Surgery ([facility])

## 2025-01-07 NOTE — ANESTHESIA PROCEDURE NOTES
Central Line    Diagnosis: cad  Patient location during procedure: done in OR  Procedure Urgency: Routine  Procedure start time: 1/7/2025 1:51 PM  Timeout: 1/7/2025 1:51 PM  Procedure end time: 1/7/2025 1:55 PM      Staffing  Authorizing Provider: Kristy Rodas MD  Performing Provider: Kristy Rodas MD    Staffing  Performed by: Kristy Rodas MD  Authorized by: Kristy Rodas MD    Anesthesiologist was present at the time of the procedure.  Preanesthetic Checklist  Completed: patient identified, IV checked, site marked, risks and benefits discussed, surgical consent, monitors and equipment checked, pre-op evaluation, timeout performed and anesthesia consent given  Indication   Indication: hemodynamic monitoring, vascular access, med administration     Anesthesia   general anesthesia    Central Line   Skin Prep: skin prepped with ChloraPrep, skin prep agent completely dried prior to procedure  Sterile Barriers Followed: Yes    All five maximal barriers used- gloves, gown, cap, mask, and large sterile sheet    hand hygiene performed prior to central venous catheter insertion  Location: right internal jugular.   Catheter type: triple lumen  Catheter Size: 7 Fr  Ultrasound: vascular probe with ultrasound   Vessel Caliber: medium, patent, compressibility normal  Needle advanced into vessel with real time Ultrasound guidance.  Guidewire confirmed in vessel.  Image recorded and saved.  sterile gel and probe cover used in ultrasound-guided central venous catheter insertion   Manometry: Venous cannualation confirmed by visual estimation of blood vessel pressure using manometry.  Insertion Attempts: 1   Securement:chlorhexidine patch, sterile dressing applied, blood return through all ports and line sutured    Post-Procedure    Adverse Events:none      Guidewire Guidewire removed intact. Guidewire removed intact, verified with nurse.

## 2025-01-07 NOTE — PLAN OF CARE
[facility] - Telemetry (Hospital)  Initial Discharge Assessment       Primary Care Provider: No primary care provider on file.    Admission Diagnosis: Abnormal nuclear stress test [R94.39]  Abnormal EKG [R94.31]  Coronary artery disease involving native coronary artery of native heart with unstable angina pectoris [I25.110]  S/P PTCA (percutaneous transluminal coronary angioplasty) [Z98.61]  S/P coronary artery stent placement [Z95.5]  PSVT (paroxysmal supraventricular tachycardia) [I47.10]  Abnormal Holter monitor finding [R94.31]  CAD, multiple vessel [I25.10]  Hypertension, unspecified type [I10]    Admission Date: 1/6/2025  Expected Discharge Date:     Transition of Care Barriers: No family/friends to help    Payor: [employer] MANAGED MEDICARE / Plan: [employer] MEDICARE HMO / Product Type: Capitation /     Extended Emergency Contact Information  Primary Emergency Contact: KWABENA CHOWDHURY  Mobile Phone: [phone]  Relation: Brother  Preferred language: English   needed? No    Discharge Plan A: Home, Home Health  Discharge Plan B: Skilled Nursing Facility    No Pharmacies Listed    Initial Assessment (most recent)       Adult Discharge Assessment - 01/07/25 1042          Discharge Assessment    Assessment Type Discharge Planning Assessment     Confirmed/corrected address, phone number and insurance Yes     Confirmed Demographics Correct on Facesheet     Source of Information patient     Communicated LETICIA with patient/caregiver Date not available/Unable to determine     Reason For Admission Abnormal stress test     People in Home alone     Facility Arrived From: Home     Do you expect to return to your current living situation? Yes     Do you have help at home or someone to help you manage your care at home? No     Prior to hospitilization cognitive status: Alert/Oriented     Current cognitive status: Alert/Oriented     Walking or Climbing Stairs Difficulty no     Dressing/Bathing Difficulty no     Equipment  Currently Used at Home none     Readmission within 30 days? No     Patient currently being followed by outpatient case management? No     Do you currently have service(s) that help you manage your care at home? No     Do you take prescription medications? Yes     Do you have prescription coverage? Yes     Do you have any problems affording any of your prescribed medications? No     Is the patient taking medications as prescribed? yes     Who is going to help you get home at discharge? TBD by hospital course     How do you get to doctors appointments? car, drives self     Are you on dialysis? No     Do you take coumadin? No     Discharge Plan A Home;Home Health     Discharge Plan B Skilled Nursing Facility     DME Needed Upon Discharge  none     Discharge Plan discussed with: Patient     Transition of Care Barriers No family/friends to help                   Anticipated DC dispo: TBD pending hospital course  Prior Level of Function: Lives at home alone, independent with ADLs, no DME and drives   PCP:      Comments: Pt reports he's expected to have open heart surgery with Dr Guardado. SW discussed typical discharge plan following procedure. Pt reports having no support/help at home. SW advised PT/OT will evaluate and CM will assist with disposition recommendations.

## 2025-01-07 NOTE — ANESTHESIA PREPROCEDURE EVALUATION
01/07/2025  Leonidas Bautista is a 74 y.o., male.      Pre-op Assessment    I have reviewed the Patient Summary Reports.     I have reviewed the Nursing Notes. I have reviewed the NPO Status.      Review of Systems  Anesthesia Hx:  No problems with previous Anesthesia                Hematology/Oncology:  Hematology Normal   Oncology Normal                                   Cardiovascular:     Hypertension   CAD                                          Renal/:  Renal/ Normal                 Hepatic/GI:  Hepatic/GI Normal                    Neurological:  Neurology Normal                                      Endocrine:  Endocrine Normal            Dermatological:  Skin Normal    Psych:  Psychiatric Normal                    Physical Exam  General: Well nourished, Cooperative, Alert and Oriented    Airway:  Mallampati: II / II  Mouth Opening: Normal  TM Distance: Normal  Tongue: Normal  Neck ROM: Normal ROM    Dental:  Intact      Patient Active Problem List   Diagnosis    Abnormal nuclear stress test    Coronary artery disease involving native coronary artery of native heart without angina pectoris    Primary hypertension    Other hyperlipidemia    Bilateral carotid artery stenosis    Presence of drug coated stent in LAD coronary artery     ECHO: LVEF 35%      Anesthesia Plan  Type of Anesthesia, risks & benefits discussed:    Anesthesia Type: Gen ETT  Intra-op Monitoring Plan: Standard ASA Monitors, Art Line, Central Line and [place]  Post Op Pain Control Plan: multimodal analgesia  Induction:  IV  Airway Plan: Direct  Informed Consent: Informed consent signed with the Patient and all parties understand the risks and agree with anesthesia plan.  All questions answered.   ASA Score: 4  Day of Surgery Review of History & Physical: H&P Update referred to the surgeon/provider.I have interviewed and examined the  patient. I have reviewed the patient's H&P dated: There are no significant changes. H&P completed by Anesthesiologist.    Ready For Surgery From Anesthesia Perspective.     .

## 2025-01-07 NOTE — ANESTHESIA PROCEDURE NOTES
Intubation    Date/Time: 1/7/2025 1:50 PM    Performed by: Robert Polanco CRNA  Authorized by: Kristy Rodas MD    Intubation:     Induction:  Intravenous    Mask Ventilation:  Easy mask    Attempts:  1    Attempted By:  CRNA and student    Method of Intubation:  Video laryngoscopy    Blade:  Florez 3    Laryngeal View Grade: Grade IIA - cords partially seen      Difficult Airway Encountered?: No      Complications:  None    Airway Device:  Oral endotracheal tube    Airway Device Size:  8.0    Style/Cuff Inflation:  Cuffed (inflated to minimal occlusive pressure)    Tube secured:  22    Secured at:  The lips    Placement Verified By:  Capnometry    Complicating Factors:  None    Findings Post-Intubation:  BS equal bilateral

## 2025-01-07 NOTE — CONSULTS
[facility]")  Cardiothoracic Surgery  Consult Note    Patient Name: Leonidas Bautista  MRN: [id number]  Admission Date: 1/6/2025  Attending Physician: Erika Gomes MD  Referring Provider: Erika Gomes MD    Patient information was obtained from patient and primary team.     Inpatient consult to Cardiothoracic Surgery  Consult performed by: Sidra Guardado MD  Consult ordered by: Erika Gomes MD        Subjective:     Chief Complaint/Reason for Admission:  Exertional angina    History of Present Illness:  74-year-old male with a history of smoking hypertension hyperlipidemia and family history of coronary artery disease had a positive stress test for his exertional angina patient had a left heart catheterization showed multivessel coronary artery disease the patient had a PCI of his LAD in 2003.  He has been admitted for management of his multivessel coronary artery disease.    No current facility-administered medications on file prior to encounter.     No current outpatient medications on file prior to encounter.       Review of patient's allergies indicates:  No Known Allergies    Past Medical History:   Diagnosis Date    Abnormal nuclear stress test 01/05/2025    Bilateral carotid artery stenosis 01/05/2025    CAD S/P percutaneous coronary angioplasty 2003    Stent mid LAD July 15, 2003    Carotid artery disease     Coronary artery disease involving native coronary artery of native heart without angina pectoris 01/05/2025    Diastolic dysfunction     Essential (primary) hypertension     Resistent    Graves disease     hyperthroidism    Hyperlipidemia     Hypertensive heart disease     Other hyperlipidemia 01/05/2025    Presence of drug coated stent in LAD coronary artery 01/05/2025    Primary hypertension 01/05/2025     Past Surgical History:   Procedure Laterality Date    ptca with Stent mid LAD in 2003 N/A      Family History    None       Tobacco Use    Smoking status: Not on file     Smokeless tobacco: Not on file   Substance and Sexual Activity    Alcohol use: Not on file    Drug use: Not on file    Sexual activity: Not on file     Review of Systems   Constitutional:  Negative for activity change and appetite change.   HENT:  Negative for dental problem, nosebleeds and sore throat.    Eyes:  Negative for discharge and visual disturbance.   Respiratory:  Negative for cough, chest tightness and stridor.    Cardiovascular:  Positive for chest pain. Negative for leg swelling.   Gastrointestinal:  Negative for abdominal distention and abdominal pain.   Genitourinary:  Negative for difficulty urinating and dysuria.   Musculoskeletal:  Negative for arthralgias, back pain and joint swelling.   Allergic/Immunologic: Negative for environmental allergies.   Neurological:  Negative for dizziness, syncope and headaches.   Hematological:  Does not bruise/bleed easily.   Psychiatric/Behavioral:  Negative for behavioral problems.      Objective:     Vital Signs (Most Recent):  Temp: 98 °F (36.7 °C) (01/06/25 2000)  Pulse: 69 (01/06/25 2000)  Resp: 20 (01/06/25 2000)  BP: (!) 197/100 (01/06/25 2000)  SpO2: 97 % (01/06/25 2000) Vital Signs (24h Range):  Temp:  [98 °F (36.7 °C)-98.1 °F (36.7 °C)] 98 °F (36.7 °C)  Pulse:  [59-75] 69  Resp:  [17-20] 20  SpO2:  [92 %-98 %] 97 %  BP: (156-200)/() 197/100     Weight: 81.6 kg (180 lb)  Body mass index is 25.83 kg/m².    SpO2: 97 %        Intake/Output - Last 3 Shifts       None             Lines/Drains/Airways       Peripheral Intravenous Line  Duration                  Peripheral IV - Single Lumen 01/06/25 0923 20 G Anterior;Right Forearm <1 day                     STS Risk Score: 2% mortality    Physical Exam  Constitutional:       Appearance: Normal appearance. He is obese.   HENT:      Head: Normocephalic and atraumatic.      Mouth/Throat:      Mouth: Mucous membranes are moist.   Eyes:      Extraocular Movements: Extraocular movements intact.      Pupils:  "Pupils are equal, round, and reactive to light.   Cardiovascular:      Rate and Rhythm: Normal rate and regular rhythm.      Pulses: Normal pulses.      Heart sounds: Normal heart sounds.   Pulmonary:      Effort: Pulmonary effort is normal.      Breath sounds: Normal breath sounds.   Abdominal:      General: Bowel sounds are normal.      Palpations: Abdomen is soft.   Musculoskeletal:         General: Normal range of motion.   Skin:     General: Skin is warm.      Capillary Refill: Capillary refill takes less than 2 seconds.   Neurological:      General: No focal deficit present.      Mental Status: He is alert and oriented to person, place, and time.   Psychiatric:         Mood and Affect: Mood normal.         Behavior: Behavior normal.         Significant Labs:  BMP:   Recent Labs   Lab 01/06/25  0925   *      K 3.7      CO2 22*   BUN 21   CREATININE 1.8*   CALCIUM 10.4     CBC: No results for input(s): "WBC", "RBC", "HGB", "HCT", "PLT", "MCV", "MCH", "MCHC" in the last 48 hours.    Significant Diagnostics:  I reviewed the left heart catheterization which shows disease involving his proximal LAD circumflex diagonal and obtuse marginal the patient has totally occluded right coronary artery were from left-to-right collaterals    Assessment/Plan:   74-year-old male with a history of insertional angina positive stress test left heart catheterization revealed multivessel coronary artery disease the patient had PCI to his LAD in 2003.  I discussed the surgical procedure coronary artery bypass grafting x4 risks benefits and alternatives of the procedure.  The patient at this time is undecided about the surgery we will keep him tentatively posted for 01/07/2025.  NYHA Score: NYHA I: cardiac disease, but without resulting limitations of physical activity    Active Diagnoses:    Diagnosis Date Noted POA    PRINCIPAL PROBLEM:  Abnormal nuclear stress test [R94.39] 01/05/2025 Yes    Coronary artery disease " involving native coronary artery of native heart without angina pectoris [I25.10] 01/05/2025 Yes    Primary hypertension [I10] 01/05/2025 Yes    Other hyperlipidemia [E78.49] 01/05/2025 Yes    Bilateral carotid artery stenosis [I65.23] 01/05/2025 Yes    Presence of drug coated stent in LAD coronary artery [Z95.5] 01/05/2025 Not Applicable      Problems Resolved During this Admission:       Thank you for your consult. I will follow-up with patient. Please contact us if you have any additional questions.    Sidra Guardado MD  Cardiothoracic Surgery  [facility] - Telemetry ([facility])

## 2025-01-07 NOTE — PLAN OF CARE
Problem: Adult Inpatient Plan of Care  Goal: Plan of Care Review  Outcome: Progressing  Goal: Patient-Specific Goal (Individualized)  Outcome: Progressing  Goal: Absence of Hospital-Acquired Illness or Injury  Outcome: Progressing  Goal: Optimal Comfort and Wellbeing  Outcome: Progressing  Goal: Readiness for Transition of Care  Outcome: Progressing   POC reviewed with pt. Pt verbalizes understanding of POC. No questions at this time.  AAOx4. NADN.  NSR on cardiac monitor. CABG scheduled for today.  Pt remains free of falls.  No complaints at this time.  Safety measures in place. Will continue to monitor.  Informed pt to call for assistance before getting up. Pt verbalizes understanding.  Hourly rounding and chart check complete.

## 2025-01-07 NOTE — HOSPITAL COURSE
1/7/25-Patient seen and examined today, sitting up in bed, s/p LHC yesterday which showed multivessel CAD. CABG planned today. CP free during exam. Admits to being anxious. Labs stable.    1/8/25-Patient seen and examined today, s/p CABG x 4,  post-op day # 1. Feels ok. Weak/tired. Reports incisional pain. On low dose Epi. Labs reviewed. Creatinine 2.3. H/H 9.5/29.1.    1/9/25-Patient seen and examined today, s/p CABG x 4 POD # 2. BP dropped earlier today. Feels well at time of exam. Pain improved, chest tubes removed. Labs reviewed. Creatinine 2.2. H/H 8.9/27.0.     1/10/25-Patient seen and examined today, s/p CABG x 4 POD #3. Recovering well. Pain controlled. Still in afib, HR controlled. Labs reviewed.     1/11/25: pt seen this am.  Felt good, wants go go home.  Discussed w CV surgery.  In a fib, rate controlled.  Chart/labs reviewed.

## 2025-01-07 NOTE — ASSESSMENT & PLAN NOTE
-s/p LHC which showed multivessel CAD  -CABG planned today  -Continue ASA, Imdur, ARB, Toprol, CCB

## 2025-01-07 NOTE — ANESTHESIA PROCEDURE NOTES
Arterial    Diagnosis: cad    Patient location during procedure: done in OR  Timeout: 1/7/2025 1:49 PM  Procedure end time: 1/7/2025 1:50 PM    Staffing  Authorizing Provider: Kristy Rodas MD  Performing Provider: Kristy Rodas MD    Staffing  Performed by: Kristy Rodas MD  Authorized by: Kristy Rodas MD    Anesthesiologist was present at the time of the procedure.    Preanesthetic Checklist  Completed: patient identified, IV checked, site marked, risks and benefits discussed, surgical consent, monitors and equipment checked, pre-op evaluation, timeout performed and anesthesia consent givenArterial  Skin Prep: chlorhexidine gluconate  Local Infiltration: lidocaine  Orientation: right  Location: radial    Catheter Size: 20 G  Catheter placement by Anatomical landmarks. Heme positive aspiration all ports. Insertion Attempts: 1  Assessment  Dressing: sutured in place and taped and tegaderm  Patient: Tolerated well

## 2025-01-07 NOTE — SUBJECTIVE & OBJECTIVE
Review of Systems   Constitutional: Positive for malaise/fatigue.   HENT: Negative.     Eyes: Negative.    Cardiovascular: Negative.    Respiratory: Negative.     Endocrine: Negative.    Hematologic/Lymphatic: Negative.    Skin: Negative.    Musculoskeletal: Negative.    Gastrointestinal: Negative.    Genitourinary: Negative.    Neurological: Negative.    Psychiatric/Behavioral:  The patient is nervous/anxious.    Allergic/Immunologic: Negative.      Objective:     Vital Signs (Most Recent):  Temp: 98.2 °F (36.8 °C) (01/07/25 1155)  Pulse: 70 (01/07/25 1155)  Resp: 16 (01/07/25 1155)  BP: (!) 180/93 (01/07/25 1155)  SpO2: 97 % (01/07/25 1155) Vital Signs (24h Range):  Temp:  [97.4 °F (36.3 °C)-98.7 °F (37.1 °C)] 98.2 °F (36.8 °C)  Pulse:  [52-82] 70  Resp:  [16-20] 16  SpO2:  [92 %-98 %] 97 %  BP: (143-200)/() 180/93     Weight: 81.6 kg (180 lb)  Body mass index is 25.83 kg/m².     SpO2: 97 %         Intake/Output Summary (Last 24 hours) at 1/7/2025 1300  Last data filed at 1/6/2025 2000  Gross per 24 hour   Intake --   Output 425 ml   Net -425 ml       Lines/Drains/Airways       Peripheral Intravenous Line  Duration                  Peripheral IV - Single Lumen 01/06/25 0923 20 G Anterior;Right Forearm 1 day                       Physical Exam  Vitals and nursing note reviewed.   Constitutional:       General: He is not in acute distress.     Appearance: Normal appearance. He is well-developed. He is not diaphoretic.   HENT:      Head: Normocephalic and atraumatic.   Eyes:      General:         Right eye: No discharge.         Left eye: No discharge.      Pupils: Pupils are equal, round, and reactive to light.   Cardiovascular:      Rate and Rhythm: Normal rate and regular rhythm.      Heart sounds: Normal heart sounds, S1 normal and S2 normal. No murmur heard.  Pulmonary:      Effort: Pulmonary effort is normal. No respiratory distress.      Breath sounds: Normal breath sounds. No wheezing.   Abdominal:  "     General: There is no distension.   Musculoskeletal:      Right lower leg: No edema.      Left lower leg: No edema.   Skin:     General: Skin is warm and dry.      Findings: No erythema.      Comments: L radial access site C/D/I; no bleeding erythema or drainage intact pulse   Neurological:      Mental Status: He is alert and oriented to person, place, and time.   Psychiatric:         Mood and Affect: Mood normal.         Behavior: Behavior normal.            Significant Labs: CMP   Recent Labs   Lab 01/06/25  0925 01/07/25  0526 01/07/25  0859    141 140   K 3.7 3.9 3.9    107 106   CO2 22* 24 24   * 94 94   BUN 21 18 18   CREATININE 1.8* 1.6* 1.6*   CALCIUM 10.4 9.7 9.6   PROT  --   --  6.0   ALBUMIN  --   --  3.4*   BILITOT  --   --  0.6   ALKPHOS  --   --  92   AST  --   --  28   ALT  --   --  20   ANIONGAP 12 10 10   , CBC   Recent Labs   Lab 01/07/25  0526 01/07/25  0859   WBC 7.23 7.11   HGB 13.6* 13.3*   HCT 41.5 41.0    188   , Troponin No results for input(s): "TROPONINIHS" in the last 48 hours., and All pertinent lab results from the last 24 hours have been reviewed.    Significant Imaging: Echocardiogram: Transthoracic echo (TTE) complete (Cupid Only): No results found for this or any previous visit., EKG: Reviewed, and X-Ray: CXR: X-Ray Chest 1 View (CXR): No results found for this visit on 01/06/25. and X-Ray Chest PA and Lateral (CXR): No results found for this visit on 01/06/25.  "

## 2025-01-07 NOTE — NURSING TRANSFER
Nursing Transfer Note      1/6/2025   11:07 PM    Nurse giving handoff:CRVU nurse  Nurse receiving handoff:Jose    Reason patient is being transferred: CABG surgery    Transfer To: tele 2    Transfer via stretcher    Transfer with normal saline, belongings    Transported by RN    Transfer Vital Signs:  Blood Pressure:197/100  Heart Rate:69  O2:97  Temperature:98  Respirations:20    Telemetry: Box Number [id number], Rate 62, Rhythm Normal sinus PVC, and Telemetry  Michael  Order for Tele Monitor? Yes    Additional Lines: none    Medicines sent: normal saline infusing    Any special needs or follow-up needed: none    Patient belongings transferred with patient: Yes    Chart send with patient: Yes    Notified: family per pt    Patient reassessed at: 2030 1/6/2025 (date, time)  1  Upon arrival to floor: cardiac monitor applied, patient oriented to room, call bell in reach, and bed in lowest position

## 2025-01-08 LAB
ALBUMIN SERPL BCP-MCNC: 3.5 G/DL (ref 3.5–5.2)
ALLENS TEST: ABNORMAL
ALP SERPL-CCNC: 61 U/L (ref 40–150)
ALT SERPL W/O P-5'-P-CCNC: 15 U/L (ref 10–44)
ANION GAP SERPL CALC-SCNC: 14 MMOL/L (ref 8–16)
APTT PPP: 46.7 SEC (ref 21–32)
AST SERPL-CCNC: 32 U/L (ref 10–40)
BILIRUB SERPL-MCNC: 0.5 MG/DL (ref 0.1–1)
BUN SERPL-MCNC: 26 MG/DL (ref 8–23)
CALCIUM SERPL-MCNC: 9.4 MG/DL (ref 8.7–10.5)
CHLORIDE SERPL-SCNC: 108 MMOL/L (ref 95–110)
CO2 SERPL-SCNC: 18 MMOL/L (ref 23–29)
CREAT SERPL-MCNC: 2.3 MG/DL (ref 0.5–1.4)
DELSYS: ABNORMAL
ERYTHROCYTE [DISTWIDTH] IN BLOOD BY AUTOMATED COUNT: 13.9 % (ref 11.5–14.5)
EST. GFR  (NO RACE VARIABLE): 29 ML/MIN/1.73 M^2
GLUCOSE SERPL-MCNC: 280 MG/DL (ref 70–110)
HCO3 UR-SCNC: 20.4 MMOL/L (ref 24–28)
HCT VFR BLD AUTO: 29.1 % (ref 40–54)
HGB BLD-MCNC: 9.5 G/DL (ref 14–18)
INR PPP: 1 (ref 0.8–1.2)
MAGNESIUM SERPL-MCNC: 2.6 MG/DL (ref 1.6–2.6)
MAGNESIUM SERPL-MCNC: 2.8 MG/DL (ref 1.6–2.6)
MCH RBC QN AUTO: 34.3 PG (ref 27–31)
MCHC RBC AUTO-ENTMCNC: 32.6 G/DL (ref 32–36)
MCV RBC AUTO: 105 FL (ref 82–98)
PCO2 BLDA: 39.9 MMHG (ref 35–45)
PH SMN: 7.32 [PH] (ref 7.35–7.45)
PLATELET # BLD AUTO: 150 K/UL (ref 150–450)
PMV BLD AUTO: 10 FL (ref 9.2–12.9)
PO2 BLDA: 90 MMHG (ref 80–100)
POC BE: -6 MMOL/L
POC SATURATED O2: 96 % (ref 95–100)
POCT GLUCOSE: 104 MG/DL (ref 70–110)
POCT GLUCOSE: 128 MG/DL (ref 70–110)
POCT GLUCOSE: 131 MG/DL (ref 70–110)
POCT GLUCOSE: 158 MG/DL (ref 70–110)
POCT GLUCOSE: 178 MG/DL (ref 70–110)
POCT GLUCOSE: 198 MG/DL (ref 70–110)
POCT GLUCOSE: 209 MG/DL (ref 70–110)
POCT GLUCOSE: 212 MG/DL (ref 70–110)
POCT GLUCOSE: 245 MG/DL (ref 70–110)
POCT GLUCOSE: 267 MG/DL (ref 70–110)
POCT GLUCOSE: 272 MG/DL (ref 70–110)
POCT GLUCOSE: 272 MG/DL (ref 70–110)
POCT GLUCOSE: 280 MG/DL (ref 70–110)
POTASSIUM SERPL-SCNC: 3.6 MMOL/L (ref 3.5–5.1)
PROT SERPL-MCNC: 5 G/DL (ref 6–8.4)
PROTHROMBIN TIME: 11.5 SEC (ref 9–12.5)
RBC # BLD AUTO: 2.77 M/UL (ref 4.6–6.2)
SAMPLE: ABNORMAL
SITE: ABNORMAL
SODIUM SERPL-SCNC: 140 MMOL/L (ref 136–145)
T4 FREE SERPL-MCNC: 0.91 NG/DL (ref 0.71–1.51)
TSH SERPL DL<=0.005 MIU/L-ACNC: 1.26 UIU/ML (ref 0.4–4)
WBC # BLD AUTO: 18.34 K/UL (ref 3.9–12.7)

## 2025-01-08 PROCEDURE — 80053 COMPREHEN METABOLIC PANEL: CPT | Performed by: THORACIC SURGERY (CARDIOTHORACIC VASCULAR SURGERY)

## 2025-01-08 PROCEDURE — P9045 ALBUMIN (HUMAN), 5%, 250 ML: HCPCS | Mod: JZ,TB | Performed by: THORACIC SURGERY (CARDIOTHORACIC VASCULAR SURGERY)

## 2025-01-08 PROCEDURE — 85730 THROMBOPLASTIN TIME PARTIAL: CPT | Performed by: THORACIC SURGERY (CARDIOTHORACIC VASCULAR SURGERY)

## 2025-01-08 PROCEDURE — 97530 THERAPEUTIC ACTIVITIES: CPT

## 2025-01-08 PROCEDURE — 94640 AIRWAY INHALATION TREATMENT: CPT | Mod: XB

## 2025-01-08 PROCEDURE — 83735 ASSAY OF MAGNESIUM: CPT | Mod: 91 | Performed by: THORACIC SURGERY (CARDIOTHORACIC VASCULAR SURGERY)

## 2025-01-08 PROCEDURE — 85610 PROTHROMBIN TIME: CPT | Performed by: THORACIC SURGERY (CARDIOTHORACIC VASCULAR SURGERY)

## 2025-01-08 PROCEDURE — 99900026 HC AIRWAY MAINTENANCE (STAT)

## 2025-01-08 PROCEDURE — 20000000 HC ICU ROOM

## 2025-01-08 PROCEDURE — 99233 SBSQ HOSP IP/OBS HIGH 50: CPT | Mod: ,,, | Performed by: INTERNAL MEDICINE

## 2025-01-08 PROCEDURE — 27100171 HC OXYGEN HIGH FLOW UP TO 24 HOURS

## 2025-01-08 PROCEDURE — C9399 UNCLASSIFIED DRUGS OR BIOLOG: HCPCS | Performed by: THORACIC SURGERY (CARDIOTHORACIC VASCULAR SURGERY)

## 2025-01-08 PROCEDURE — 63600175 PHARM REV CODE 636 W HCPCS: Performed by: INTERNAL MEDICINE

## 2025-01-08 PROCEDURE — 97162 PT EVAL MOD COMPLEX 30 MIN: CPT

## 2025-01-08 PROCEDURE — 63600175 PHARM REV CODE 636 W HCPCS: Performed by: THORACIC SURGERY (CARDIOTHORACIC VASCULAR SURGERY)

## 2025-01-08 PROCEDURE — 84439 ASSAY OF FREE THYROXINE: CPT | Performed by: NURSE PRACTITIONER

## 2025-01-08 PROCEDURE — 27000221 HC OXYGEN, UP TO 24 HOURS

## 2025-01-08 PROCEDURE — 85027 COMPLETE CBC AUTOMATED: CPT | Performed by: THORACIC SURGERY (CARDIOTHORACIC VASCULAR SURGERY)

## 2025-01-08 PROCEDURE — 25000003 PHARM REV CODE 250: Performed by: THORACIC SURGERY (CARDIOTHORACIC VASCULAR SURGERY)

## 2025-01-08 PROCEDURE — 97166 OT EVAL MOD COMPLEX 45 MIN: CPT

## 2025-01-08 PROCEDURE — 94761 N-INVAS EAR/PLS OXIMETRY MLT: CPT

## 2025-01-08 PROCEDURE — 84443 ASSAY THYROID STIM HORMONE: CPT | Performed by: NURSE PRACTITIONER

## 2025-01-08 PROCEDURE — 25000003 PHARM REV CODE 250: Performed by: INTERNAL MEDICINE

## 2025-01-08 PROCEDURE — 99900035 HC TECH TIME PER 15 MIN (STAT)

## 2025-01-08 PROCEDURE — 25000242 PHARM REV CODE 250 ALT 637 W/ HCPCS: Performed by: THORACIC SURGERY (CARDIOTHORACIC VASCULAR SURGERY)

## 2025-01-08 PROCEDURE — 27200667 HC PACEMAKER, TEMPORARY MONITORING, PER SHIFT

## 2025-01-08 RX ORDER — INSULIN ASPART 100 [IU]/ML
0-5 INJECTION, SOLUTION INTRAVENOUS; SUBCUTANEOUS
Status: DISCONTINUED | OUTPATIENT
Start: 2025-01-08 | End: 2025-01-11 | Stop reason: HOSPADM

## 2025-01-08 RX ORDER — CEFAZOLIN 2 G/1
2 INJECTION, POWDER, FOR SOLUTION INTRAMUSCULAR; INTRAVENOUS
Status: COMPLETED | OUTPATIENT
Start: 2025-01-08 | End: 2025-01-08

## 2025-01-08 RX ORDER — GLUCAGON 1 MG
1 KIT INJECTION
Status: DISCONTINUED | OUTPATIENT
Start: 2025-01-08 | End: 2025-01-11 | Stop reason: HOSPADM

## 2025-01-08 RX ORDER — IBUPROFEN 200 MG
24 TABLET ORAL
Status: DISCONTINUED | OUTPATIENT
Start: 2025-01-08 | End: 2025-01-11 | Stop reason: HOSPADM

## 2025-01-08 RX ORDER — IBUPROFEN 200 MG
16 TABLET ORAL
Status: DISCONTINUED | OUTPATIENT
Start: 2025-01-08 | End: 2025-01-11 | Stop reason: HOSPADM

## 2025-01-08 RX ADMIN — DOCUSATE SODIUM 100 MG: 100 CAPSULE, LIQUID FILLED ORAL at 09:01

## 2025-01-08 RX ADMIN — CEFAZOLIN 2 G: 2 INJECTION, POWDER, FOR SOLUTION INTRAMUSCULAR; INTRAVENOUS at 12:01

## 2025-01-08 RX ADMIN — OXYCODONE HYDROCHLORIDE 10 MG: 5 TABLET ORAL at 02:01

## 2025-01-08 RX ADMIN — INSULIN ASPART 2 UNITS: 100 INJECTION, SOLUTION INTRAVENOUS; SUBCUTANEOUS at 06:01

## 2025-01-08 RX ADMIN — FENTANYL CITRATE 50 MCG: 50 INJECTION INTRAMUSCULAR; INTRAVENOUS at 01:01

## 2025-01-08 RX ADMIN — EPINEPHRINE 0.11 MCG/KG/MIN: 1 INJECTION INTRAMUSCULAR; INTRAVENOUS; SUBCUTANEOUS at 06:01

## 2025-01-08 RX ADMIN — METOPROLOL TARTRATE 25 MG: 25 TABLET, FILM COATED ORAL at 07:01

## 2025-01-08 RX ADMIN — DEXMEDETOMIDINE HYDROCHLORIDE 1 MCG/KG/HR: 4 INJECTION INTRAVENOUS at 03:01

## 2025-01-08 RX ADMIN — IPRATROPIUM BROMIDE AND ALBUTEROL SULFATE 3 ML: 2.5; .5 SOLUTION RESPIRATORY (INHALATION) at 02:01

## 2025-01-08 RX ADMIN — ACETAMINOPHEN 1000 MG: 10 INJECTION INTRAVENOUS at 06:01

## 2025-01-08 RX ADMIN — VANCOMYCIN HYDROCHLORIDE 1000 MG: 1 INJECTION, POWDER, LYOPHILIZED, FOR SOLUTION INTRAVENOUS at 04:01

## 2025-01-08 RX ADMIN — FAMOTIDINE 20 MG: 10 INJECTION, SOLUTION INTRAVENOUS at 10:01

## 2025-01-08 RX ADMIN — OXYCODONE HYDROCHLORIDE 10 MG: 5 TABLET ORAL at 10:01

## 2025-01-08 RX ADMIN — CHLORHEXIDINE GLUCONATE 0.12% ORAL RINSE 10 ML: 1.2 LIQUID ORAL at 09:01

## 2025-01-08 RX ADMIN — SODIUM CHLORIDE 10 UNITS/HR: 9 INJECTION, SOLUTION INTRAVENOUS at 07:01

## 2025-01-08 RX ADMIN — EPINEPHRINE 0.14 MCG/KG/MIN: 1 INJECTION INTRAMUSCULAR; INTRAVENOUS; SUBCUTANEOUS at 02:01

## 2025-01-08 RX ADMIN — FENTANYL CITRATE 50 MCG: 50 INJECTION INTRAMUSCULAR; INTRAVENOUS at 12:01

## 2025-01-08 RX ADMIN — ALBUMIN (HUMAN) 25 G: 2.5 SOLUTION INTRAVENOUS at 07:01

## 2025-01-08 RX ADMIN — OXYCODONE HYDROCHLORIDE AND ACETAMINOPHEN 500 MG: 500 TABLET ORAL at 09:01

## 2025-01-08 RX ADMIN — NICARDIPINE HYDROCHLORIDE 5 MG/HR: 0.2 INJECTION, SOLUTION INTRAVENOUS at 01:01

## 2025-01-08 RX ADMIN — FENTANYL CITRATE 50 MCG: 50 INJECTION INTRAMUSCULAR; INTRAVENOUS at 08:01

## 2025-01-08 RX ADMIN — IPRATROPIUM BROMIDE AND ALBUTEROL SULFATE 3 ML: 2.5; .5 SOLUTION RESPIRATORY (INHALATION) at 12:01

## 2025-01-08 RX ADMIN — IPRATROPIUM BROMIDE AND ALBUTEROL SULFATE 3 ML: 2.5; .5 SOLUTION RESPIRATORY (INHALATION) at 03:01

## 2025-01-08 RX ADMIN — HYDRALAZINE HYDROCHLORIDE 25 MG: 25 TABLET ORAL at 09:01

## 2025-01-08 RX ADMIN — IPRATROPIUM BROMIDE AND ALBUTEROL SULFATE 3 ML: 2.5; .5 SOLUTION RESPIRATORY (INHALATION) at 07:01

## 2025-01-08 NOTE — TRANSFER OF CARE
"Anesthesia Transfer of Care Note    Patient: Leonidas Bautista    Procedure(s) Performed: Procedure(s) (LRB):  CORONARY ARTERY BYPASS GRAFT (CABG) (N/A)  SURGICAL PROCUREMENT, VEIN, ENDOSCOPIC (Right)  ECHOCARDIOGRAM,TRANSESOPHAGEAL (N/A)  BLOCK, NERVE, INTERCOSTAL, 2 OR MORE (N/A)    Patient location: ICU    Anesthesia Type: general    Transport from OR: Transported from OR intubated on 100% O2  with adequate ventilation controlled by transport ventilator. Continuous ECG monitoring in transport. Continuous SpO2 monitoring in transport. Continuos invasive BP monitoring in transport. Continuous CVP monitoring in transport    Post pain: adequate analgesia    Post assessment: no apparent anesthetic complications    Post vital signs: stable    Level of consciousness: responds to stimulation    Nausea/Vomiting: no nausea/vomiting    Complications: none    Transfer of care protocol was followed      Last vitals: Visit Vitals  BP (!) 180/93 (BP Location: Right arm, Patient Position: Sitting)   Pulse 80   Temp 36.8 °C (98.2 °F) (Oral)   Resp 16   Ht 5' 10" (1.778 m)   Wt 81.4 kg (179 lb 7.3 oz)   SpO2 100%   BMI 25.75 kg/m²     "

## 2025-01-08 NOTE — PLAN OF CARE
P.T. EVAL COMPLETE.  PT CURRENTLY REQUIRES CHANI FOR SUP>SIT TF, CGA FOR SEATED SCOOT, [facility] FOR BED>CHAIR TF.  P.T. RECOMMENDS LOW INTENSITY THERAPY UPON DISCHARGE WITH 24 HOUR CARE

## 2025-01-08 NOTE — HOSPITAL COURSE
1/6 - LHC revealed 3v CAD with nl lvf; CT surgery consulted  1/7 - underwent CABG x 4. Transferred to ICU post op. Remains sedated, paralyzed on mech vent via OETT at arrival on lidocaine, levophed, and precedex infusions  1/8 - extubated early morning.  Stable on NC currently.  Remains on Epi gtt.  Alert and conversant.   1/9 - bedside chair.  Chest tubes and pacer out.  Stable on NC.

## 2025-01-08 NOTE — SUBJECTIVE & OBJECTIVE
Past Medical History:   Diagnosis Date    Abnormal nuclear stress test 01/05/2025    Bilateral carotid artery stenosis 01/05/2025    CAD S/P percutaneous coronary angioplasty 2003    Stent mid LAD July 15, 2003    Carotid artery disease     Coronary artery disease involving native coronary artery of native heart without angina pectoris 01/05/2025    Diastolic dysfunction     Essential (primary) hypertension     Resistent    Graves disease     hyperthroidism    Hyperlipidemia     Hypertensive heart disease     Other hyperlipidemia 01/05/2025    Presence of drug coated stent in LAD coronary artery 01/05/2025    Primary hypertension 01/05/2025       Past Surgical History:   Procedure Laterality Date    ANGIOGRAPHY OF INTERNAL MAMMARY VESSEL Left 1/6/2025    Procedure: Angiogram Internal Mammary;  Surgeon: Erika Gomes MD;  Location: [facility] CATH LAB;  Service: Cardiology;  Laterality: Left;    ARTERIOGRAPHY OF SUBCLAVIAN ARTERY Left 1/6/2025    Procedure: ARTERIOGRAM, SUBCLAVIAN;  Surgeon: Erika Gomes MD;  Location: [facility] CATH LAB;  Service: Cardiology;  Laterality: Left;    LEFT HEART CATHETERIZATION Left 1/6/2025    Procedure: Left heart cath;  Surgeon: Erika Gomes MD;  Location: [facility] CATH LAB;  Service: Cardiology;  Laterality: Left;    ptca with Stent mid LAD in 2003 N/A        Review of patient's allergies indicates:  No Known Allergies    Family History    None       Tobacco Use    Smoking status: Every Day     Types: Cigarettes    Smokeless tobacco: Never   Substance and Sexual Activity    Alcohol use: Not on file    Drug use: Not on file    Sexual activity: Not on file         Review of Systems   Unable to perform ROS: Intubated     Objective:     Vital Signs (Most Recent):  Temp: 98.2 °F (36.8 °C) (01/07/25 1155)  Pulse: 80 (01/07/25 1915)  Resp: 16 (01/07/25 1915)  BP: (!) 180/93 (01/07/25 1155)  SpO2: 100 % (01/07/25 1915) Vital Signs (24h Range):  Temp:  [97.4 °F (36.3 °C)-98.7 °F (37.1 °C)] 98.2  °F (36.8 °C)  Pulse:  [52-82] 80  Resp:  [16-20] 16  SpO2:  [95 %-100 %] 100 %  BP: (143-197)/() 180/93     Weight: 81.4 kg (179 lb 7.3 oz)  Body mass index is 25.75 kg/m².      Intake/Output Summary (Last 24 hours) at 1/7/2025 1931  Last data filed at 1/7/2025 1916  Gross per 24 hour   Intake 3350 ml   Output 965 ml   Net 2385 ml      dexmedeTOMIDine (Precedex) infusion (titrating)  0-1.4 mcg/kg/hr Intravenous Continuous 6.12 mL/hr at 01/07/25 1912 0.3 mcg/kg/hr at 01/07/25 1912    dextrose 5% lactated ringers   Intravenous Continuous 25 mL/hr at 01/07/25 1911 New Bag at 01/07/25 1911    EPINEPHrine  0-0.5 mcg/kg/min Intravenous Continuous        EPINEPHrine  0-0.5 mcg/kg/min Intravenous Continuous        insulin regular 1 units/mL infusion orderable (CTS POST-OP)  0-52 Units/hr Intravenous Continuous PRN        nicardipine  0-15 mg/hr Intravenous Continuous PRN        NORepinephrine bitartrate-D5W  0-3 mcg/kg/min Intravenous Continuous 30.6 mL/hr at 01/07/25 1923 0.1 mcg/kg/min at 01/07/25 1923    vasopressin  0.01 Units/min Intravenous Continuous        vasopressin  0.04 Units/min Intravenous Continuous              Physical Exam  Vitals and nursing note reviewed.   Constitutional:       Appearance: He is normal weight. He is ill-appearing.      Interventions: He is sedated, chemically paralyzed and intubated.   HENT:      Head: Atraumatic.   Eyes:      Conjunctiva/sclera: Conjunctivae normal.   Neck:      Vascular: No JVD.   Cardiovascular:      Rate and Rhythm: Normal rate and regular rhythm.      Pulses:           Radial pulses are 1+ on the right side and 1+ on the left side.        Dorsalis pedis pulses are 1+ on the right side and 1+ on the left side.   Pulmonary:      Effort: He is intubated.      Breath sounds: Decreased breath sounds present. No wheezing, rhonchi or rales.   Abdominal:      General: Bowel sounds are absent. There is no distension.      Palpations: Abdomen is soft.   Skin:      General: Skin is cool and dry.      Capillary Refill: Capillary refill takes 2 to 3 seconds.          Vents:  Vent Mode: A/C (01/07/25 1915)  Set Rate: 18 BPM (01/07/25 1915)  Vt Set: 470 mL (01/07/25 1915)  PEEP/CPAP: 5 cmH20 (01/07/25 1915)  Oxygen Concentration (%): 60 (01/07/25 1915)  Peak Airway Pressure: 20 cmH20 (01/07/25 1915)  Plateau Pressure: 0 cmH20 (01/07/25 1915)  Total Ve: 9.06 L/m (01/07/25 1915)  Negative Inspiratory Force (cm H2O): 0 (01/07/25 1915)  F/VT Ratio<105 (RSBI): (!) 32.39 (01/07/25 1915)    Lines/Drains/Airways       Central Venous Catheter Line  Duration             Percutaneous Central Line - Triple Lumen  01/07/25 1351 Internal Jugular Right <1 day              Drain  Duration                  Closed/Suction Drain 01/07/25 1511 Tube - 1 Right Leg Bulb 19 Fr. <1 day         Closed/Suction Drain 01/07/25 1512 Tube - 2 Right Leg Bulb 19 Fr. <1 day         Urethral Catheter 01/07/25 1350 Double-lumen;Silicone;Non-latex;Straight-tip;Temperature probe 16 Fr. <1 day         Y Chest Tube 1 and 2 01/07/25 1641 1 Right Mediastinal 24 Fr. 2 Left Mediastinal 24 Fr. <1 day         Y Chest Tube 3 and 4 01/07/25 1642 3 Left Pleural 24 Fr. Right Pleural 24 Fr. <1 day              Airway  Duration                  Airway - Non-Surgical 01/07/25 1350 <1 day              Arterial Line  Duration             Arterial Line 01/07/25 1349 Right Radial <1 day              Line  Duration                  Pacer Wires 01/07/25 1915 <1 day              Peripheral Intravenous Line  Duration                  Peripheral IV - Single Lumen 01/06/25 0923 20 G Anterior;Right Forearm 1 day                    Significant Labs:    CBC/Anemia Profile:  Recent Labs   Lab 01/07/25  0526 01/07/25  0859 01/07/25 1910 01/07/25 1913   WBC 7.23 7.11 21.94*  --    HGB 13.6* 13.3* 10.5*  --    HCT 41.5 41.0 31.7* 28*    188 147*  --    * 103* 104*  --    RDW 13.6 13.6 13.9  --         Chemistries:  Recent Labs   Lab  01/06/25  0925 01/07/25  0526 01/07/25  0859    141 140   K 3.7 3.9 3.9    107 106   CO2 22* 24 24   BUN 21 18 18   CREATININE 1.8* 1.6* 1.6*   CALCIUM 10.4 9.7 9.6   ALBUMIN  --   --  3.4*   PROT  --   --  6.0   BILITOT  --   --  0.6   ALKPHOS  --   --  92   ALT  --   --  20   AST  --   --  28     ABG  Recent Labs   Lab 01/07/25  1913   PH 7.310*   PO2 188*   PCO2 54.5*   HCO3 27.5   BE 1       All pertinent labs within the past 24 hours have been reviewed.    Significant Imaging:   I have reviewed all pertinent imaging results/findings within the past 24 hours.

## 2025-01-08 NOTE — ASSESSMENT & PLAN NOTE
Patient with known CAD s/p CABG, which is uncontrolled Will continue ASA, Plavix, and Statin and monitor for S/Sx of angina/ACS. Continue to monitor on telemetry.     - now s/p CABG x 4 on 1/7 with Dr Guardado

## 2025-01-08 NOTE — PLAN OF CARE
Pt is AAOx4, VSS on 2L nasal cannula. Epi gtt to maintain MAP > 70. Pt up to chair for meals, tolerating well. Chest tubes to suction with no complications. Pt repositions self independently in bed. POC reviewed with pt, he verbalizes understanding. Bed is locked in lowest position, side rails up x2, bed alarm/chair alarm set, room near nurses' station, call light/personal items within reach, pt instructed to call staff for assistance. All alarms are audible and appropriate.   Temp:  [98.1 °F (36.7 °C)-99 °F (37.2 °C)]   Pulse:  []   Resp:  [18-51]   BP: ()/(48-74)   SpO2:  [91 %-99 %]   Arterial Line BP: ()/(46-73)      Problem: Adult Inpatient Plan of Care  Goal: Plan of Care Review  Outcome: Progressing  Goal: Patient-Specific Goal (Individualized)  Outcome: Progressing  Goal: Absence of Hospital-Acquired Illness or Injury  Outcome: Progressing  Goal: Optimal Comfort and Wellbeing  Outcome: Progressing  Goal: Readiness for Transition of Care  Outcome: Progressing     Problem: Infection  Goal: Absence of Infection Signs and Symptoms  Outcome: Progressing     Problem: Wound  Goal: Optimal Coping  Outcome: Progressing  Goal: Optimal Functional Ability  Outcome: Progressing  Goal: Absence of Infection Signs and Symptoms  Outcome: Progressing  Goal: Improved Oral Intake  Outcome: Progressing  Goal: Optimal Pain Control and Function  Outcome: Progressing  Goal: Skin Health and Integrity  Outcome: Progressing  Goal: Optimal Wound Healing  Outcome: Progressing     Problem: Mechanical Ventilation Invasive  Goal: Effective Communication  Outcome: Progressing  Goal: Optimal Device Function  Outcome: Progressing  Goal: Mechanical Ventilation Liberation  Outcome: Progressing  Goal: Optimal Nutrition Delivery  Outcome: Progressing  Goal: Absence of Device-Related Skin and Tissue Injury  Outcome: Progressing  Goal: Absence of Ventilator-Induced Lung Injury  Outcome: Progressing     Problem: Artificial  Airway  Goal: Effective Communication  Outcome: Progressing  Goal: Optimal Device Function  Outcome: Progressing  Goal: Absence of Device-Related Skin or Tissue Injury  Outcome: Progressing     Problem: Skin Injury Risk Increased  Goal: Skin Health and Integrity  Outcome: Progressing     Problem: Fall Injury Risk  Goal: Absence of Fall and Fall-Related Injury  Outcome: Progressing     Problem: Cardiovascular Surgery  Goal: Improved Activity Tolerance  Outcome: Progressing  Goal: Optimal Coping with Heart Surgery  Outcome: Progressing  Goal: Absence of Bleeding  Outcome: Progressing  Goal: Effective Bowel Elimination  Outcome: Progressing  Goal: Effective Cardiac Function  Outcome: Progressing  Goal: Optimal Cerebral Tissue Perfusion  Outcome: Progressing  Goal: Fluid and Electrolyte Balance  Outcome: Progressing  Goal: Blood Glucose Level Within Targeted Range  Outcome: Progressing  Goal: Absence of Infection Signs and Symptoms  Outcome: Progressing  Goal: Anesthesia/Sedation Recovery  Outcome: Progressing  Goal: Acceptable Pain Control  Outcome: Progressing  Goal: Nausea and Vomiting Relief  Outcome: Progressing  Goal: Effective Urinary Elimination  Outcome: Progressing  Goal: Effective Oxygenation and Ventilation  Outcome: Progressing

## 2025-01-08 NOTE — ASSESSMENT & PLAN NOTE
-s/p LHC which showed multivessel CAD  -CABG planned today  -Continue ASA, Imdur, ARB, Toprol, CCB    See plan under CABG

## 2025-01-08 NOTE — ASSESSMENT & PLAN NOTE
No documented/reported history; based of pre admit labs with no prior available for review  Creatine stable for now.   BMP reviewed- noted Estimated Creatinine Clearance: 29.1 mL/min (A) (based on SCr of 2.3 mg/dL (H)). according to latest data. Based on current GFR, CKD stage is stage 3 - GFR 30-59.   Monitor UOP and serial BMP and adjust therapy as needed.   Renally dose meds. Avoid nephrotoxic medications and procedures.

## 2025-01-08 NOTE — CONSULTS
[facility] - Intensive Care (Hospital)  Critical Care Medicine  Consult Note    Patient Name: Leonidas Bautista  MRN: [id number]  Admission Date: 1/6/2025  Hospital Length of Stay: 0 days  Code Status: Full Code  Attending Physician: Sidra Guardado MD   Primary Care Provider: No primary care provider on file.   Principal Problem: S/P CABG x 4    Inpatient consult to Critical Care Medicine  Consult performed by: Teresa Monreal ACN-BC  Consult ordered by: Sidra Guardado MD        Subjective:     HPI:  74 year old male with known medical issues including HTN; HLD; hyperthyroid; diastolic dysfunction; tobacco smoking; and CAD s/p stent to LAD in 2003.   Pre admit labs show CKD3b (Cr 1.9, GFR 36) no prior labs available.    [place] [facility] [place]/[facility]/25 for LHC after palpitation with NSVT on holter and abnormal cardiolite with significant defect in LAD distribution    Hospital/ICU Course:  1/6 - LHC revealed 3v CAD with nl lvf; CT surgery consulted  1/7 - underwent CABG x 4. Transferred to ICU post op. Remains sedated, paralyzed on mech vent via OETT at arrival on lidocaine, levophed, and precedex infusions    Past Medical History:   Diagnosis Date    Abnormal nuclear stress test 01/05/2025    Bilateral carotid artery stenosis 01/05/2025    CAD S/P percutaneous coronary angioplasty 2003    Stent mid LAD July 15, 2003    Carotid artery disease     Coronary artery disease involving native coronary artery of native heart without angina pectoris 01/05/2025    Diastolic dysfunction     Essential (primary) hypertension     Resistent    Graves disease     hyperthroidism    Hyperlipidemia     Hypertensive heart disease     Other hyperlipidemia 01/05/2025    Presence of drug coated stent in LAD coronary artery 01/05/2025    Primary hypertension 01/05/2025       Past Surgical History:   Procedure Laterality Date    ANGIOGRAPHY OF INTERNAL MAMMARY VESSEL Left 1/6/2025    Procedure: Angiogram Internal Mammary;  Surgeon: Erika Gomes  MD BASILIA;  Location: [facility] CATH LAB;  Service: Cardiology;  Laterality: Left;    ARTERIOGRAPHY OF SUBCLAVIAN ARTERY Left 1/6/2025    Procedure: ARTERIOGRAM, SUBCLAVIAN;  Surgeon: Erika Gomes MD;  Location: [facility] CATH LAB;  Service: Cardiology;  Laterality: Left;    LEFT HEART CATHETERIZATION Left 1/6/2025    Procedure: Left heart cath;  Surgeon: Erika Gomes MD;  Location: [facility] CATH LAB;  Service: Cardiology;  Laterality: Left;    ptca with Stent mid LAD in 2003 N/A        Review of patient's allergies indicates:  No Known Allergies    Family History    None       Tobacco Use    Smoking status: Every Day     Types: Cigarettes    Smokeless tobacco: Never   Substance and Sexual Activity    Alcohol use: Not on file    Drug use: Not on file    Sexual activity: Not on file         Review of Systems   Unable to perform ROS: Intubated     Objective:     Vital Signs (Most Recent):  Temp: 98.2 °F (36.8 °C) (01/07/25 1155)  Pulse: 80 (01/07/25 1915)  Resp: 16 (01/07/25 1915)  BP: (!) 180/93 (01/07/25 1155)  SpO2: 100 % (01/07/25 1915) Vital Signs (24h Range):  Temp:  [97.4 °F (36.3 °C)-98.7 °F (37.1 °C)] 98.2 °F (36.8 °C)  Pulse:  [52-82] 80  Resp:  [16-20] 16  SpO2:  [95 %-100 %] 100 %  BP: (143-197)/() 180/93     Weight: 81.4 kg (179 lb 7.3 oz)  Body mass index is 25.75 kg/m².      Intake/Output Summary (Last 24 hours) at 1/7/2025 1931  Last data filed at 1/7/2025 1916  Gross per 24 hour   Intake 3350 ml   Output 965 ml   Net 2385 ml      dexmedeTOMIDine (Precedex) infusion (titrating)  0-1.4 mcg/kg/hr Intravenous Continuous 6.12 mL/hr at 01/07/25 1912 0.3 mcg/kg/hr at 01/07/25 1912    dextrose 5% lactated ringers   Intravenous Continuous 25 mL/hr at 01/07/25 1911 New Bag at 01/07/25 1911    EPINEPHrine  0-0.5 mcg/kg/min Intravenous Continuous        EPINEPHrine  0-0.5 mcg/kg/min Intravenous Continuous        insulin regular 1 units/mL infusion orderable (CTS POST-OP)  0-52 Units/hr Intravenous Continuous PRN         nicardipine  0-15 mg/hr Intravenous Continuous PRN        NORepinephrine bitartrate-D5W  0-3 mcg/kg/min Intravenous Continuous 30.6 mL/hr at 01/07/25 1923 0.1 mcg/kg/min at 01/07/25 1923    vasopressin  0.01 Units/min Intravenous Continuous        vasopressin  0.04 Units/min Intravenous Continuous              Physical Exam  Vitals and nursing note reviewed.   Constitutional:       Appearance: He is normal weight. He is ill-appearing.      Interventions: He is sedated, chemically paralyzed and intubated.   HENT:      Head: Atraumatic.   Eyes:      Conjunctiva/sclera: Conjunctivae normal.   Neck:      Vascular: No JVD.   Cardiovascular:      Rate and Rhythm: Normal rate and regular rhythm.      Pulses:           Radial pulses are 1+ on the right side and 1+ on the left side.        Dorsalis pedis pulses are 1+ on the right side and 1+ on the left side.   Pulmonary:      Effort: He is intubated.      Breath sounds: Decreased breath sounds present. No wheezing, rhonchi or rales.   Abdominal:      General: Bowel sounds are absent. There is no distension.      Palpations: Abdomen is soft.   Skin:     General: Skin is cool and dry.      Capillary Refill: Capillary refill takes 2 to 3 seconds.          Vents:  Vent Mode: A/C (01/07/25 1915)  Set Rate: 18 BPM (01/07/25 1915)  Vt Set: 470 mL (01/07/25 1915)  PEEP/CPAP: 5 cmH20 (01/07/25 1915)  Oxygen Concentration (%): 60 (01/07/25 1915)  Peak Airway Pressure: 20 cmH20 (01/07/25 1915)  Plateau Pressure: 0 cmH20 (01/07/25 1915)  Total Ve: 9.06 L/m (01/07/25 1915)  Negative Inspiratory Force (cm H2O): 0 (01/07/25 1915)  F/VT Ratio<105 (RSBI): (!) 32.39 (01/07/25 1915)    Lines/Drains/Airways       Central Venous Catheter Line  Duration             Percutaneous Central Line - Triple Lumen  01/07/25 1351 Internal Jugular Right <1 day              Drain  Duration                  Closed/Suction Drain 01/07/25 1511 Tube - 1 Right Leg Bulb 19 Fr. <1 day         Closed/Suction  Drain 01/07/25 1512 Tube - 2 Right Leg Bulb 19 Fr. <1 day         Urethral Catheter 01/07/25 1350 Double-lumen;Silicone;Non-latex;Straight-tip;Temperature probe 16 Fr. <1 day         Y Chest Tube 1 and 2 01/07/25 1641 1 Right Mediastinal 24 Fr. 2 Left Mediastinal 24 Fr. <1 day         Y Chest Tube 3 and 4 01/07/25 1642 3 Left Pleural 24 Fr. Right Pleural 24 Fr. <1 day              Airway  Duration                  Airway - Non-Surgical 01/07/25 1350 <1 day              Arterial Line  Duration             Arterial Line 01/07/25 1349 Right Radial <1 day              Line  Duration                  Pacer Wires 01/07/25 1915 <1 day              Peripheral Intravenous Line  Duration                  Peripheral IV - Single Lumen 01/06/25 0923 20 G Anterior;Right Forearm 1 day                    Significant Labs:    CBC/Anemia Profile:  Recent Labs   Lab 01/07/25  0526 01/07/25  0859 01/07/25 1910 01/07/25  1913   WBC 7.23 7.11 21.94*  --    HGB 13.6* 13.3* 10.5*  --    HCT 41.5 41.0 31.7* 28*    188 147*  --    * 103* 104*  --    RDW 13.6 13.6 13.9  --         Chemistries:  Recent Labs   Lab 01/06/25  0925 01/07/25  0526 01/07/25  0859    141 140   K 3.7 3.9 3.9    107 106   CO2 22* 24 24   BUN 21 18 18   CREATININE 1.8* 1.6* 1.6*   CALCIUM 10.4 9.7 9.6   ALBUMIN  --   --  3.4*   PROT  --   --  6.0   BILITOT  --   --  0.6   ALKPHOS  --   --  92   ALT  --   --  20   AST  --   --  28     ABG  Recent Labs   Lab 01/07/25 1913   PH 7.310*   PO2 188*   PCO2 54.5*   HCO3 27.5   BE 1       All pertinent labs within the past 24 hours have been reviewed.    Significant Imaging:   I have reviewed all pertinent imaging results/findings within the past 24 hours.    ABG  Recent Labs   Lab 01/07/25 1913   PH 7.310*   PO2 188*   PCO2 54.5*   HCO3 27.5   BE 1     Assessment/Plan:     Pulmonary  On mechanically assisted ventilation  Post CTS  VAP prophylaxis  Abg reviewed  Plan wean to extubate per protocol  once awake post anesthesia    Cardiac/Vascular  * S/P CABG x 4  CTS managing  Plan ASA, plavix, metoprolol in am    Primary hypertension  Now post on support  Monitor and resume antihypertensives as indicated    Renal/  Stage 3b chronic kidney disease  No documented/reported history; based of pre admit labs with no prior available for review  Creatine stable for now.   BMP reviewed- noted Estimated Creatinine Clearance: 41.8 mL/min (A) (based on SCr of 1.6 mg/dL (H)). according to latest data. Based on current GFR, CKD stage is stage 3 - GFR 30-59.   Monitor UOP and serial BMP and adjust therapy as needed.   Renally dose meds. Avoid nephrotoxic medications and procedures.    Endocrine  Graves' disease  Check TSH, T4 for baseline (no prior labs available)  Continue outpatient dose tapazole    Other  Currently smokes tobacco  Unclear pack year history or willingness to stop  Address with education and recommendation for cessation post extubation        Critical Care Daily Checklist:    A: Awake: RASS Goal/Actual Goal:    Actual:     B: Spontaneous Breathing Trial Performed?     C: SAT & SBT Coordinated?  Plan once waking post anesthesia                      D: Delirium: CAM-ICU     E: Early Mobility Performed? Yes   F: Feeding Goal:    Status:     Current Diet Order   No orders of the defined types were placed in this encounter.      AS: Analgesia/Sedation Per CTS   T: Thromboembolic Prophylaxis Per CTS   H: HOB > 300 Yes   U: Stress Ulcer Prophylaxis (if needed) pepcid   G: Glucose Control Post op CTS monitoring and management   B: Bowel Function     I: Indwelling Catheter (Lines & Paul) Necessity reviewed   D: De-escalation of Antimicrobials/Pharmacotherapies reviewed    Plan for the day/ETD As above    Code Status: Full Code     Critical Care Time: 35 minutes  Critical secondary to mechanical ventilation post CTS   Critical care was time spent personally by me on the following activities: development of  treatment plan with patient or surrogate and bedside caregivers, discussions with consultants, evaluation of patient's response to treatment, examination of patient, ordering and performing treatments and interventions, ordering and review of laboratory studies, ordering and review of radiographic studies, pulse oximetry, re-evaluation of patient's condition. This critical care time did not overlap with that of any other provider or involve time for any procedures.    Thank you for your consult. Critical Care team will follow and assist while requiring ICU level care.     EBONY Holland-BC  Critical Care Medicine  [facility] - Intensive Care ([facility])

## 2025-01-08 NOTE — OP NOTE
[facility] -   Cardiothoracic Surgery  Operative Note      DATE OF PROCEDURE: 01/07/2025    ATTENDING SURGEON:  Sidra Guardado M.D.     ASSISTANT:      ANESTHESIOLOGIST:  Dr. Rodas    Pre-op Diagnosis: Coronary artery disease involving native coronary artery of native heart with unstable angina pectoris [I25.110]  Hypertension  Hyperlipidemia  COPD  Renal insufficiency  Peripheral arterial disease  Coronary artery disease    Post-op Diagnosis:  Same     Procedure(s):  CORONARY ARTERY BYPASS GRAFT (CABG)  SURGICAL PROCUREMENT, VEIN, ENDOSCOPIC  ECHOCARDIOGRAM,TRANSESOPHAGEAL  BLOCK, NERVE, INTERCOSTAL, 2 OR MORE      PROCEDURAL SUMMARY:   Coronary artery bypass graft, x 4  1) Pedicled LIMA to LAD  2) Saphenous vein graft to PDA  3) Saphenous vein graft to obtuse marginal  4) Saphenous vein graft to diagonal     Endoscopic vein harvesting from the left leg   Multiple intercostal nerve blocks with 0.25% Marcaine   Transesophageal echocardiogram findings:   1) Estimated ejection fraction 55 %   2) Ascending aorta without significant calcifications   3) Apical motion improved status post bypass completion     ANESTHESIA:  General endotracheal    PATIENT POSITION: Supine     ESTIMATED BLOOD LOSS:  400 mL     DESCRIPTION OF OPERATIVE FINDINGS AND OUTCOME: Successful coronary revascularization without apparent complications    IMPLANTS:   Implant Name Type Inv. Item Serial No.  Lot No. LRB No. Used Action   HEMOSTASIS OSTENE MAT 2.5GM - [id number]  HEMOSTASIS OSTENE MAT 2.5GM  [employer] MARCE [id number]  1 Implanted   Streamline Bipolar TemporaryMyocardial Pacing Lead     [id number]  1 Implanted   Bakari Radiomark Graft Marker     [id number]  3 Implanted   Sternal Zipfix with Needle     [id number]  1 Implanted       SPECIMENS:   Specimen (24h ago, onward)      None             DISPOSITION: ICU - intubated and critically ill.    ATTESTATION: I was present and scrubbed for the entire  procedure.       BRIEF HISTORY:  Leonidas Bautista is a 74 y.o. male that presented urgently and was found to have stable angina. A cardiac cath was done and the patient was found to have 4 vessel disease.  The patients echocardiogram showed  ejection fraction 55%. The carotid ultrasound showed insignificant coronary artery stenosis. Cardiac risk factors include HTN, HLD, Obesity, Tobacco use, Alcohol use, PAD, Kidney disease, and JESSICA. The patient's NYHA Functional Classification score is NYHA I: cardiac disease, but without resulting limitations of physical activity.   The patient was consented for coronary artery bypass grafting after explaining the risks (ncluding but not limited to stroke, bleeding, kidney injury, and death), benefits and alternatives. All the patients questions were answered and the patient agrees with the plan to proceed with surgery.     PROCEDURE IN DETAIL:  The patient was brought to the operating room and placed on the operating room table in the supine position. A sanchez catheter placed by the surgical team. After adequate induction of general endotracheal anesthesia and placement of an arterial line, a central venous line, and a Denton-Nicole catheter by the anesthesia team, the patient's chest, abdomen, pelvis, and bilateral lower extremities were prepped and draped in the usual sterile fashion.  Surgical time-out was then done.     Skin incisions were made over the right lower extremity. The saphenous vein was harvested using an endoscopic technique, and the vein was checked for its quality. Once the vein was harvested, the leg was closed in multiple layers of absorbable suture. A REBECCA drain was left in the leg, and then the leg was wrapped and dressed. The vein was kept in a vein preservation solution.    A standard median sternotomy was performed. Hemostasis was achieved to the sternal edges. Rultract retractor was placed. The left internal mammary artery was dissected as a pedicle with Bovie  electrocautery. A pleural drain was placed. Full heparin was given, and the ACT was checked to ensure that the level was greater than 500 seconds. The left internal mammary artery was then divided and checked to ensure that it had good flow. The pedicle was sprayed with dilute papaverine.    A chest retractor was placed. Next, the thymic remnants were divided and the pericardium was opened along the midline. A pericardial well was then created by placing stay sutures.     Cannulation sutures were placed first in the aorta and then in the right atrial appendage. The aortic cannula was inserted, followed by the venous cannula. Antegrade and retrograde cardioplegia catheters were then placed.  Cardiopulmonary bypass was then begun.  Once on bypass, the aortic crossclamp was applied and the heart was arrested using cold blood enhanced antegrade cardioplegia followed by retrograde cardioplegia.  A prompt electromechanical arrest was achieved. Retrograde cardioplegia was administered every 15 minutes while the aorta was crossclamped.     We first addressed the anastomosis between the Saphenous vein graft to PDA. A site suitable for grafting on the heart was first located, and an arteriotomy was then made. The graft was then brought up into the surgical field, and its end was spatulated. The distal end to side anastomosis was performed using a running 7-0 Prolene suture.  After completion of the anastomosis, it was tested to ensure hemostasis and suitable flow. A dose of cardioplegia was given antegrade down this graft.     We then turned our attention to the second bypass between the Saphenous vein graft to obtuse marginal. A site suitable for grafting on the heart was first located, and an arteriotomy was then made. The graft was then brought up into the surgical field, and its end was spatulated. The distal end to side anastomosis was performed using a running 7-0 Prolene suture.  After completion of the anastomosis, it  was tested to ensure hemostasis and adequate flow. A dose of cardioplegia was given antegrade down this graft.    I looked at the diagonal which was a 2 mm vessel calcified diffuse coronary calcification present all over the vasculature I found a soft spot in the mid segment end-to-side anastomosis was done with the vein graft using 7 0 Prolene    Next, we turned our attention to the anterior wall of the heart.  Left anterior descending artery was found to be heavily calcified with soft plaques We identified a site along the Mid LAD that was suitable for grafting. An arteriotomy was made, and the internal mammary artery was checked to ensure appropriate length and suitable flow. The end-to-side anastomosis was performed using a running 7-0 Prolene suture.  After completion, it was tested and found to be hemostatic. The mammary pedicle was tacked to the heart using two 6-0 Prolene sutures.    The aortic cross-clamp was released, and we checked to ensure that the patient returned to sinus rhythm. We then applied a side-biting clamp the aorta and performed 3 aortotomies for the proximal anastomoses of the remaining grafts.  These were completed in a similar fashion, using an #11 blade to incise the aorta and a 4.4 mm aortic punch device to enlarge the aortotomy. The grafts were checked to ensure that there was no kinking or torsion, and then they were cut to an appropriate length. Their ends were spatulated, and a running 6 0 Prolene suture was used to complete the proximal end to side anastomoses.  Afterwards, the aortic side-biting clamp was released. The grafts were de-aired before reestablishing the blood flow. The patient was rewarmed to 37° centigrade  The patient was subsequently weaned from cardiopulmonary bypass.    The patient did wean from bypass without any issues. Once off bypass, all surgical sites were inspected to ensure suitable hemostasis. A test dose of protamine was administered, and this was well  tolerated. The total dose was then given. Payne through the total dose, all cannulas were removed. All surgical sites were again inspected and checked for good hemostasis. Ventricular pacing wires were placed. Mediastinal drains were placed. After checking for adequate hemostasis again, the patient's chest was closed using 3 #6 stainless steel wires in a simple, interrupted fashion to close the manubrium and 4 zip ties to close the body of the sternum and the xiphoid process. Multiple intercostal nerve blocks with 0.25% Marcaine was administered parasternally for postoperative pain control.  The overlying soft tissues were re-approximated in multiple layers using vicryl and monocryl sutures. The patient's chest was washed and dried, and a sterile dressing was applied. At the conclusion of the case, sponge and instrument counts were correct. The patient tolerated the procedure without apparent complications and was promptly moved to the ICU, and I was present for the handoff.      6:40 PM   01/07/2025    Sidra Guardado MD  Cardiothoracic Surgery  [facility]

## 2025-01-08 NOTE — ASSESSMENT & PLAN NOTE
-Recovering well post-CABG  -Wean pressor support as tolerated  -Continue ASA, Plavix, BB, CCB  -Needs statin on board  -IS usage and ambulation when able

## 2025-01-08 NOTE — SUBJECTIVE & OBJECTIVE
Review of Systems   Constitutional: Positive for malaise/fatigue.   HENT: Negative.     Eyes: Negative.    Cardiovascular:  Positive for chest pain (incisonal).   Respiratory: Negative.     Endocrine: Negative.    Hematologic/Lymphatic: Negative.    Skin: Negative.    Musculoskeletal:  Positive for arthritis and joint pain.   Gastrointestinal: Negative.    Genitourinary: Negative.    Neurological: Negative.    Psychiatric/Behavioral: Negative.     Allergic/Immunologic: Negative.      Objective:     Vital Signs (Most Recent):  Temp: 98.1 °F (36.7 °C) (01/08/25 1200)  Pulse: 84 (01/08/25 1345)  Resp: (!) 37 (01/08/25 1345)  BP: 122/63 (01/08/25 1300)  SpO2: 95 % (01/08/25 1345) Vital Signs (24h Range):  Temp:  [97.5 °F (36.4 °C)-99 °F (37.2 °C)] 98.1 °F (36.7 °C)  Pulse:  [52-91] 84  Resp:  [16-51] 37  SpO2:  [91 %-100 %] 95 %  BP: ()/(48-91) 122/63  Arterial Line BP: ()/(46-76) 99/53     Weight: 82.1 kg (181 lb)  Body mass index is 25.97 kg/m².     SpO2: 95 %         Intake/Output Summary (Last 24 hours) at 1/8/2025 1353  Last data filed at 1/8/2025 1300  Gross per 24 hour   Intake 5714.55 ml   Output 1987 ml   Net 3727.55 ml       Lines/Drains/Airways       Central Venous Catheter Line  Duration             Percutaneous Central Line - Triple Lumen  01/07/25 1351 Internal Jugular Right 1 day              Drain  Duration                  Urethral Catheter 01/07/25 1350 Double-lumen;Silicone;Non-latex;Straight-tip;Temperature probe 16 Fr. 1 day         Closed/Suction Drain 01/07/25 1511 Tube - 1 Right Leg Bulb 19 Fr. <1 day         Closed/Suction Drain 01/07/25 1512 Tube - 2 Right Leg Bulb 19 Fr. <1 day         Y Chest Tube 1 and 2 01/07/25 1641 1 Right Mediastinal 24 Fr. 2 Left Mediastinal 24 Fr. <1 day         Y Chest Tube 3 and 4 01/07/25 1642 3 Left Pleural 24 Fr. Right Pleural 24 Fr. <1 day              Arterial Line  Duration             Arterial Line 01/07/25 1349 Right Radial 1 day               "Line  Duration                  Pacer Wires 01/07/25 1915 <1 day              Peripheral Intravenous Line  Duration                  Peripheral IV - Single Lumen 01/06/25 0923 20 G Anterior;Right Forearm 2 days                       Physical Exam  Vitals and nursing note reviewed.   Constitutional:       Appearance: He is ill-appearing.   HENT:      Head: Normocephalic and atraumatic.   Eyes:      Pupils: Pupils are equal, round, and reactive to light.   Cardiovascular:      Rate and Rhythm: Normal rate and regular rhythm.      Heart sounds: S1 normal and S2 normal.      Comments: Sternotomy site C/D/I; dressing in place    Chest tubes in place  Pulmonary:      Effort: Pulmonary effort is normal.      Comments: Diminished BS  Abdominal:      Palpations: Abdomen is soft.   Musculoskeletal:      Right lower leg: Edema present.      Left lower leg: Edema present.   Skin:     General: Skin is warm and dry.   Psychiatric:         Mood and Affect: Mood normal.         Behavior: Behavior normal.            Significant Labs: CMP   Recent Labs   Lab 01/07/25  0859 01/07/25 1910 01/08/25  0347    139 140   K 3.9 5.2* 3.6    109 108   CO2 24 22* 18*   GLU 94 134* 280*   BUN 18 20 26*   CREATININE 1.6* 1.7* 2.3*   CALCIUM 9.6 9.8 9.4   PROT 6.0 4.0* 5.0*   ALBUMIN 3.4* 2.4* 3.5   BILITOT 0.6 0.4 0.5   ALKPHOS 92 61 61   AST 28 43* 32   ALT 20 17 15   ANIONGAP 10 8 14   , CBC   Recent Labs   Lab 01/07/25  0859 01/07/25 1910 01/07/25 1913 01/08/25  0347   WBC 7.11 21.94*  --  18.34*   HGB 13.3* 10.5*  --  9.5*   HCT 41.0 31.7*   < > 29.1*    147*  --  150    < > = values in this interval not displayed.   , Troponin No results for input(s): "TROPONINIHS" in the last 48 hours., and All pertinent lab results from the last 24 hours have been reviewed.    Significant Imaging: Echocardiogram: Transthoracic echo (TTE) complete (Cupid Only): No results found for this or any previous visit., EKG: Reviewed, and X-Ray: " CXR: X-Ray Chest 1 View (CXR): No results found for this visit on 01/06/25. and X-Ray Chest PA and Lateral (CXR): No results found for this visit on 01/06/25.

## 2025-01-08 NOTE — PROGRESS NOTES
[facility] ([facility])  Cardiology  Progress Note    Patient Name: Leonidas Bautista  MRN: [id number]  Admission Date: 1/6/2025  Hospital Length of Stay: 0 days  Code Status: Full Code   Attending Physician: Sidra Guardado MD   Primary Care Physician: No primary care provider on file.  Expected Discharge Date:   Principal Problem:S/P CABG x 4    Subjective:   HPI:  Patient is a 74 y.o. male presents with H/O CAD HTN HLP TOBACCO USE PALPITATION NSVT BY HOLTER HAD AN ABNORMAL CARDIOLITE .WITH SIGNIFICANT DEFECT IN LAD DISTRIBUTION. HE WAS REFERED FOR CATH DY DR GOOD.     Hospital Course:   1/7/25-Patient seen and examined today, sitting up in bed, s/p LHC yesterday which showed multivessel CAD. CABG planned today. CP free during exam. Admits to being anxious. Labs stable.    1/8/25-Patient seen and examined today, s/p CABG x 4,  post-op day # 1. Feels ok. Weak/tired. Reports incisional pain. On low dose Epi. Labs reviewed. Creatinine 2.3. H/H 9.5/29.1.        Review of Systems   Constitutional: Positive for malaise/fatigue.   HENT: Negative.     Eyes: Negative.    Cardiovascular:  Positive for chest pain (incisonal).   Respiratory: Negative.     Endocrine: Negative.    Hematologic/Lymphatic: Negative.    Skin: Negative.    Musculoskeletal:  Positive for arthritis and joint pain.   Gastrointestinal: Negative.    Genitourinary: Negative.    Neurological: Negative.    Psychiatric/Behavioral: Negative.     Allergic/Immunologic: Negative.      Objective:     Vital Signs (Most Recent):  Temp: 98.1 °F (36.7 °C) (01/08/25 1200)  Pulse: 84 (01/08/25 1345)  Resp: (!) 37 (01/08/25 1345)  BP: 122/63 (01/08/25 1300)  SpO2: 95 % (01/08/25 1345) Vital Signs (24h Range):  Temp:  [97.5 °F (36.4 °C)-99 °F (37.2 °C)] 98.1 °F (36.7 °C)  Pulse:  [52-91] 84  Resp:  [16-51] 37  SpO2:  [91 %-100 %] 95 %  BP: ()/(48-91) 122/63  Arterial Line BP: ()/(46-76) 99/53     Weight: 82.1 kg (181 lb)  Body mass index is 25.97  kg/m².     SpO2: 95 %         Intake/Output Summary (Last 24 hours) at 1/8/2025 1353  Last data filed at 1/8/2025 1300  Gross per 24 hour   Intake 5714.55 ml   Output 1987 ml   Net 3727.55 ml       Lines/Drains/Airways       Central Venous Catheter Line  Duration             Percutaneous Central Line - Triple Lumen  01/07/25 1351 Internal Jugular Right 1 day              Drain  Duration                  Urethral Catheter 01/07/25 1350 Double-lumen;Silicone;Non-latex;Straight-tip;Temperature probe 16 Fr. 1 day         Closed/Suction Drain 01/07/25 1511 Tube - 1 Right Leg Bulb 19 Fr. <1 day         Closed/Suction Drain 01/07/25 1512 Tube - 2 Right Leg Bulb 19 Fr. <1 day         Y Chest Tube 1 and 2 01/07/25 1641 1 Right Mediastinal 24 Fr. 2 Left Mediastinal 24 Fr. <1 day         Y Chest Tube 3 and 4 01/07/25 1642 3 Left Pleural 24 Fr. Right Pleural 24 Fr. <1 day              Arterial Line  Duration             Arterial Line 01/07/25 1349 Right Radial 1 day              Line  Duration                  Pacer Wires 01/07/25 1915 <1 day              Peripheral Intravenous Line  Duration                  Peripheral IV - Single Lumen 01/06/25 0923 20 G Anterior;Right Forearm 2 days                       Physical Exam  Vitals and nursing note reviewed.   Constitutional:       Appearance: He is ill-appearing.   HENT:      Head: Normocephalic and atraumatic.   Eyes:      Pupils: Pupils are equal, round, and reactive to light.   Cardiovascular:      Rate and Rhythm: Normal rate and regular rhythm.      Heart sounds: S1 normal and S2 normal.      Comments: Sternotomy site C/D/I; dressing in place    Chest tubes in place  Pulmonary:      Effort: Pulmonary effort is normal.      Comments: Diminished BS  Abdominal:      Palpations: Abdomen is soft.   Musculoskeletal:      Right lower leg: Edema present.      Left lower leg: Edema present.   Skin:     General: Skin is warm and dry.   Psychiatric:         Mood and Affect: Mood  "normal.         Behavior: Behavior normal.            Significant Labs: CMP   Recent Labs   Lab 01/07/25  0859 01/07/25 1910 01/08/25  0347    139 140   K 3.9 5.2* 3.6    109 108   CO2 24 22* 18*   GLU 94 134* 280*   BUN 18 20 26*   CREATININE 1.6* 1.7* 2.3*   CALCIUM 9.6 9.8 9.4   PROT 6.0 4.0* 5.0*   ALBUMIN 3.4* 2.4* 3.5   BILITOT 0.6 0.4 0.5   ALKPHOS 92 61 61   AST 28 43* 32   ALT 20 17 15   ANIONGAP 10 8 14   , CBC   Recent Labs   Lab 01/07/25  0859 01/07/25 1910 01/07/25 1913 01/08/25  0347   WBC 7.11 21.94*  --  18.34*   HGB 13.3* 10.5*  --  9.5*   HCT 41.0 31.7*   < > 29.1*    147*  --  150    < > = values in this interval not displayed.   , Troponin No results for input(s): "TROPONINIHS" in the last 48 hours., and All pertinent lab results from the last 24 hours have been reviewed.    Significant Imaging: Echocardiogram: Transthoracic echo (TTE) complete (Cupid Only): No results found for this or any previous visit., EKG: Reviewed, and X-Ray: CXR: X-Ray Chest 1 View (CXR): No results found for this visit on 01/06/25. and X-Ray Chest PA and Lateral (CXR): No results found for this visit on 01/06/25.  Assessment and Plan:   Patient who presents with abnormal stress test/multivessel CAD. Recovering s/p CABG. Continue post-op mgmt as per CTS.    * S/P CABG x 4  -Recovering well post-CABG  -Wean pressor support as tolerated  -Continue ASA, Plavix, BB, CCB  -Needs statin on board  -IS usage and ambulation when able    Other hyperlipidemia  -Needs statin on board      Primary hypertension  -Continue home meds  -Titrate medications    Coronary artery disease involving native coronary artery of native heart without angina pectoris  -See plan under abnormal stress test    Abnormal nuclear stress test  -s/p LHC which showed multivessel CAD  -CABG planned today  -Continue ASA, Imdur, ARB, Toprol, CCB    See plan under CABG        VTE Risk Mitigation (From admission, onward)           Ordered     " Place sequential compression device  Until discontinued         01/07/25 1839     IP VTE HIGH RISK PATIENT  Once         01/07/25 1839                    Esmer Haddad PA-C  Cardiology  [facility] ([facility])

## 2025-01-08 NOTE — PLAN OF CARE
Nutrition Recommendations/Interventions 1/8/25:   1. Recommend pt continues on a Cardiac diet   2. If PO intake 50% or < x 2 days, recommend Suplena BID to assist filling nutritional gaps   3. Recommend pt continues to receive 500 mg BID vitamin C supplement for wound healing, per MD/NP   4. Encourage PO intake, recommend feeding assistance if warranted   5. Weekly weight  6. Collaboration by nutrition professional with other providers     Goals:   1. Pt will tolerate and consume > 75% EEN and EPN prior to RD follow up   2. Pt will continue to receive vitamin C supplement prior to RD follow up    DILLON Bey, RDN, LDN

## 2025-01-08 NOTE — PT/OT/SLP EVAL
"Physical Therapy Evaluation and Treatment    Patient Name:  Leonidas Bautista   MRN:  [id number]    Recommendations:     Discharge Recommendations: Low Intensity Therapy (WITH 24 HOUR CARE)   Discharge Equipment Recommendations: shower chair   Barriers to discharge: None    Assessment:     Leonidas Bautista is a 74 y.o. male admitted with a medical diagnosis of S/P CABG x 4.  He presents with the following impairments/functional limitations: weakness, impaired endurance, impaired functional mobility, gait instability, pain, decreased safety awareness, decreased upper extremity function, decreased coordination, impaired cardiopulmonary response to activity, impaired balance.    Rehab Prognosis: Good; patient would benefit from acute skilled PT services to address these deficits and reach maximum level of function.    Recent Surgery: Procedure(s) (LRB):  CORONARY ARTERY BYPASS GRAFT (CABG) (N/A)  SURGICAL PROCUREMENT, VEIN, ENDOSCOPIC (Right)  ECHOCARDIOGRAM,TRANSESOPHAGEAL (N/A)  BLOCK, NERVE, INTERCOSTAL, 2 OR MORE (N/A) 1 Day Post-Op    Plan:     During this hospitalization, patient to be seen 3 x/week to address the identified rehab impairments via gait training, therapeutic activities, therapeutic exercises and progress toward the following goals:    Plan of Care Expires:  01/22/25    Subjective     Chief Complaint: PAIN, "I FEEL OFF"  "I FEEL HEAVY" -SITTING AT EOB, PT WITH DROP IN BP SITTING EOB, C/O MILD DIZZINESS  Patient/Family Comments/goals: EAGER TO MOVE  Pain/Comfort:  Pain Rating 1: 5/10  Location 1:  (CHEST TUBE SITE)  Pain Addressed 1: Pre-medicate for activity  Pain Rating Post-Intervention 1: 5/10  Pain Rating 2: 7/10  Location - Side 2: Right  Location - Orientation 2: posterior  Location 2: shoulder    Patients cultural, spiritual, Druze conflicts given the current situation:      Living Environment:  PT LIVES ALONE 1 STORY HOUSE 4 STEPS TO ENTER WITH RAIL, PT AMB INDEP COMMUNITY DISTANCES, DRIVES, " RETIRED, INDEP WITH ADL'S.  PT REPORTS HIS DAUGHTER FROM [place] WILL BE COMING TO STAY WITH HIM DURING HIS RECOVERY  Prior to admission, patients level of function was INDEP.  Equipment used at home: none.  DME owned (not currently used): none.  Upon discharge, patient will have assistance from DAUGHTER.    Objective:     Communicated with NURSE ARTURO prior to session.  Patient found supine with arterial line, blood pressure cuff, central line, chest tube, sanchez catheter, external pacer, pulse ox (continuous), telemetry, oxygen, REBECCA drain, peripheral IV  upon PT entry to room.    General Precautions: Standard, fall, sternal  Orthopedic Precautions:N/A   Braces: N/A  Respiratory Status: Nasal cannula, flow 2 L/min    Exams:  Cognitive Exam:  Patient is oriented to Person, Place, Time, and Situation  Postural Exam:  Patient presented with the following abnormalities:    -       Rounded shoulders  -       Forward head  Sensation:    -       Intact  RLE ROM: WFL  RLE Strength: WFL  LLE ROM: WFL  LLE Strength: WFL    Functional Mobility:  Bed Mobility:   SLOW MOVING WITH MOBILITY, SLOW/DELAYED.  ALSO EXTENDED TIME FOR LINE MANAGEMENT  Rolling Right: minimum assistance  Scooting: minimum assistance  Supine to Sit: minimum assistance  Transfers:     Sit to Stand:  minimum assistance with no AD  Bed to Chair: minimum assistance with  no AD  using  Step Transfer  Balance: FAIR- SITTING BALANCE, POOR+ DYNAMIC BALANCE DURING TF  PT TOLERATED UN-ASSISTED SITTING AT EOB FOR APPROXIMATELY 10 MINUTES WITH CGA FOCUSING ON TOLERANCE TO UPRIGHT POSITION, CORE STABILITY, AND CV ENDURANCE.  DROP IN BP,  REPORTS MILD DIZZINESS-NURSE PRESENT AND AWARE  PT EDUCATED IN AND PERFORMED SEATED BLE THEREX X 5 REPS AROM WITH REST AS NEEDED: HIP FLEX/EXT, LAQ, QUAD SET, HEEL SLIDES, AP'S  PT EDUCATED IN KYMITT STERNAL PRECAUTIONS IN RELATION TO FUNCTIONAL MOBILITY- HANDOUT ISSUED  PT RECLINED PT IN RECLINER FOR REST AND TO ALLOW BP TO  "STABILIZE    AM-PAC 6 CLICK MOBILITY  Total Score:14     Treatment & Education:  PT EDUCATION:  - ROLE OF P.T. AND POC IN ACUTE CARE HOSPITAL SETTING  - ENCOURAGED TO INCREASE TIME OOB IN CHAIR TO TOLERANCE   - TO CONTINUE THERAPUETIC EXERCISES THROUGHOUT THE DAY TO INCREASE ACTIVITY TOLERANCE AND DECREASE RISK FOR PNEUMONIA AND BLOOD CLOTS: HIP FLEX/EXT, HIP ABD/ADD, QUAD SET, HEEL SLIDE, AP  - RISK FOR FALLS DUE TO GENERALIZED WEAKNESS, EDUCATED ON "CALL DON'T FALL", ENCOURAGED TO CALL FOR ASSISTANCE WITH ALL NEEDS SUCH AS BED<>CHAIR TRANSFERS OR TRIPS TO BATHROOM, PT AGREEABLE TO SAFETY PRECAUTIONS    Patient left up in chair with all lines intact, call button in reach, chair alarm on, NURSE notified, and NURSE present.    GOALS:   Multidisciplinary Problems       Physical Therapy Goals          Problem: Physical Therapy    Goal Priority Disciplines Outcome Interventions   Physical Therapy Goal     PT, PT/OT     Description: LTG'S TO BE MET IN 14 DAYS (1-22-25)  PT WILL REQUIRE SPV FOR BED MOBILITY  PT WILL REQUIRE SPV FOR BED<>CHAIR TF'S  PT WILL  FEET WITH OR WITHOUT RW AND SPV  PT WILL INC AMPAC SCORE BY 2 POINTS TO PROGRESS GROSS FUNC MOBILITY                         History:     Past Medical History:   Diagnosis Date    Abnormal nuclear stress test 01/05/2025    Bilateral carotid artery stenosis 01/05/2025    CAD S/P percutaneous coronary angioplasty 2003    Stent mid LAD July 15, 2003    Carotid artery disease     Coronary artery disease involving native coronary artery of native heart without angina pectoris 01/05/2025    Diastolic dysfunction     Essential (primary) hypertension     Resistent    Graves disease     hyperthroidism    Hyperlipidemia     Hypertensive heart disease     Other hyperlipidemia 01/05/2025    Presence of drug coated stent in LAD coronary artery 01/05/2025    Primary hypertension 01/05/2025       Past Surgical History:   Procedure Laterality Date    ANGIOGRAPHY OF INTERNAL " MAMMARY VESSEL Left 1/6/2025    Procedure: Angiogram Internal Mammary;  Surgeon: Erika Gomes MD;  Location: [facility] CATH LAB;  Service: Cardiology;  Laterality: Left;    ARTERIOGRAPHY OF SUBCLAVIAN ARTERY Left 1/6/2025    Procedure: ARTERIOGRAM, SUBCLAVIAN;  Surgeon: Erika Gomes MD;  Location: [facility] CATH LAB;  Service: Cardiology;  Laterality: Left;    LEFT HEART CATHETERIZATION Left 1/6/2025    Procedure: Left heart cath;  Surgeon: Erika Gomes MD;  Location: [facility] CATH LAB;  Service: Cardiology;  Laterality: Left;    ptca with Stent mid LAD in 2003 N/A        Time Tracking:     PT Received On: 01/08/25  PT Start Time: 1100     PT Stop Time: 1140  PT Total Time (min): 40 min     Billable Minutes: Evaluation 15 and Therapeutic Activity 25 01/08/2025

## 2025-01-08 NOTE — ASSESSMENT & PLAN NOTE
Unclear pack year history or willingness to stop  Address with education and recommendation for cessation when appropriate

## 2025-01-08 NOTE — PLAN OF CARE
Pt extubated at 0540 to 4LNC. Precedex gtt off. SR/SB w/1st deg AVB and occasionally v-paced on monitor. Afebrile. CO/CI 4-4.2/2-2.2. Epi gtt infusing. Hemodynamically labile overnight w/high SVRs. 1L albumin given. Cardene gtt on for a short time, BPs now improved. CTs w/ 10-20mL/hr of serosanguinous OP each. MSI WNL. ~580mL UOP per sanchez. Q1H CBGs; insulin gtt titrated per nomogram. Q2 turns, heels floated. Full CHG bath and linen change this AM. Fall precautions in place, bed alarm on. POC discussed w/pt, AGNIESZKA

## 2025-01-08 NOTE — ASSESSMENT & PLAN NOTE
Post CTS  VAP prophylaxis  Abg reviewed  Plan wean to extubate per protocol once awake post anesthesia    1/8 - extubated early this morning.  Stable on NC currently.

## 2025-01-08 NOTE — PT/OT/SLP EVAL
Occupational Therapy   Evaluation & Treatment    Name: Leonidas Bautista  MRN: [id number]  Admitting Diagnosis: S/P CABG x 4  Recent Surgery: Procedure(s) (LRB):  CORONARY ARTERY BYPASS GRAFT (CABG) (N/A)  SURGICAL PROCUREMENT, VEIN, ENDOSCOPIC (Right)  ECHOCARDIOGRAM,TRANSESOPHAGEAL (N/A)  BLOCK, NERVE, INTERCOSTAL, 2 OR MORE (N/A) 1 Day Post-Op    Recommendations:     Discharge Recommendations: Low Intensity Therapy  Discharge Equipment Recommendations:  shower chair  Barriers to discharge:       Assessment:     Leonidas Bautista is a 74 y.o. male with a medical diagnosis of S/P CABG x 4. Performance deficits affecting function: decreased upper extremity function, gait instability, weakness, decreased lower extremity function, impaired balance, impaired endurance, decreased safety awareness, pain, orthopedic precautions, impaired self care skills, impaired functional mobility, decreased coordination.      Rehab Prognosis: Good; patient would benefit from acute skilled OT services to address these deficits and reach maximum level of function.       Plan:     Patient to be seen 2 x/week to address the above listed problems via self-care/home management, therapeutic activities, therapeutic exercises  Plan of Care Expires: 01/22/25  Plan of Care Reviewed with: patient    Subjective     Chief Complaint: Pain and confusion  Patient/Family Comments/goals: Increase independence and pain management     Occupational Profile:  Living Environment: Lives alone in single story home with 4 steps to enter, hand rails present  Previous level of function: independent with self-care and functional mobility both household and community distances  Roles and Routines: Drives, retired  Equipment Used at Home: none  Assistance upon Discharge: Patient reports his daughter is arriving from [place] to stay with him at home for recovery    Pain/Comfort:  Pain Rating 1: 5/10  Location - Orientation 1: generalized  Location 1: sternal  Pain Addressed 1:  Pre-medicate for activity, Distraction  Pain Rating Post-Intervention 1: 5/10    Patients cultural, spiritual, Tenriism conflicts given the current situation:      Objective:     Communicated with: Nurse prior to session.  Patient found supine with arterial line, blood pressure cuff, external pacer, chest tube, sanchez catheter, REBECCA drain, peripheral IV, pulse ox (continuous), telemetry upon OT entry to room.    General Precautions: Standard, fall, sternal  Orthopedic Precautions:    Braces:    Respiratory Status: Nasal cannula, flow 2 L/min    Occupational Performance:    Bed Mobility:    Patient completed Rolling/Turning to Right with minimum assistance  Patient completed Scooting/Bridging with contact guard assistance  Patient completed Supine to Sit with minimum assistance    Functional Mobility/Transfers:  Patient completed Sit <> Stand Transfer with minimum assistance  with  hand-held assist   Patient completed Bed <> Chair Transfer using Stand Pivot technique with minimum assistance with hand-held assist  Functional Mobility: Unable to ambulate at this time due to multiple lines and reports of dizziness; quick to fatigue and LE unsteadiness     Activities of Daily Living:  Lower Body Dressing: dependence donning hospital socks    Cognitive/Visual Perceptual:  Cognitive/Psychosocial Skills:     -       Oriented to: Person and Situation   -       Follows Commands/attention:Follows two-step commands  -       Memory: No Deficits noted  -       Safety awareness/insight to disability: impaired     Physical Exam:  Balance:    -       fair  Upper Extremity Range of Motion:     -       Right Upper Extremity: WFL  -       Left Upper Extremity: WFL  Upper Extremity Strength:    -       Right Upper Extremity: grossly 3+/5  -       Left Upper Extremity: grossly 3+/5   Strength:    -       Right Upper Extremity: WFL  -       Left Upper Extremity: WFL    AMPAC 6 Click ADL:  AMPAC Total Score: 14    Treatment &  Education:  OT evaluation completed to assess patient's current level of function. Pt presents with the above stated deficits, limiting his ability to perform safe and independent ADLs and functional mobility. Pt will benefit from skilled OT services to increase functional independence.  Pt slightly anxious, requiring verbal cues for calming techniques. Pt's BP decreased with movement, requiring increased time to recover.  Patient educated on, and provided with handout regarding sternal precautions.   Pt encouraged to sit upright in chair as often as he can tolerate to increase functional endurance and prevent pneumonia. Pt verbalized understanding.   Pt's LE's eventually elevated in recliner due to decrease in BP. BP returned back to normal with this adjustment.   Pt educated on use of call button for nursing assistance when ready to return to bed to prevent falls. Pt verbalized understanding.    Patient left up in chair with all lines intact, call button in reach, chair alarm on, and nursing notified    GOALS:   Multidisciplinary Problems       Occupational Therapy Goals          Problem: Occupational Therapy    Goal Priority Disciplines Outcome Interventions   Occupational Therapy Goal     OT, PT/OT     Description: Goals to be met by: 1/22/25     Patient will increase functional independence with ADLs by performing:    Toileting from bedside commode with Minimal Assistance for hygiene and clothing management.   Toilet transfer to bedside commode with Minimal Assistance.  Upper extremity exercise program x10 reps per handout, with supervision within sternal precautions.                         History:     Past Medical History:   Diagnosis Date    Abnormal nuclear stress test 01/05/2025    Bilateral carotid artery stenosis 01/05/2025    CAD S/P percutaneous coronary angioplasty 2003    Stent mid LAD July 15, 2003    Carotid artery disease     Coronary artery disease involving native coronary artery of native  heart without angina pectoris 01/05/2025    Diastolic dysfunction     Essential (primary) hypertension     Resistent    Graves disease     hyperthroidism    Hyperlipidemia     Hypertensive heart disease     Other hyperlipidemia 01/05/2025    Presence of drug coated stent in LAD coronary artery 01/05/2025    Primary hypertension 01/05/2025         Past Surgical History:   Procedure Laterality Date    ANGIOGRAPHY OF INTERNAL MAMMARY VESSEL Left 1/6/2025    Procedure: Angiogram Internal Mammary;  Surgeon: Erika Gomes MD;  Location: [facility] CATH LAB;  Service: Cardiology;  Laterality: Left;    ARTERIOGRAPHY OF SUBCLAVIAN ARTERY Left 1/6/2025    Procedure: ARTERIOGRAM, SUBCLAVIAN;  Surgeon: Erika Gomes MD;  Location: [facility] CATH LAB;  Service: Cardiology;  Laterality: Left;    LEFT HEART CATHETERIZATION Left 1/6/2025    Procedure: Left heart cath;  Surgeon: Erika Gomes MD;  Location: [facility] CATH LAB;  Service: Cardiology;  Laterality: Left;    ptca with Stent mid LAD in 2003 N/A        Time Tracking:     OT Date of Treatment: 01/08/25  OT Start Time: 1100  OT Stop Time: 1140  OT Total Time (min): 40 min    Billable Minutes:Evaluation 15  Therapeutic Activity 25    ANTWON Nesbitt  1/8/2025

## 2025-01-08 NOTE — PROGRESS NOTES
Patient arrived to ICU bed 2 from OR at this time. Pt intubated, remains under anesthesia at this time. Heart monitor in place- 100% v-paced, VSS taken; see flowsheets. Precedex, levo gtts infusing. CTs WNL. Full HTT assessment as charted. ABGs, bloodwork drawn. MD at bs.  Pt family updated at bs.

## 2025-01-08 NOTE — PROGRESS NOTES
Pharmacist Renal Dose Adjustment Note    Leonidas Bautista is a 74 y.o. male being treated with the medication cefazolin.    Patient Data:    Vital Signs (Most Recent):  Temp: 98.4 °F (36.9 °C) (01/08/25 0516)  Pulse: 63 (01/08/25 0516)  Resp: (!) 21 (01/08/25 0516)  BP: 112/63 (01/08/25 0500)  SpO2: 98 % (01/08/25 0516) Vital Signs (72h Range):  Temp:  [97.4 °F (36.3 °C)-98.7 °F (37.1 °C)]   Pulse:  [52-82]   Resp:  [16-29]   BP: ()/()   SpO2:  [92 %-100 %]   Arterial Line BP: ()/(54-76)      Recent Labs   Lab 01/07/25  0859 01/07/25  1910 01/08/25  0347   CREATININE 1.6* 1.7* 2.3*     Serum creatinine: 2.3 mg/dL (H) 01/08/25 0347  Estimated creatinine clearance: 29.1 mL/min (A)    Cefazolin 2 g IV every 8 hours will be changed to cefazolin 2 g IV every 12 hours for surgical prophylaxis (one dose remaining) with CrCl 10-30 ml/min.    Pharmacist's Name: Jose Lynn  Pharmacist's Extension: [phone]

## 2025-01-08 NOTE — SUBJECTIVE & OBJECTIVE
Interval History: Extubated early this AM.  Stable on NC currently.  Remains on Epi gtt.  Alert and conversant.  Complains of chest discomfort making it difficult to get a deep breath.      Objective:     Vital Signs (Most Recent):  Temp: 99 °F (37.2 °C) (01/08/25 0700)  Pulse: 70 (01/08/25 0726)  Resp: 18 (01/08/25 0726)  BP: (!) 75/48 (01/08/25 0700)  SpO2: 97 % (01/08/25 0726) Vital Signs (24h Range):  Temp:  [97.5 °F (36.4 °C)-99 °F (37.2 °C)] 99 °F (37.2 °C)  Pulse:  [52-80] 70  Resp:  [16-34] 18  SpO2:  [94 %-100 %] 97 %  BP: ()/(48-93) 75/48  Arterial Line BP: ()/(52-76) 85/52     Weight: 82.1 kg (181 lb)  Body mass index is 25.97 kg/m².      Intake/Output Summary (Last 24 hours) at 1/8/2025 0749  Last data filed at 1/8/2025 0622  Gross per 24 hour   Intake 5387.39 ml   Output 1662 ml   Net 3725.39 ml        Physical Exam  Vitals and nursing note reviewed.   Constitutional:       General: He is not in acute distress.  Cardiovascular:      Rate and Rhythm: Normal rate and regular rhythm.      Pulses: Normal pulses.      Heart sounds: No murmur heard.     Comments: midline sternotomy is clean and dressed   Pulmonary:      Effort: Pulmonary effort is normal. No respiratory distress.      Breath sounds: No wheezing, rhonchi or rales.      Comments: Chest tubes in place  Abdominal:      General: Abdomen is flat. There is no distension.      Palpations: Abdomen is soft.      Tenderness: There is no abdominal tenderness.   Musculoskeletal:      Right lower leg: No edema.      Left lower leg: No edema.   Neurological:      General: No focal deficit present.      Mental Status: He is alert. Mental status is at baseline.           Review of Systems    Vents:  Vent Mode: Spont (01/08/25 0540)  Ventilator Initiated: Yes (01/07/25 1902)  Set Rate: 24 BPM (01/08/25 0227)  Vt Set: 470 mL (01/08/25 0227)  Pressure Support: 5 cmH20 (01/08/25 0540)  PEEP/CPAP: 5 cmH20 (01/08/25 0540)  Oxygen Concentration (%): 28  (01/08/25 0727)  Peak Airway Pressure: 10 cmH20 (01/08/25 0540)  Plateau Pressure: 0 cmH20 (01/08/25 0540)  Total Ve: 9.14 L/m (01/08/25 0540)  Negative Inspiratory Force (cm H2O): 0 (01/08/25 0540)  F/VT Ratio<105 (RSBI): (!) 25.82 (01/08/25 0530)    Lines/Drains/Airways       Central Venous Catheter Line  Duration             Percutaneous Central Line - Triple Lumen  01/07/25 1351 Internal Jugular Right <1 day              Drain  Duration                  Closed/Suction Drain 01/07/25 1511 Tube - 1 Right Leg Bulb 19 Fr. <1 day         Closed/Suction Drain 01/07/25 1512 Tube - 2 Right Leg Bulb 19 Fr. <1 day         Urethral Catheter 01/07/25 1350 Double-lumen;Silicone;Non-latex;Straight-tip;Temperature probe 16 Fr. <1 day         Y Chest Tube 1 and 2 01/07/25 1641 1 Right Mediastinal 24 Fr. 2 Left Mediastinal 24 Fr. <1 day         Y Chest Tube 3 and 4 01/07/25 1642 3 Left Pleural 24 Fr. Right Pleural 24 Fr. <1 day              Arterial Line  Duration             Arterial Line 01/07/25 1349 Right Radial <1 day              Line  Duration                  Pacer Wires 01/07/25 1915 <1 day              Peripheral Intravenous Line  Duration                  Peripheral IV - Single Lumen 01/06/25 0923 20 G Anterior;Right Forearm 1 day                    Significant Labs:    CBC/Anemia Profile:  Recent Labs   Lab 01/07/25  0859 01/07/25 1910 01/07/25 1913 01/08/25 0347   WBC 7.11 21.94*  --  18.34*   HGB 13.3* 10.5*  --  9.5*   HCT 41.0 31.7* 28* 29.1*    147*  --  150   * 104*  --  105*   RDW 13.6 13.9  --  13.9        Chemistries:  Recent Labs   Lab 01/07/25  0859 01/07/25 1910 01/08/25 0347    139 140   K 3.9 5.2* 3.6    109 108   CO2 24 22* 18*   BUN 18 20 26*   CREATININE 1.6* 1.7* 2.3*   CALCIUM 9.6 9.8 9.4   ALBUMIN 3.4* 2.4* 3.5   PROT 6.0 4.0* 5.0*   BILITOT 0.6 0.4 0.5   ALKPHOS 92 61 61   ALT 20 17 15   AST 28 43* 32   MG  --  3.3* 2.8*       All pertinent labs within the past 24  hours have been reviewed.    Significant Imaging:  I have reviewed all pertinent imaging results/findings within the past 24 hours.

## 2025-01-08 NOTE — ANESTHESIA POSTPROCEDURE EVALUATION
Anesthesia Post Evaluation    Patient: Leonidas Bautista    Procedure(s) Performed: Procedure(s) (LRB):  CORONARY ARTERY BYPASS GRAFT (CABG) (N/A)  SURGICAL PROCUREMENT, VEIN, ENDOSCOPIC (Right)  ECHOCARDIOGRAM,TRANSESOPHAGEAL (N/A)  BLOCK, NERVE, INTERCOSTAL, 2 OR MORE (N/A)    Final Anesthesia Type: general      Patient location during evaluation: PACU  Patient participation: Yes- Able to Participate  Level of consciousness: awake and alert, oriented and awake  Post-procedure vital signs: reviewed and stable  Pain management: adequate  Airway patency: patent    PONV status at discharge: No PONV  Anesthetic complications: no      Cardiovascular status: blood pressure returned to baseline  Respiratory status: unassisted  Hydration status: euvolemic  Follow-up not needed.              Vitals Value Taken Time   /61 01/08/25 0802   Temp 36.9 °C (98.42 °F) 01/08/25 0949   Pulse 76 01/08/25 0949   Resp 29 01/08/25 0949   SpO2 93 % 01/08/25 0949   Vitals shown include unfiled device data.      No case tracking events are documented in the log.      Pain/Mohini Score: Pain Rating Prior to Med Admin: 8 (1/8/2025  8:20 AM)  Pain Rating Post Med Admin: 0 (1/8/2025  6:37 AM)

## 2025-01-08 NOTE — PROGRESS NOTES
75 yo male smoker w/ PMHX COPD, HTN, DM, Unstable angina, CAD, Obesity, CKD is now s/p 4 x vessel CABG.   Pt is still intubated and is moved to the ICU for further observation overnight.     SAT and SBT in am.   CXR is pending.   On mechanical ventilation and is sedated.     Nicotine patch and vasopressors noted.     Plan as per bedside team.   Please call us for further assistance.     NPO  Sugar management.   F/up UOP.

## 2025-01-08 NOTE — ASSESSMENT & PLAN NOTE
Post CTS  VAP prophylaxis  Abg reviewed  Plan wean to extubate per protocol once awake post anesthesia

## 2025-01-08 NOTE — PROGRESS NOTES
Pharmacist Renal Dose Adjustment Note    Leonidas Bautista is a 74 y.o. male being treated with the medication famotidine.    Patient Data:    Vital Signs (Most Recent):  Temp: 97.5 °F (36.4 °C) (01/07/25 2200)  Pulse: (!) 58 (01/07/25 2200)  Resp: (!) 24 (01/07/25 2200)  BP: 126/63 (01/07/25 2200)  SpO2: 100 % (01/07/25 2200) Vital Signs (72h Range):  Temp:  [97.4 °F (36.3 °C)-98.7 °F (37.1 °C)]   Pulse:  [52-82]   Resp:  [16-26]   BP: ()/()   SpO2:  [92 %-100 %]   Arterial Line BP: (115-126)/(64-73)      Recent Labs   Lab 01/07/25  0526 01/07/25  0859 01/07/25 1910   CREATININE 1.6* 1.6* 1.7*     Serum creatinine: 1.7 mg/dL (H) 01/07/25 1910  Estimated creatinine clearance: 39.4 mL/min (A)    Famotidine 20 mg PO twice daily will be changed to famotidine 20 mg PO once daily for CrCl 30 - 59 ml/min.    Pharmacist's Name: Jose Lynn  Pharmacist's Extension: [phone]

## 2025-01-08 NOTE — PLAN OF CARE
OT evaluation completed   Bed mobility min A  Seated scoot CGA  Sit to stand min A  Stand pivot transfer with HHA min A  Slightly anxious  Cues for calming techniques and pursed lip breathing  Educated on sternal precautions and provided with handout    Recommend low intensity therapy with 24 hr SPV

## 2025-01-08 NOTE — ASSESSMENT & PLAN NOTE
No documented/reported history; based of pre admit labs with no prior available for review  Creatine stable for now.   BMP reviewed- noted Estimated Creatinine Clearance: 41.8 mL/min (A) (based on SCr of 1.6 mg/dL (H)). according to latest data. Based on current GFR, CKD stage is stage 3 - GFR 30-59.   Monitor UOP and serial BMP and adjust therapy as needed.   Renally dose meds. Avoid nephrotoxic medications and procedures.

## 2025-01-08 NOTE — HPI
74 year old male with known medical issues including HTN; HLD; hyperthyroid; diastolic dysfunction; tobacco smoking; and CAD s/p stent to LAD in 2003.   Pre admit labs show CKD3b (Cr 1.9, GFR 36) no prior labs available.    Presented to [facility] on 1/6/25 for LHC after palpitation with NSVT on holter and abnormal cardiolite with significant defect in LAD distribution

## 2025-01-08 NOTE — ASSESSMENT & PLAN NOTE
Unclear pack year history or willingness to stop  Address with education and recommendation for cessation post extubation

## 2025-01-08 NOTE — CONSULTS
[facility] - [facility] (Hospital)  Adult Nutrition  Consult Note    SUMMARY     Recommendations    Recommendation/Intervention:   1. Recommend pt continues on a Cardiac diet   2. If PO intake 50% or < x 2 days, recommend Suplena BID to assist filling nutritional gaps   3. Recommend pt continues to receive 500 mg BID vitamin C supplement for wound healing, per MD/NP   4. Encourage PO intake, recommend feeding assistance if warranted   5. Weekly weight    Goals:   1. Pt will tolerate and consume > 75% EEN and EPN prior to RD follow up   2. Pt will continue to receive vitamin C supplement prior to RD follow up  Nutrition Goal Status: new  Communication of RD Recs: other (comment) (POC, sticky note)    Assessment and Plan    Nutrition Problem  Increased nutrient needs (energy, protein, vitamin C)    Related to (etiology):   Increased demand for nutrition     Signs and Symptoms (as evidenced by):   Surgical wound healing needs, Estimated intake of food less than estimated needs     Interventions/Recommendations (treatment strategy):  1. Commercial beverage medical food supplement therapy  2. Vitamin C supplement therapy  3. Feeding assistance management  4. Collaboration by nutrition professional with other providers    Nutrition Diagnosis Status:   New       Malnutrition Assessment     Skin (Micronutrient): wounds unhealed, edema (De score = 10 (high risk)                                 Reason for Assessment    Reason For Assessment: consult (Post Op)  Diagnosis: surgery/postoperative complications (S/P CABG x 4)  General Information Comments:   1/8/25: 74 y.o. Male admitted for S/P CABG x 4. PMH: Abnormal nuclear stress test, CAD, HTN, HLD, Bilateral carotid artery stenosis, Stent in LAD coronary artery, Multiple vessel CAD, Current tobacco smoker, CKD 3b, Graves disease, On mechanically assisted ventilation. Pt is currently on a Cardiac diet, recently advanced from NPO, in the ICU. RD consulted for Post op.  "EMR noted pt underwent CABG yesterday (1/7), pt c/o of chest discomfort and difficulty taking deep breath, note chest tubes still in place. Discussed pt during rounds, RN noted pt extubated this am. Visited pt at bedside, pt sitting up in chair, eating lunch. Pt reported that he had a good appetite PTA and does not have a big appetite currently, stated "I know I need to eat so making myself", pt reported he does not drink ONS at home. Provided and discussed nutrition handout "Heart Healthy Nutriton Therapy" per the NCM, pt stated he cooks all his own meals, expressed understanding and appreciation for nutrition education provided, encouraged pt to read handout and use RD contact information with any further questions/concerns. Pt denied N/V/D, abd pain or difficulties chewing/swallowing, reproted his UBW is 175 lbs, visual NFPE performed, pt appeared nourished. Reviewed chart: LBM 1/6, 1+ trace generalized edema. Labs, meds, weights reviewed. Weight charted 1/8/25 181 lbs (BMI 25.97, Normal for age), no current previous weights charted. RD will continue to follow and monitor pt's nutrtional status during admit.    Nutrition Discharge Planning: Cardiac diet + 500 mg/day vitamin C + Suplena as warranted    Nutrition Risk Screen       Nutrition Related Social Determinants of Health: SDOH: Adequate food in home environment and None Identified    Nutrition/Diet History    Nutrition Intake History: 2 meals/day  Spiritual, Cultural Beliefs, Sikh Practices, Values that Affect Care: no  Food Allergies: NKFA  Factors Affecting Nutritional Intake: decreased appetite, pain    Anthropometrics    Temp: 98.1 °F (36.7 °C)  Height Method: Stated  Height: 5' 10" (177.8 cm)  Height (inches): 70 in  Weight Method: Bed Scale  Weight: 82.1 kg (181 lb)  Weight (lb): 181 lb  Ideal Body Weight (IBW), Male: 166 lb  % Ideal Body Weight, Male (lb): 109.04 %  BMI (Calculated): 26  BMI Grade: 25 - 29.9 - overweight (Normal for age)       Wt " Readings from Last 15 Encounters:   01/08/25 82.1 kg (181 lb)     Lab/Procedures/Meds    Pertinent Labs Reviewed: reviewed  Pertinent Medications Reviewed: reviewed  Pertinent Medications Comments: 500 mg BID vitamin C    BMP  Lab Results   Component Value Date     01/08/2025    K 3.6 01/08/2025     01/08/2025    CO2 18 (L) 01/08/2025    BUN 26 (H) 01/08/2025    CREATININE 2.3 (H) 01/08/2025    CALCIUM 9.4 01/08/2025    ANIONGAP 14 01/08/2025    EGFRNORACEVR 29 (A) 01/08/2025     Lab Results   Component Value Date    CALCIUM 9.4 01/08/2025     Lab Results   Component Value Date    ALBUMIN 3.5 01/08/2025     Lab Results   Component Value Date    ALT 15 01/08/2025    AST 32 01/08/2025    ALKPHOS 61 01/08/2025    BILITOT 0.5 01/08/2025     Recent Labs   Lab 01/08/25  1202   POCTGLUCOSE 104     Lab Results   Component Value Date    HGBA1C 5.1 01/07/2025     Lab Results   Component Value Date    WBC 18.34 (H) 01/08/2025    HGB 9.5 (L) 01/08/2025    HCT 29.1 (L) 01/08/2025     (H) 01/08/2025     01/08/2025       Scheduled Meds:   albuterol-ipratropium  3 mL Nebulization Q4H    ascorbic acid (vitamin C)  500 mg Oral BID    aspirin  81 mg Oral Daily    chlorhexidine  10 mL Mouth/Throat BID    clopidogreL  75 mg Oral Daily    [START ON 1/9/2025] cyanocobalamin  1,000 mcg Oral Daily    docusate sodium  100 mg Oral BID    famotidine (PF)  20 mg Intravenous Daily    [START ON 1/9/2025] folic acid  1 mg Oral Daily    hydrALAZINE  25 mg Oral TID    isosorbide mononitrate  30 mg Oral Daily    losartan  100 mg Oral Daily    magnesium hydroxide 400 mg/5 ml  30 mL Oral Daily    methIMAzole  10 mg Oral Daily    metoprolol tartrate  25 mg Oral BID    NIFEdipine  60 mg Oral Daily    polyethylene glycol  17 g Oral Daily     Continuous Infusions:   dexmedeTOMIDine (Precedex) infusion (titrating)  0-1.4 mcg/kg/hr Intravenous Continuous   Stopped at 01/08/25 8451    dextrose 5% lactated ringers   Intravenous  Continuous   Stopped at 01/07/25 2227    EPINEPHrine  0-0.5 mcg/kg/min Intravenous Continuous 34.3 mL/hr at 01/08/25 1432 0.14 mcg/kg/min at 01/08/25 1432    insulin regular 1 units/mL infusion orderable (CTS POST-OP)  0-52 Units/hr Intravenous Continuous PRN   Stopped at 01/08/25 1203    nicardipine  0-15 mg/hr Intravenous Continuous PRN   Stopped at 01/08/25 0334    NORepinephrine bitartrate-D5W  0-3 mcg/kg/min Intravenous Continuous   Stopped at 01/07/25 2039    vasopressin  0.04 Units/min Intravenous Continuous         PRN Meds:.  Current Facility-Administered Medications:     acetaminophen, 650 mg, Oral, Q6H PRN    albumin human 5%, 25 g, Intravenous, PRN    calcium gluconate IVPB, 1 g, Intravenous, PRN    calcium gluconate IVPB, 2 g, Intravenous, PRN    calcium gluconate IVPB, 3 g, Intravenous, PRN    dextrose 50%, 12.5 g, Intravenous, PRN    dextrose 50%, 25 g, Intravenous, PRN    fentaNYL, 25 mcg, Intravenous, Q1H PRN    fentaNYL, 50 mcg, Intravenous, Q1H PRN    influenza, 0.5 mL, Intramuscular, vaccine x 1 dose    insulin regular 1 units/mL infusion orderable (CTS POST-OP), 0-52 Units/hr, Intravenous, Continuous PRN    lactated ringers, 1,000 mL, Intravenous, PRN    magnesium sulfate IVPB, 4 g, Intravenous, PRN    metoclopramide, 5 mg, Intravenous, Q6H PRN    midazolam, 1 mg, Intravenous, Q1H PRN    nicardipine, 0-15 mg/hr, Intravenous, Continuous PRN    nitroGLYCERIN 0.4 MG/DOSE TL SPRY, 1 spray, Sublingual, Q5 Min PRN    ondansetron, 8 mg, Oral, Q8H PRN    ondansetron, 4 mg, Intravenous, Q12H PRN    oxyCODONE, 10 mg, Oral, Q4H PRN    oxyCODONE, 5 mg, Oral, Q4H PRN    pneumoc 20-kristen conj-dip cr(PF), 0.5 mL, Intramuscular, vaccine x 1 dose    sodium chloride 0.9%, 10 mL, Intravenous, PRN      Physical Findings/Assessment         Estimated/Assessed Needs    Weight Used For Calorie Calculations: 82.1 kg (181 lb)  Energy Calorie Requirements (kcal): 5813-4298 kcals (MSJ x 1.2-1.4 AF (CABG)  Energy Need Method:  [facility]  Protein Requirements: 45-49 g (0.55-0.6 g/kg ABW (CKD, non dialysis)  Weight Used For Protein Calculations: 82.1 kg (181 lb)  Fluid Requirements (mL): 1907-8251 mL (1 mL/kcal)  Estimated Fluid Requirement Method: RDA Method  RDA Method (mL): 1880  CHO Requirement: 235-267 g (1201-4187 kcals/8)      Nutrition Prescription Ordered    Current Diet Order: Cardiac diet    Evaluation of Received Nutrient/Fluid Intake  I/O: (Net since admit):  1/8/25: +3231.6 mL    Energy Calories Required: not meeting needs  Protein Required: not meeting needs  Fluid Required: exceeds needs  Total Fluid Intake (mL): 5491.6  Tolerance: tolerating  % Intake of Estimated Energy Needs: 25 - 50 %  % Meal Intake: 25 - 50 %    Nutrition Risk    Level of Risk/Frequency of Follow-up: high (F/u x 2 weekly)       Monitor and Evaluation    Food and Nutrient Intake: energy intake, food and beverage intake  Food and Nutrient Adminstration: diet order  Knowledge/Beliefs/Attitudes: food and nutrition knowledge/skill, beliefs and attitudes  Anthropometric Measurements: weight, weight change, body mass index  Biochemical Data, Medical Tests and Procedures: electrolyte and renal panel, inflammatory profile  Nutrition-Focused Physical Findings: overall appearance       Nutrition Follow-Up    RD Follow-up?: Yes  Thais Hidalgo, BS, RDN, LDN

## 2025-01-08 NOTE — PROGRESS NOTES
[facility] ([facility])  Critical Care Medicine  Progress Note    Patient Name: Leonidas Bautista  MRN: [id number]  Admission Date: 1/6/2025  Hospital Length of Stay: 0 days  Code Status: Full Code  Attending Provider: Sidra Guardado MD  Primary Care Provider: No primary care provider on file.   Principal Problem: S/P CABG x 4    Subjective:     HPI:  74 year old male with known medical issues including HTN; HLD; hyperthyroid; diastolic dysfunction; tobacco smoking; and CAD s/p stent to LAD in 2003.   Pre admit labs show CKD3b (Cr 1.9, GFR 36) no prior labs available.    Presented to [facility] on 1/6/25 for LHC after palpitation with NSVT on holter and abnormal cardiolite with significant defect in LAD distribution    Hospital/ICU Course:  1/6 - LHC revealed 3v CAD with nl lvf; CT surgery consulted  1/7 - underwent CABG x 4. Transferred to ICU post op. Remains sedated, paralyzed on mech vent via OETT at arrival on lidocaine, levophed, and precedex infusions  1/8 - extubated early morning.  Stable on NC currently.  Remains on Epi gtt.  Alert and conversant.     Interval History: Extubated early this AM.  Stable on NC currently.  Remains on Epi gtt.  Alert and conversant.  Complains of chest discomfort making it difficult to get a deep breath.      Objective:     Vital Signs (Most Recent):  Temp: 99 °F (37.2 °C) (01/08/25 0700)  Pulse: 70 (01/08/25 0726)  Resp: 18 (01/08/25 0726)  BP: (!) 75/48 (01/08/25 0700)  SpO2: 97 % (01/08/25 0726) Vital Signs (24h Range):  Temp:  [97.5 °F (36.4 °C)-99 °F (37.2 °C)] 99 °F (37.2 °C)  Pulse:  [52-80] 70  Resp:  [16-34] 18  SpO2:  [94 %-100 %] 97 %  BP: ()/(48-93) 75/48  Arterial Line BP: ()/(52-76) 85/52     Weight: 82.1 kg (181 lb)  Body mass index is 25.97 kg/m².      Intake/Output Summary (Last 24 hours) at 1/8/2025 0749  Last data filed at 1/8/2025 0622  Gross per 24 hour   Intake 5387.39 ml   Output 1662 ml   Net 3725.39 ml        Physical Exam  Vitals and  nursing note reviewed.   Constitutional:       General: He is not in acute distress.  Cardiovascular:      Rate and Rhythm: Normal rate and regular rhythm.      Pulses: Normal pulses.      Heart sounds: No murmur heard.     Comments: midline sternotomy is clean and dressed   Pulmonary:      Effort: Pulmonary effort is normal. No respiratory distress.      Breath sounds: No wheezing, rhonchi or rales.      Comments: Chest tubes in place  Abdominal:      General: Abdomen is flat. There is no distension.      Palpations: Abdomen is soft.      Tenderness: There is no abdominal tenderness.   Musculoskeletal:      Right lower leg: No edema.      Left lower leg: No edema.   Neurological:      General: No focal deficit present.      Mental Status: He is alert. Mental status is at baseline.           Review of Systems    Vents:  Vent Mode: Spont (01/08/25 0540)  Ventilator Initiated: Yes (01/07/25 1902)  Set Rate: 24 BPM (01/08/25 0227)  Vt Set: 470 mL (01/08/25 0227)  Pressure Support: 5 cmH20 (01/08/25 0540)  PEEP/CPAP: 5 cmH20 (01/08/25 0540)  Oxygen Concentration (%): 28 (01/08/25 0727)  Peak Airway Pressure: 10 cmH20 (01/08/25 0540)  Plateau Pressure: 0 cmH20 (01/08/25 0540)  Total Ve: 9.14 L/m (01/08/25 0540)  Negative Inspiratory Force (cm H2O): 0 (01/08/25 0540)  F/VT Ratio<105 (RSBI): (!) 25.82 (01/08/25 0530)    Lines/Drains/Airways       Central Venous Catheter Line  Duration             Percutaneous Central Line - Triple Lumen  01/07/25 1351 Internal Jugular Right <1 day              Drain  Duration                  Closed/Suction Drain 01/07/25 1511 Tube - 1 Right Leg Bulb 19 Fr. <1 day         Closed/Suction Drain 01/07/25 1512 Tube - 2 Right Leg Bulb 19 Fr. <1 day         Urethral Catheter 01/07/25 1350 Double-lumen;Silicone;Non-latex;Straight-tip;Temperature probe 16 Fr. <1 day         Y Chest Tube 1 and 2 01/07/25 1641 1 Right Mediastinal 24 Fr. 2 Left Mediastinal 24 Fr. <1 day         Y Chest Tube 3 and 4  01/07/25 1642 3 Left Pleural 24 Fr. Right Pleural 24 Fr. <1 day              Arterial Line  Duration             Arterial Line 01/07/25 1349 Right Radial <1 day              Line  Duration                  Pacer Wires 01/07/25 1915 <1 day              Peripheral Intravenous Line  Duration                  Peripheral IV - Single Lumen 01/06/25 0923 20 G Anterior;Right Forearm 1 day                    Significant Labs:    CBC/Anemia Profile:  Recent Labs   Lab 01/07/25  0859 01/07/25 1910 01/07/25 1913 01/08/25  0347   WBC 7.11 21.94*  --  18.34*   HGB 13.3* 10.5*  --  9.5*   HCT 41.0 31.7* 28* 29.1*    147*  --  150   * 104*  --  105*   RDW 13.6 13.9  --  13.9        Chemistries:  Recent Labs   Lab 01/07/25  0859 01/07/25 1910 01/08/25  0347    139 140   K 3.9 5.2* 3.6    109 108   CO2 24 22* 18*   BUN 18 20 26*   CREATININE 1.6* 1.7* 2.3*   CALCIUM 9.6 9.8 9.4   ALBUMIN 3.4* 2.4* 3.5   PROT 6.0 4.0* 5.0*   BILITOT 0.6 0.4 0.5   ALKPHOS 92 61 61   ALT 20 17 15   AST 28 43* 32   MG  --  3.3* 2.8*       All pertinent labs within the past 24 hours have been reviewed.    Significant Imaging:  I have reviewed all pertinent imaging results/findings within the past 24 hours.    ABG  Recent Labs   Lab 01/08/25  0345   PH 7.317*   PO2 90   PCO2 39.9   HCO3 20.4*   BE -6*     Assessment/Plan:     Pulmonary  On mechanically assisted ventilation  Post CTS  VAP prophylaxis  Abg reviewed  Plan wean to extubate per protocol once awake post anesthesia    1/8 - extubated early this morning.  Stable on NC currently.    Cardiac/Vascular  * S/P CABG x 4  CTS managing  ASA, plavix, metoprolol as able    Other hyperlipidemia  Statin, as able      Primary hypertension  Now post on support  Monitor and resume antihypertensives as indicated    Coronary artery disease involving native coronary artery of native heart without angina pectoris  Patient with known CAD s/p CABG, which is uncontrolled Will continue ASA,  Plavix, and Statin and monitor for S/Sx of angina/ACS. Continue to monitor on telemetry.     - now s/p CABG x 4 on 1/7 with Dr Guardado    Renal/  Stage 3b chronic kidney disease  No documented/reported history; based of pre admit labs with no prior available for review  Creatine stable for now.   BMP reviewed- noted Estimated Creatinine Clearance: 29.1 mL/min (A) (based on SCr of 2.3 mg/dL (H)). according to latest data. Based on current GFR, CKD stage is stage 3 - GFR 30-59.   Monitor UOP and serial BMP and adjust therapy as needed.   Renally dose meds. Avoid nephrotoxic medications and procedures.    Endocrine  Graves' disease  TFTs wnl  Continue outpatient dose Tapazole    Other  Currently smokes tobacco  Unclear pack year history or willingness to stop  Address with education and recommendation for cessation when appropriate       Rudy Bernal MD  Critical Care Medicine  [facility] - Intensive Care ([facility])

## 2025-01-09 PROBLEM — I48.0 PAROXYSMAL ATRIAL FIBRILLATION: Status: ACTIVE | Noted: 2025-01-09

## 2025-01-09 LAB
ALBUMIN SERPL BCP-MCNC: 3.3 G/DL (ref 3.5–5.2)
ALP SERPL-CCNC: 55 U/L (ref 40–150)
ALT SERPL W/O P-5'-P-CCNC: 11 U/L (ref 10–44)
ANION GAP SERPL CALC-SCNC: 6 MMOL/L (ref 8–16)
AST SERPL-CCNC: 45 U/L (ref 10–40)
BILIRUB SERPL-MCNC: 0.4 MG/DL (ref 0.1–1)
BLD PROD TYP BPU: NORMAL
BLD PROD TYP BPU: NORMAL
BLOOD UNIT EXPIRATION DATE: NORMAL
BLOOD UNIT EXPIRATION DATE: NORMAL
BLOOD UNIT TYPE CODE: 5100
BLOOD UNIT TYPE CODE: 5100
BLOOD UNIT TYPE: NORMAL
BLOOD UNIT TYPE: NORMAL
BUN SERPL-MCNC: 36 MG/DL (ref 8–23)
CALCIUM SERPL-MCNC: 9.6 MG/DL (ref 8.7–10.5)
CHLORIDE SERPL-SCNC: 107 MMOL/L (ref 95–110)
CO2 SERPL-SCNC: 26 MMOL/L (ref 23–29)
CODING SYSTEM: NORMAL
CODING SYSTEM: NORMAL
CREAT SERPL-MCNC: 2.2 MG/DL (ref 0.5–1.4)
CROSSMATCH INTERPRETATION: NORMAL
CROSSMATCH INTERPRETATION: NORMAL
DISPENSE STATUS: NORMAL
DISPENSE STATUS: NORMAL
ERYTHROCYTE [DISTWIDTH] IN BLOOD BY AUTOMATED COUNT: 14.2 % (ref 11.5–14.5)
EST. GFR  (NO RACE VARIABLE): 31 ML/MIN/1.73 M^2
GLUCOSE SERPL-MCNC: 113 MG/DL (ref 70–110)
HCT VFR BLD AUTO: 27 % (ref 40–54)
HGB BLD-MCNC: 8.9 G/DL (ref 14–18)
MAGNESIUM SERPL-MCNC: 2.4 MG/DL (ref 1.6–2.6)
MAGNESIUM SERPL-MCNC: 3.7 MG/DL (ref 1.6–2.6)
MCH RBC QN AUTO: 34.2 PG (ref 27–31)
MCHC RBC AUTO-ENTMCNC: 33 G/DL (ref 32–36)
MCV RBC AUTO: 104 FL (ref 82–98)
NUM UNITS TRANS PACKED RBC: NORMAL
NUM UNITS TRANS PACKED RBC: NORMAL
OHS QRS DURATION: 116 MS
OHS QTC CALCULATION: 423 MS
PLATELET # BLD AUTO: 128 K/UL (ref 150–450)
PMV BLD AUTO: 10.7 FL (ref 9.2–12.9)
POCT GLUCOSE: 120 MG/DL (ref 70–110)
POCT GLUCOSE: 56 MG/DL (ref 70–110)
POCT GLUCOSE: 73 MG/DL (ref 70–110)
POCT GLUCOSE: 91 MG/DL (ref 70–110)
POTASSIUM SERPL-SCNC: 4.3 MMOL/L (ref 3.5–5.1)
PROT SERPL-MCNC: 4.8 G/DL (ref 6–8.4)
RBC # BLD AUTO: 2.6 M/UL (ref 4.6–6.2)
SODIUM SERPL-SCNC: 139 MMOL/L (ref 136–145)
WBC # BLD AUTO: 21.36 K/UL (ref 3.9–12.7)

## 2025-01-09 PROCEDURE — 94761 N-INVAS EAR/PLS OXIMETRY MLT: CPT

## 2025-01-09 PROCEDURE — 21400001 HC TELEMETRY ROOM

## 2025-01-09 PROCEDURE — 25000003 PHARM REV CODE 250: Performed by: THORACIC SURGERY (CARDIOTHORACIC VASCULAR SURGERY)

## 2025-01-09 PROCEDURE — 99233 SBSQ HOSP IP/OBS HIGH 50: CPT | Mod: ,,, | Performed by: INTERNAL MEDICINE

## 2025-01-09 PROCEDURE — 63600175 PHARM REV CODE 636 W HCPCS: Performed by: THORACIC SURGERY (CARDIOTHORACIC VASCULAR SURGERY)

## 2025-01-09 PROCEDURE — 83735 ASSAY OF MAGNESIUM: CPT | Mod: 91 | Performed by: THORACIC SURGERY (CARDIOTHORACIC VASCULAR SURGERY)

## 2025-01-09 PROCEDURE — 80053 COMPREHEN METABOLIC PANEL: CPT | Performed by: THORACIC SURGERY (CARDIOTHORACIC VASCULAR SURGERY)

## 2025-01-09 PROCEDURE — 93005 ELECTROCARDIOGRAM TRACING: CPT

## 2025-01-09 PROCEDURE — 93010 ELECTROCARDIOGRAM REPORT: CPT | Mod: ,,, | Performed by: INTERNAL MEDICINE

## 2025-01-09 PROCEDURE — 97530 THERAPEUTIC ACTIVITIES: CPT

## 2025-01-09 PROCEDURE — 36415 COLL VENOUS BLD VENIPUNCTURE: CPT | Performed by: THORACIC SURGERY (CARDIOTHORACIC VASCULAR SURGERY)

## 2025-01-09 PROCEDURE — 25000003 PHARM REV CODE 250: Performed by: INTERNAL MEDICINE

## 2025-01-09 PROCEDURE — 94799 UNLISTED PULMONARY SVC/PX: CPT

## 2025-01-09 PROCEDURE — 27000221 HC OXYGEN, UP TO 24 HOURS

## 2025-01-09 PROCEDURE — 99900035 HC TECH TIME PER 15 MIN (STAT)

## 2025-01-09 PROCEDURE — 85027 COMPLETE CBC AUTOMATED: CPT | Performed by: THORACIC SURGERY (CARDIOTHORACIC VASCULAR SURGERY)

## 2025-01-09 PROCEDURE — 97116 GAIT TRAINING THERAPY: CPT

## 2025-01-09 RX ORDER — ENOXAPARIN SODIUM 100 MG/ML
40 INJECTION SUBCUTANEOUS EVERY 24 HOURS
Status: DISCONTINUED | OUTPATIENT
Start: 2025-01-09 | End: 2025-01-10 | Stop reason: ALTCHOICE

## 2025-01-09 RX ORDER — FAMOTIDINE 20 MG/1
20 TABLET, FILM COATED ORAL 2 TIMES DAILY
Status: DISCONTINUED | OUTPATIENT
Start: 2025-01-09 | End: 2025-01-09

## 2025-01-09 RX ORDER — CHLORHEXIDINE GLUCONATE ORAL RINSE 1.2 MG/ML
10 SOLUTION DENTAL 2 TIMES DAILY
Status: DISCONTINUED | OUTPATIENT
Start: 2025-01-09 | End: 2025-01-11 | Stop reason: HOSPADM

## 2025-01-09 RX ORDER — MUPIROCIN 20 MG/G
OINTMENT TOPICAL 2 TIMES DAILY
Status: DISCONTINUED | OUTPATIENT
Start: 2025-01-09 | End: 2025-01-09 | Stop reason: SDUPTHER

## 2025-01-09 RX ORDER — ACETAMINOPHEN 325 MG/1
325 TABLET ORAL EVERY 4 HOURS PRN
Status: DISCONTINUED | OUTPATIENT
Start: 2025-01-09 | End: 2025-01-11 | Stop reason: HOSPADM

## 2025-01-09 RX ORDER — FAMOTIDINE 20 MG/1
20 TABLET, FILM COATED ORAL DAILY
Status: DISCONTINUED | OUTPATIENT
Start: 2025-01-10 | End: 2025-01-11 | Stop reason: HOSPADM

## 2025-01-09 RX ADMIN — CLOPIDOGREL BISULFATE 75 MG: 75 TABLET ORAL at 08:01

## 2025-01-09 RX ADMIN — FOLIC ACID 1 MG: 1 TABLET ORAL at 08:01

## 2025-01-09 RX ADMIN — CHLORHEXIDINE GLUCONATE 0.12% ORAL RINSE 10 ML: 1.2 LIQUID ORAL at 08:01

## 2025-01-09 RX ADMIN — METOPROLOL TARTRATE 25 MG: 25 TABLET, FILM COATED ORAL at 08:01

## 2025-01-09 RX ADMIN — DOCUSATE SODIUM 100 MG: 100 CAPSULE, LIQUID FILLED ORAL at 08:01

## 2025-01-09 RX ADMIN — HYDRALAZINE HYDROCHLORIDE 25 MG: 25 TABLET ORAL at 08:01

## 2025-01-09 RX ADMIN — ASPIRIN 81 MG: 81 TABLET, COATED ORAL at 08:01

## 2025-01-09 RX ADMIN — POLYETHYLENE GLYCOL 3350 17 G: 17 POWDER, FOR SOLUTION ORAL at 08:01

## 2025-01-09 RX ADMIN — CYANOCOBALAMIN TAB 1000 MCG 1000 MCG: 1000 TAB at 08:01

## 2025-01-09 RX ADMIN — FAMOTIDINE 20 MG: 10 INJECTION, SOLUTION INTRAVENOUS at 08:01

## 2025-01-09 RX ADMIN — MAGNESIUM HYDROXIDE 2400 MG: 400 SUSPENSION ORAL at 08:01

## 2025-01-09 RX ADMIN — OXYCODONE HYDROCHLORIDE AND ACETAMINOPHEN 500 MG: 500 TABLET ORAL at 08:01

## 2025-01-09 RX ADMIN — METHIMAZOLE 10 MG: 10 TABLET ORAL at 08:01

## 2025-01-09 RX ADMIN — MAGNESIUM SULFATE HEPTAHYDRATE 4 G: 40 INJECTION, SOLUTION INTRAVENOUS at 07:01

## 2025-01-09 NOTE — PROGRESS NOTES
Pharmacist Renal Dose Adjustment Note    Leonidas Bautista is a 74 y.o. male being treated with the medication famotidine.    Patient Data:    Vital Signs (Most Recent):  Temp: 99.7 °F (37.6 °C) (01/09/25 0915)  Pulse: 74 (01/09/25 0915)  Resp: (!) 34 (01/09/25 0915)  BP: (!) 79/41 (01/09/25 0915)  SpO2: (!) 85 % (01/09/25 0900) Vital Signs (72h Range):  Temp:  [93 °F (33.9 °C)-99.7 °F (37.6 °C)]   Pulse:  []   Resp:  [14-51]   BP: ()/()   SpO2:  [73 %-100 %]   Arterial Line BP: ()/(46-84)      Recent Labs   Lab 01/07/25  1910 01/08/25  0347 01/09/25  0447   CREATININE 1.7* 2.3* 2.2*     Serum creatinine: 2.2 mg/dL (H) 01/09/25 0447  Estimated creatinine clearance: 30.4 mL/min (A)    Medication: famotidine 20 mg PO BID will be changed to famotidine 20 mg PO daily per pharmacy renal dose adjustment protocol for patients with CrCl less than 50 mL/min.    Pharmacist's Name: Devora Spring PharmD  Pharmacist's Extension: [phone]     Thank you for allowing us to participate in this patient's care.     Devora Spring PharmD 01/09/2025 9:42 AM

## 2025-01-09 NOTE — ASSESSMENT & PLAN NOTE
No documented/reported history; based of pre admit labs with no prior available for review  Creatine stable for now.   BMP reviewed- noted Estimated Creatinine Clearance: 30.4 mL/min (A) (based on SCr of 2.2 mg/dL (H)). according to latest data. Based on current GFR, CKD stage is stage 3 - GFR 30-59.   Monitor UOP and serial BMP and adjust therapy as needed.   Renally dose meds. Avoid nephrotoxic medications and procedures.

## 2025-01-09 NOTE — PLAN OF CARE
Pt is AAOx4, VSS on 2L nasal cannula. A fib on heart monitor. Pacing wires, chest tubes, REBECCA drains all removed today without complication. Cardiac diet, pt is tolerating well. UOP 335mL for shift. Pt up to chair with assistance x1. POC reviewed with pt and daughter at bedside, both verbalize understanding. Bed/chair is locked in lowest position, side rails up x2, bed/chair alarm set, call light/personal items within reach, pt instructed to call staff for assistance, all alarms are audible and appropriate.   Temp:  [98.8 °F (37.1 °C)-99.9 °F (37.7 °C)]   Pulse:  [63-86]   Resp:  [22-38]   BP: ()/(41-97)   SpO2:  [73 %-98 %]      Problem: Adult Inpatient Plan of Care  Goal: Plan of Care Review  Outcome: Progressing  Goal: Patient-Specific Goal (Individualized)  Outcome: Progressing  Goal: Absence of Hospital-Acquired Illness or Injury  Outcome: Progressing  Goal: Optimal Comfort and Wellbeing  Outcome: Progressing  Goal: Readiness for Transition of Care  Outcome: Progressing     Problem: Infection  Goal: Absence of Infection Signs and Symptoms  Outcome: Progressing     Problem: Wound  Goal: Optimal Coping  Outcome: Progressing  Goal: Optimal Functional Ability  Outcome: Progressing  Goal: Absence of Infection Signs and Symptoms  Outcome: Progressing  Goal: Improved Oral Intake  Outcome: Progressing  Goal: Optimal Pain Control and Function  Outcome: Progressing  Goal: Skin Health and Integrity  Outcome: Progressing  Goal: Optimal Wound Healing  Outcome: Progressing     Problem: Skin Injury Risk Increased  Goal: Skin Health and Integrity  Outcome: Progressing     Problem: Fall Injury Risk  Goal: Absence of Fall and Fall-Related Injury  Outcome: Progressing     Problem: Cardiovascular Surgery  Goal: Improved Activity Tolerance  Outcome: Progressing  Goal: Optimal Coping with Heart Surgery  Outcome: Progressing  Goal: Absence of Bleeding  Outcome: Progressing  Goal: Effective Bowel Elimination  Outcome:  Progressing  Goal: Effective Cardiac Function  Outcome: Progressing  Goal: Optimal Cerebral Tissue Perfusion  Outcome: Progressing  Goal: Fluid and Electrolyte Balance  Outcome: Progressing  Goal: Blood Glucose Level Within Targeted Range  Outcome: Progressing  Goal: Absence of Infection Signs and Symptoms  Outcome: Progressing  Goal: Anesthesia/Sedation Recovery  Outcome: Progressing  Goal: Acceptable Pain Control  Outcome: Progressing  Goal: Nausea and Vomiting Relief  Outcome: Progressing  Goal: Effective Urinary Elimination  Outcome: Progressing  Goal: Effective Oxygenation and Ventilation  Outcome: Progressing

## 2025-01-09 NOTE — PROGRESS NOTES
[facility] ([facility])  Cardiology  Progress Note    Patient Name: Leonidas Bautista  MRN: [id number]  Admission Date: 1/6/2025  Hospital Length of Stay: 1 days  Code Status: Full Code   Attending Physician: Sidra Guardado MD   Primary Care Physician: No primary care provider on file.  Expected Discharge Date:   Principal Problem:S/P CABG x 4    Subjective:   HPI:  Patient is a 74 y.o. male presents with H/O CAD HTN HLP TOBACCO USE PALPITATION NSVT BY HOLTER HAD AN ABNORMAL CARDIOLITE .WITH SIGNIFICANT DEFECT IN LAD DISTRIBUTION. HE WAS REFERED FOR CATH DY DR GOOD     [facility] Course:   1/7/25-Patient seen and examined today, sitting up in bed, s/p LHC yesterday which showed multivessel CAD. CABG planned today. CP free during exam. Admits to being anxious. Labs stable.    1/8/25-Patient seen and examined today, s/p CABG x 4,  post-op day # 1. Feels ok. Weak/tired. Reports incisional pain. On low dose Epi. Labs reviewed. Creatinine 2.3. H/H 9.5/29.1.    1/9/25-Patient seen and examined today, s/p CABG x 4 POD # 2. BP dropped earlier today. Feels well at time of exam. Pain improved, chest tubes removed.Monitor shows ? Afib, EKG ordered.  Labs reviewed. Creatinine 2.2. H/H 8.9/27.0.         Review of Systems   Constitutional: Positive for malaise/fatigue.   HENT: Negative.     Eyes: Negative.    Cardiovascular:  Positive for chest pain (incisional).   Respiratory:  Positive for shortness of breath.    Endocrine: Negative.    Hematologic/Lymphatic: Bruises/bleeds easily.   Skin: Negative.    Musculoskeletal:  Positive for arthritis and joint pain.   Gastrointestinal: Negative.    Genitourinary: Negative.    Neurological: Negative.    Psychiatric/Behavioral: Negative.     Allergic/Immunologic: Negative.      Objective:     Vital Signs (Most Recent):  Temp: 99.3 °F (37.4 °C) (01/09/25 1245)  Pulse: 69 (01/09/25 1245)  Resp: (!) 29 (01/09/25 1245)  BP: (!) 107/57 (01/09/25 1200)  SpO2: (!) 85 % (01/09/25 0900)  Vital Signs (24h Range):  Temp:  [93 °F (33.9 °C)-99.9 °F (37.7 °C)] 99.3 °F (37.4 °C)  Pulse:  [] 69  Resp:  [14-44] 29  SpO2:  [73 %-98 %] 85 %  BP: ()/(41-97) 107/57  Arterial Line BP: ()/(55-84) 143/84     Weight: 82.1 kg (181 lb)  Body mass index is 25.97 kg/m².     SpO2: (!) 85 %         Intake/Output Summary (Last 24 hours) at 1/9/2025 1310  Last data filed at 1/9/2025 1000  Gross per 24 hour   Intake 585.42 ml   Output 1203 ml   Net -617.58 ml       Lines/Drains/Airways       Central Venous Catheter Line  Duration             Percutaneous Central Line - Triple Lumen  01/07/25 1351 Internal Jugular Right 1 day              Drain  Duration                  Urethral Catheter 01/07/25 1350 Double-lumen;Silicone;Non-latex;Straight-tip;Temperature probe 16 Fr. 1 day              Peripheral Intravenous Line  Duration                  Peripheral IV - Single Lumen 01/06/25 0923 20 G Anterior;Right Forearm 3 days                       Physical Exam  Vitals and nursing note reviewed.   Constitutional:       Appearance: Normal appearance.   HENT:      Head: Normocephalic and atraumatic.   Eyes:      Pupils: Pupils are equal, round, and reactive to light.   Cardiovascular:      Rate and Rhythm: Normal rate and regular rhythm.      Heart sounds: S1 normal and S2 normal. No murmur heard.     Comments: Sternotomy site C/D/I; dressing in place  Pulmonary:      Effort: Pulmonary effort is normal.      Comments: Diminished BS  Abdominal:      Palpations: Abdomen is soft.   Musculoskeletal:      Right lower leg: Edema present.      Left lower leg: Edema present.   Skin:     General: Skin is warm and dry.   Neurological:      General: No focal deficit present.      Mental Status: He is oriented to person, place, and time.   Psychiatric:         Mood and Affect: Mood normal.         Behavior: Behavior normal.         Thought Content: Thought content normal.            Significant Labs: CMP   Recent Labs   Lab  "01/07/25 1910 01/08/25 0347 01/09/25 0447    140 139   K 5.2* 3.6 4.3    108 107   CO2 22* 18* 26   * 280* 113*   BUN 20 26* 36*   CREATININE 1.7* 2.3* 2.2*   CALCIUM 9.8 9.4 9.6   PROT 4.0* 5.0* 4.8*   ALBUMIN 2.4* 3.5 3.3*   BILITOT 0.4 0.5 0.4   ALKPHOS 61 61 55   AST 43* 32 45*   ALT 17 15 11   ANIONGAP 8 14 6*   , CBC   Recent Labs   Lab 01/07/25 1910 01/07/25 1913 01/08/25 0347 01/09/25 0447   WBC 21.94*  --  18.34* 21.36*   HGB 10.5*  --  9.5* 8.9*   HCT 31.7*   < > 29.1* 27.0*   *  --  150 128*    < > = values in this interval not displayed.   , Troponin No results for input(s): "TROPONINIHS" in the last 48 hours., and All pertinent lab results from the last 24 hours have been reviewed.    Significant Imaging: Echocardiogram: Transthoracic echo (TTE) complete (Cupid Only): No results found for this or any previous visit. and EKG: Reviewed  Assessment and Plan:   Patient who presents with multivessel CAD, recovering well s/p CABG x 4. Continue current post-op care and mgmt as per CTS.    * S/P CABG x 4  -Recovering well post-CABG  -Wean pressor support as tolerated  -Continue ASA, Plavix, BB, CCB  -Needs statin on board  -IS usage and ambulation when able    1/9/25  -Stable CV wise  -Continue ASA, Plavix, BB, CCB  -Statin as tolerated  -IS usage and ambulation    Currently smokes tobacco  -Cessation advised    Other hyperlipidemia  -Needs statin on board      Primary hypertension  -Continue home meds  -Titrate medications    Coronary artery disease involving native coronary artery of native heart without angina pectoris  -See plan under abnormal stress test    Abnormal nuclear stress test  -s/p LHC which showed multivessel CAD  -CABG planned today  -Continue ASA, Imdur, ARB, Toprol, CCB    See plan under CABG        VTE Risk Mitigation (From admission, onward)           Ordered     Place sequential compression device  Until discontinued         01/07/25 1839     IP VTE HIGH " RISK PATIENT  Once         01/07/25 1839                    Esmer Haddad PA-C  Cardiology  [facility] ([facility])

## 2025-01-09 NOTE — ASSESSMENT & PLAN NOTE
-Recovering well post-CABG  -Wean pressor support as tolerated  -Continue ASA, Plavix, BB, CCB  -Needs statin on board  -IS usage and ambulation when able    1/9/25  -Stable CV wise  -Continue ASA, Plavix, BB, CCB  -Statin as tolerated  -IS usage and ambulation

## 2025-01-09 NOTE — PLAN OF CARE
Pt did well overnight. Remains AAOx4. SR w/1st deg AVB & occ PVCs on monitor. VSS. 2LNC, reinforced IS use. Epi gtt weaned off before 2200; wen and cardiac monitoring discontinued. CTs w/10mL or less per hr of serosanguinous OP each. Min serosanguinous OP to JPs. MSI WNL. 440mL UOP per sanchez. CBGs WNL. Pt freq repositions independently, heels floated. Full CHG bath, linen change, and up to recliner x 1 min assist this AM. Pt participated in bath. Educated on sternal precautions. Fall precautions in place, bed alarm on. POC discussed w/pt and daughter, AGNIESZKA.

## 2025-01-09 NOTE — ASSESSMENT & PLAN NOTE
Post CTS  VAP prophylaxis  Abg reviewed  Plan wean to extubate per protocol once awake post anesthesia    1/8 - extubated early this morning.  Stable on NC currently.  1/9 - stable on 2L via NC currently.  Encouraged PT/OT and educated on IS use.

## 2025-01-09 NOTE — PROGRESS NOTES
Subjective:      Patient ID: Leonidas Bautista is a 74 y.o. male.    Chief Complaint: No chief complaint on file.    HPI:  74-year-old male with a history of smoking hypertension hyperlipidemia and family history of coronary artery disease had a positive stress test for his exertional angina patient had a left heart catheterization showed multivessel coronary artery disease the patient had a PCI of his LAD in 2003.  He has been admitted for management of his multivessel coronary artery disease.   DATE OF PROCEDURE: 01/07/2025     ATTENDING SURGEON:  Sidra Guardado M.D.     ASSISTANT:      ANESTHESIOLOGIST:  Dr. Rodas     Pre-op Diagnosis: Coronary artery disease involving native coronary artery of native heart with unstable angina pectoris [I25.110]  Hypertension  Hyperlipidemia  COPD  Renal insufficiency  Peripheral arterial disease  Coronary artery disease     Post-op Diagnosis:  Same      Procedure(s):  CORONARY ARTERY BYPASS GRAFT (CABG)  SURGICAL PROCUREMENT, VEIN, ENDOSCOPIC  ECHOCARDIOGRAM,TRANSESOPHAGEAL  BLOCK, NERVE, INTERCOSTAL, 2 OR MORE      PROCEDURAL SUMMARY:   Coronary artery bypass graft, x 4  1) Pedicled LIMA to LAD  2) Saphenous vein graft to PDA  3) Saphenous vein graft to obtuse marginal  4) Saphenous vein graft to diagonal      Endoscopic vein harvesting from the left leg   Multiple intercostal nerve blocks with 0.25% Marcaine   Transesophageal echocardiogram findings:              1) Estimated ejection fraction 55 %              2) Ascending aorta without significant calcifications  01/08/2025 patient is extubated sitting up in a chair he is on minimal dose of epinephrine chest tube output moderate sanguinous the patient has Paul draining clear urine.  REBECCA drain in the leg draining serous drainage.  Awake alert pain well controlled overnight.      Review of patient's allergies indicates:  No Known Allergies    Past Medical History:   Diagnosis Date    Abnormal nuclear stress test 01/05/2025     Bilateral carotid artery stenosis 01/05/2025    CAD S/P percutaneous coronary angioplasty 2003    Stent mid LAD July 15, 2003    Carotid artery disease     Coronary artery disease involving native coronary artery of native heart without angina pectoris 01/05/2025    Diastolic dysfunction     Essential (primary) hypertension     Resistent    Graves disease     hyperthroidism    Hyperlipidemia     Hypertensive heart disease     Other hyperlipidemia 01/05/2025    Presence of drug coated stent in LAD coronary artery 01/05/2025    Primary hypertension 01/05/2025       No family history on file.    Social History     Socioeconomic History    Marital status:    Tobacco Use    Smoking status: Every Day     Types: Cigarettes    Smokeless tobacco: Never     Social Drivers of Health     Financial Resource Strain: Low Risk  (1/7/2025)    Overall Financial Resource Strain (CARDIA)     Difficulty of Paying Living Expenses: Not very hard   Food Insecurity: No Food Insecurity (1/7/2025)    Hunger Vital Sign     Worried About Running Out of Food in the Last Year: Never true     Ran Out of Food in the Last Year: Never true   Transportation Needs: No Transportation Needs (1/7/2025)    TRANSPORTATION NEEDS     Transportation : No   Stress: No Stress Concern Present (1/7/2025)    [employer] [place] [employer] - Occupational Stress Questionnaire     Feeling of Stress : Not at all   Housing Stability: Low Risk  (1/7/2025)    Housing Stability Vital Sign     Unable to Pay for Housing in the Last Year: No     Homeless in the Last Year: No       Past Surgical History:   Procedure Laterality Date    ANGIOGRAPHY OF INTERNAL MAMMARY VESSEL Left 1/6/2025    Procedure: Angiogram Internal Mammary;  Surgeon: Erika Gomes MD;  Location: [facility] CATH LAB;  Service: Cardiology;  Laterality: Left;    ARTERIOGRAPHY OF SUBCLAVIAN ARTERY Left 1/6/2025    Procedure: ARTERIOGRAM, SUBCLAVIAN;  Surgeon: Erika Gomes MD;  Location: [facility]  "CATH LAB;  Service: Cardiology;  Laterality: Left;    LEFT HEART CATHETERIZATION Left 1/6/2025    Procedure: Left heart cath;  Surgeon: Erika Gomes MD;  Location: [facility] CATH LAB;  Service: Cardiology;  Laterality: Left;    ptca with Stent mid LAD in 2003 N/A        Review of Systems   Constitutional:  Negative for activity change and appetite change.   HENT:  Negative for dental problem, nosebleeds and sore throat.    Eyes:  Negative for discharge and visual disturbance.   Respiratory:  Negative for cough, chest tightness and stridor.    Cardiovascular:  Negative for leg swelling.   Gastrointestinal:  Negative for abdominal distention and abdominal pain.   Genitourinary:  Negative for difficulty urinating and dysuria.   Musculoskeletal:  Negative for arthralgias, back pain and joint swelling.   Allergic/Immunologic: Negative for environmental allergies.   Neurological:  Negative for dizziness, syncope and headaches.   Hematological:  Does not bruise/bleed easily.   Psychiatric/Behavioral:  Negative for behavioral problems.           Objective:   BP (!) 142/66   Pulse 96   Temp 99 °F (37.2 °C)   Resp (!) 41   Ht 5' 10" (1.778 m)   Wt 82.1 kg (181 lb)   SpO2 (!) 92%   BMI 25.97 kg/m²     X-Ray Chest AP Portable  Narrative: EXAMINATION:  XR CHEST AP PORTABLE    CLINICAL HISTORY:  Presence of aortocoronary bypass graftPost-op;    COMPARISON:  January 7, 2025    FINDINGS:  Stable endotracheal tube and right jugular central line.  Stable bilateral chest tubes without pneumothorax.  Stable large-bore mediastinal drain.  Stable left retrocardiac infiltrate.  Stable right lower lobe scarring or atelectasis.  Negative for new pulmonary opacities.  The hilar and mediastinal contours and osseous structures are unchanged.  Impression: 1.  Overall, no significant interval change has occurred.    Electronically signed by: Vadim Zurita MD  Date:    01/08/2025  Time:    07:11         Physical Exam  Vitals reviewed. "   Constitutional:       Appearance: Normal appearance. He is obese.      Comments: Two mediastinal 1 left pleural and right pleural chest tube Paul draining clear urine we will L nasal cannula   HENT:      Head: Normocephalic and atraumatic.   Cardiovascular:      Rate and Rhythm: Normal rate and regular rhythm.      Pulses: Normal pulses.      Heart sounds:      Friction rub present.      Comments: Incision clean and dry sternum is stable leg incision is healing  Pulmonary:      Effort: Pulmonary effort is normal.      Breath sounds: Normal breath sounds.   Abdominal:      Palpations: Abdomen is soft.   Musculoskeletal:         General: Normal range of motion.      Cervical back: Normal range of motion and neck supple.   Skin:     General: Skin is warm.      Capillary Refill: Capillary refill takes less than 2 seconds.   Neurological:      General: No focal deficit present.      Mental Status: He is alert and oriented to person, place, and time.   Psychiatric:         Mood and Affect: Mood normal.     Chest x-ray shows postsurgical changes with bilateral atelectasis    Assessment:     1. Abnormal nuclear stress test    2. Abnormal EKG    3. Coronary artery disease involving native coronary artery of native heart with unstable angina pectoris    4. S/P PTCA (percutaneous transluminal coronary angioplasty)    5. S/P coronary artery stent placement    6. PSVT (paroxysmal supraventricular tachycardia)    7. Abnormal Holter monitor finding    8. CAD, multiple vessel    9. Hypertension, unspecified type    10. Pre-operative cardiovascular examination    11. S/P CABG x 4    12. Post-operative state          Plan   Postop day 1 status post CABG x4.  Neuro patient awake pain control as needed with Tylenol and narcotic  Cardiovascular aspirin Plavix beta-blocker  Hyperlipidemia statin  Respiratory aggressive pulmonary toilet incentive spirometry 2 L nasal cannula  Diet cardiac  Renal leave the Paul catheter  Electrolyte  replacement protocol  Hyperglycemia insulin sliding scale  Anemia multifactorial continue to follow    PTOT  Out of bed ambulate.          Sidra Guardado MD  [facility]  [place]

## 2025-01-09 NOTE — SUBJECTIVE & OBJECTIVE
Interval History: No acute events.  Chest tubes out and pacer out.  Stable on NC.  Educated on IS use.      Objective:     Vital Signs (Most Recent):  Temp: 99.7 °F (37.6 °C) (01/09/25 0915)  Pulse: 74 (01/09/25 0915)  Resp: (!) 34 (01/09/25 0915)  BP: (!) 79/41 (01/09/25 0915)  SpO2: (!) 85 % (01/09/25 0900) Vital Signs (24h Range):  Temp:  [93 °F (33.9 °C)-99.7 °F (37.6 °C)] 99.7 °F (37.6 °C)  Pulse:  [] 74  Resp:  [14-51] 34  SpO2:  [73 %-98 %] 85 %  BP: ()/(41-97) 79/41  Arterial Line BP: ()/(46-84) 143/84     Weight: 82.1 kg (181 lb)  Body mass index is 25.97 kg/m².      Intake/Output Summary (Last 24 hours) at 1/9/2025 0951  Last data filed at 1/9/2025 0900  Gross per 24 hour   Intake 719.84 ml   Output 1738 ml   Net -1018.16 ml        Physical Exam  Vitals and nursing note reviewed.   Constitutional:       General: He is not in acute distress.     Comments: Bedside chair   Cardiovascular:      Rate and Rhythm: Normal rate and regular rhythm.      Pulses: Normal pulses.      Heart sounds: No murmur heard.     Comments: Sternotomy is clean and approximated  Pulmonary:      Effort: Pulmonary effort is normal. No respiratory distress.      Breath sounds: No wheezing, rhonchi or rales.   Abdominal:      General: Abdomen is flat. There is no distension.      Tenderness: There is no abdominal tenderness.   Musculoskeletal:      Right lower leg: No edema.      Left lower leg: No edema.   Neurological:      General: No focal deficit present.      Mental Status: He is alert and oriented to person, place, and time. Mental status is at baseline.           Review of Systems    Vents:  Vent Mode: Spont (01/08/25 0540)  Ventilator Initiated: Yes (01/07/25 1902)  Set Rate: 24 BPM (01/08/25 0227)  Vt Set: 470 mL (01/08/25 0227)  Pressure Support: 5 cmH20 (01/08/25 0540)  PEEP/CPAP: 5 cmH20 (01/08/25 0540)  Oxygen Concentration (%): 28 (01/08/25 1407)  Peak Airway Pressure: 10 cmH20 (01/08/25 0540)  Plateau  Pressure: 0 cmH20 (01/08/25 0540)  Total Ve: 9.14 L/m (01/08/25 0540)  Negative Inspiratory Force (cm H2O): 0 (01/08/25 0540)  F/VT Ratio<105 (RSBI): (!) 25.82 (01/08/25 0530)    Lines/Drains/Airways       Central Venous Catheter Line  Duration             Percutaneous Central Line - Triple Lumen  01/07/25 1351 Internal Jugular Right 1 day              Drain  Duration                  Urethral Catheter 01/07/25 1350 Double-lumen;Silicone;Non-latex;Straight-tip;Temperature probe 16 Fr. 1 day              Peripheral Intravenous Line  Duration                  Peripheral IV - Single Lumen 01/06/25 0923 20 G Anterior;Right Forearm 3 days                    Significant Labs:    CBC/Anemia Profile:  Recent Labs   Lab 01/07/25 1910 01/07/25 1913 01/08/25 0347 01/09/25 0447   WBC 21.94*  --  18.34* 21.36*   HGB 10.5*  --  9.5* 8.9*   HCT 31.7* 28* 29.1* 27.0*   *  --  150 128*   *  --  105* 104*   RDW 13.9  --  13.9 14.2        Chemistries:  Recent Labs   Lab 01/07/25 1910 01/08/25 0347 01/08/25  1734 01/09/25 0447    140  --  139   K 5.2* 3.6  --  4.3    108  --  107   CO2 22* 18*  --  26   BUN 20 26*  --  36*   CREATININE 1.7* 2.3*  --  2.2*   CALCIUM 9.8 9.4  --  9.6   ALBUMIN 2.4* 3.5  --  3.3*   PROT 4.0* 5.0*  --  4.8*   BILITOT 0.4 0.5  --  0.4   ALKPHOS 61 61  --  55   ALT 17 15  --  11   AST 43* 32  --  45*   MG 3.3* 2.8* 2.6 2.4       All pertinent labs within the past 24 hours have been reviewed.    Significant Imaging:  I have reviewed all pertinent imaging results/findings within the past 24 hours.

## 2025-01-09 NOTE — PROGRESS NOTES
[facility] - [facility] ([facility])  Critical Care Medicine  Progress Note    Patient Name: Leonidas Bautista  MRN: [id number]  Admission Date: 1/6/2025  Hospital Length of Stay: 1 days  Code Status: Full Code  Attending Provider: Sidra Guardado MD  Primary Care Provider: No primary care provider on file.   Principal Problem: S/P CABG x 4    Subjective:     HPI:  74 year old male with known medical issues including HTN; HLD; hyperthyroid; diastolic dysfunction; tobacco smoking; and CAD s/p stent to LAD in 2003.   Pre admit labs show CKD3b (Cr 1.9, GFR 36) no prior labs available.    Presented to [facility] on 1/6/25 for LHC after palpitation with NSVT on holter and abnormal cardiolite with significant defect in LAD distribution    Hospital/ICU Course:  1/6 - LHC revealed 3v CAD with nl lvf; CT surgery consulted  1/7 - underwent CABG x 4. Transferred to ICU post op. Remains sedated, paralyzed on mech vent via OETT at arrival on lidocaine, levophed, and precedex infusions  1/8 - extubated early morning.  Stable on NC currently.  Remains on Epi gtt.  Alert and conversant.   1/9 - bedside chair.  Chest tubes and pacer out.  Stable on NC.    Interval History: No acute events.  Chest tubes out and pacer out.  Stable on NC.  Educated on IS use.      Objective:     Vital Signs (Most Recent):  Temp: 99.7 °F (37.6 °C) (01/09/25 0915)  Pulse: 74 (01/09/25 0915)  Resp: (!) 34 (01/09/25 0915)  BP: (!) 79/41 (01/09/25 0915)  SpO2: (!) 85 % (01/09/25 0900) Vital Signs (24h Range):  Temp:  [93 °F (33.9 °C)-99.7 °F (37.6 °C)] 99.7 °F (37.6 °C)  Pulse:  [] 74  Resp:  [14-51] 34  SpO2:  [73 %-98 %] 85 %  BP: ()/(41-97) 79/41  Arterial Line BP: ()/(46-84) 143/84     Weight: 82.1 kg (181 lb)  Body mass index is 25.97 kg/m².      Intake/Output Summary (Last 24 hours) at 1/9/2025 0951  Last data filed at 1/9/2025 0900  Gross per 24 hour   Intake 719.84 ml   Output 1738 ml   Net -1018.16 ml        Physical Exam  Vitals and  nursing note reviewed.   Constitutional:       General: He is not in acute distress.     Comments: Bedside chair   Cardiovascular:      Rate and Rhythm: Normal rate and regular rhythm.      Pulses: Normal pulses.      Heart sounds: No murmur heard.     Comments: Sternotomy is clean and approximated  Pulmonary:      Effort: Pulmonary effort is normal. No respiratory distress.      Breath sounds: No wheezing, rhonchi or rales.   Abdominal:      General: Abdomen is flat. There is no distension.      Tenderness: There is no abdominal tenderness.   Musculoskeletal:      Right lower leg: No edema.      Left lower leg: No edema.   Neurological:      General: No focal deficit present.      Mental Status: He is alert and oriented to person, place, and time. Mental status is at baseline.           Review of Systems    Vents:  Vent Mode: Spont (01/08/25 0540)  Ventilator Initiated: Yes (01/07/25 1902)  Set Rate: 24 BPM (01/08/25 0227)  Vt Set: 470 mL (01/08/25 0227)  Pressure Support: 5 cmH20 (01/08/25 0540)  PEEP/CPAP: 5 cmH20 (01/08/25 0540)  Oxygen Concentration (%): 28 (01/08/25 1407)  Peak Airway Pressure: 10 cmH20 (01/08/25 0540)  Plateau Pressure: 0 cmH20 (01/08/25 0540)  Total Ve: 9.14 L/m (01/08/25 0540)  Negative Inspiratory Force (cm H2O): 0 (01/08/25 0540)  F/VT Ratio<105 (RSBI): (!) 25.82 (01/08/25 0530)    Lines/Drains/Airways       Central Venous Catheter Line  Duration             Percutaneous Central Line - Triple Lumen  01/07/25 1351 Internal Jugular Right 1 day              Drain  Duration                  Urethral Catheter 01/07/25 1350 Double-lumen;Silicone;Non-latex;Straight-tip;Temperature probe 16 Fr. 1 day              Peripheral Intravenous Line  Duration                  Peripheral IV - Single Lumen 01/06/25 0923 20 G Anterior;Right Forearm 3 days                    Significant Labs:    CBC/Anemia Profile:  Recent Labs   Lab 01/07/25  1910 01/07/25  1913 01/08/25  0347 01/09/25  0447   WBC 21.94*  --   18.34* 21.36*   HGB 10.5*  --  9.5* 8.9*   HCT 31.7* 28* 29.1* 27.0*   *  --  150 128*   *  --  105* 104*   RDW 13.9  --  13.9 14.2        Chemistries:  Recent Labs   Lab 01/07/25  1910 01/08/25  0347 01/08/25  1734 01/09/25  0447    140  --  139   K 5.2* 3.6  --  4.3    108  --  107   CO2 22* 18*  --  26   BUN 20 26*  --  36*   CREATININE 1.7* 2.3*  --  2.2*   CALCIUM 9.8 9.4  --  9.6   ALBUMIN 2.4* 3.5  --  3.3*   PROT 4.0* 5.0*  --  4.8*   BILITOT 0.4 0.5  --  0.4   ALKPHOS 61 61  --  55   ALT 17 15  --  11   AST 43* 32  --  45*   MG 3.3* 2.8* 2.6 2.4       All pertinent labs within the past 24 hours have been reviewed.    Significant Imaging:  I have reviewed all pertinent imaging results/findings within the past 24 hours.    ABG  Recent Labs   Lab 01/08/25  0345   PH 7.317*   PO2 90   PCO2 39.9   HCO3 20.4*   BE -6*     Assessment/Plan:     Pulmonary  On mechanically assisted ventilation  Post CTS  VAP prophylaxis  Abg reviewed  Plan wean to extubate per protocol once awake post anesthesia    1/8 - extubated early this morning.  Stable on NC currently.  1/9 - stable on 2L via NC currently.  Encouraged PT/OT and educated on IS use.    Cardiac/Vascular  * S/P CABG x 4  CTS managing  ASA, plavix, metoprolol as able    Other hyperlipidemia  Statin, as able      Primary hypertension  Now post on support  Monitor and resume antihypertensives as indicated    Coronary artery disease involving native coronary artery of native heart without angina pectoris  Patient with known CAD s/p CABG, which is uncontrolled Will continue ASA, Plavix, and Statin and monitor for S/Sx of angina/ACS. Continue to monitor on telemetry.     - now s/p CABG x 4 on 1/7 with Dr Guardado    Renal/  Stage 3b chronic kidney disease  No documented/reported history; based of pre admit labs with no prior available for review  Creatine stable for now.   BMP reviewed- noted Estimated Creatinine Clearance: 30.4 mL/min (A)  (based on SCr of 2.2 mg/dL (H)). according to latest data. Based on current GFR, CKD stage is stage 3 - GFR 30-59.   Monitor UOP and serial BMP and adjust therapy as needed.   Renally dose meds. Avoid nephrotoxic medications and procedures.    Endocrine  Graves' disease  TFTs wnl  Continue outpatient dose Tapazole    Other  Currently smokes tobacco  Unclear pack year history or willingness to stop  Address with education and recommendation for       Rudy Bernal MD  Critical Care Medicine  [facility] - Int[facility] ([facility])

## 2025-01-09 NOTE — PT/OT/SLP PROGRESS
"Occupational Therapy   Treatment    Name: Leonidas Bautista  MRN: [id number]  Admitting Diagnosis:  S/P CABG x 4  2 Days Post-Op    Recommendations:     Discharge Recommendations: Low Intensity Therapy (24/7 SPV and A)  Discharge Equipment Recommendations:  shower chair  Barriers to discharge:  None    Assessment:     Leonidas Bautista is a 74 y.o. male with a medical diagnosis of S/P CABG x 4.  He presents with the following performance deficits affecting function are weakness, impaired endurance, impaired self care skills, impaired functional mobility, impaired balance, impaired cardiopulmonary response to activity, decreased safety awareness (sternal precautions).     Rehab Prognosis:  Good; patient would benefit from acute skilled OT services to address these deficits and reach maximum level of function.       Plan:     Patient to be seen 2 x/week to address the above listed problems via self-care/home management, therapeutic activities, therapeutic exercises  Plan of Care Expires: 01/22/25  Plan of Care Reviewed with: patient, daughter    Subjective     Chief Complaint: Reported "I am ready to try."  Patient/Family Comments/goals: increase independence  Pain/Comfort:  Pain Rating 1: 0/10    Objective:     Communicated with: NurseAdelaide, prior to session.  Patient found up in chair with blood pressure cuff, pulse ox (continuous), telemetry, peripheral IV, sanchez catheter upon OT entry to room.    General Precautions: Standard, fall, sternal    Orthopedic Precautions:N/A  Braces: N/A  Respiratory Status: Room air     Occupational Performance:     Functional Mobility/Transfers:  Patient completed Sit <> Stand Transfer with contact guard assistance  with  rolling walker   Patient completed Bed <> Chair Transfer using Step Transfer technique with minimum assistance with rolling walker  Functional Mobility: Patient completed x80ft functional mobility with RW and min A to increase dynamic standing balance and activity tolerance " needed for ADL completion.  X1 significant LOB requiring mod A to correct  V/c for technique with transfers to increase safety and independence with completion  Cueing for sternal precaution compliance throughout    [facility] 6 Click ADL: 16    Treatment & Education:  Daughter present and educated on KYMITT document and it's contents. Reviewed functional implications of sternal precaution with patient as he reports poor recall of previous education. Encouraged completion of B UE AROM therex throughout the day to increase functional strength and activity tolerance needed for ADL completion. Educated on benefits of OOB activity and importance of calling for A to transfer back to bed. Patient stated understanding and in agreement with POC.    Patient left up in chair with all lines intact, call button in reach, chair alarm on, and daughter present    GOALS:   Multidisciplinary Problems       Occupational Therapy Goals          Problem: Occupational Therapy    Goal Priority Disciplines Outcome Interventions   Occupational Therapy Goal     OT, PT/OT Progressing    Description: Goals to be met by: 1/22/25     Patient will increase functional independence with ADLs by performing:    Toileting from bedside commode with Minimal Assistance for hygiene and clothing management.   Toilet transfer to bedside commode with Minimal Assistance.  Upper extremity exercise program x10 reps per handout, with supervision within sternal precautions.                         Time Tracking:     OT Date of Treatment: 01/09/25  OT Start Time: 1020  OT Stop Time: 1045  OT Total Time (min): 25 min    Billable Minutes:Therapeutic Activity 25    OT/RENEE: OT     Number of RENEE visits since last OT visit: 0    Sadie Gutierrez OT  1/9/2025

## 2025-01-09 NOTE — SUBJECTIVE & OBJECTIVE
Review of Systems   Constitutional: Positive for malaise/fatigue.   HENT: Negative.     Eyes: Negative.    Cardiovascular:  Positive for chest pain (incisional).   Respiratory:  Positive for shortness of breath.    Endocrine: Negative.    Hematologic/Lymphatic: Bruises/bleeds easily.   Skin: Negative.    Musculoskeletal:  Positive for arthritis and joint pain.   Gastrointestinal: Negative.    Genitourinary: Negative.    Neurological: Negative.    Psychiatric/Behavioral: Negative.     Allergic/Immunologic: Negative.      Objective:     Vital Signs (Most Recent):  Temp: 99.3 °F (37.4 °C) (01/09/25 1245)  Pulse: 69 (01/09/25 1245)  Resp: (!) 29 (01/09/25 1245)  BP: (!) 107/57 (01/09/25 1200)  SpO2: (!) 85 % (01/09/25 0900) Vital Signs (24h Range):  Temp:  [93 °F (33.9 °C)-99.9 °F (37.7 °C)] 99.3 °F (37.4 °C)  Pulse:  [] 69  Resp:  [14-44] 29  SpO2:  [73 %-98 %] 85 %  BP: ()/(41-97) 107/57  Arterial Line BP: ()/(55-84) 143/84     Weight: 82.1 kg (181 lb)  Body mass index is 25.97 kg/m².     SpO2: (!) 85 %         Intake/Output Summary (Last 24 hours) at 1/9/2025 1310  Last data filed at 1/9/2025 1000  Gross per 24 hour   Intake 585.42 ml   Output 1203 ml   Net -617.58 ml       Lines/Drains/Airways       Central Venous Catheter Line  Duration             Percutaneous Central Line - Triple Lumen  01/07/25 1351 Internal Jugular Right 1 day              Drain  Duration                  Urethral Catheter 01/07/25 1350 Double-lumen;Silicone;Non-latex;Straight-tip;Temperature probe 16 Fr. 1 day              Peripheral Intravenous Line  Duration                  Peripheral IV - Single Lumen 01/06/25 0923 20 G Anterior;Right Forearm 3 days                       Physical Exam  Vitals and nursing note reviewed.   Constitutional:       Appearance: Normal appearance.   HENT:      Head: Normocephalic and atraumatic.   Eyes:      Pupils: Pupils are equal, round, and reactive to light.   Cardiovascular:      Rate  "and Rhythm: Normal rate and regular rhythm.      Heart sounds: S1 normal and S2 normal. No murmur heard.     Comments: Sternotomy site C/D/I; dressing in place  Pulmonary:      Effort: Pulmonary effort is normal.      Comments: Diminished BS  Abdominal:      Palpations: Abdomen is soft.   Musculoskeletal:      Right lower leg: Edema present.      Left lower leg: Edema present.   Skin:     General: Skin is warm and dry.   Neurological:      General: No focal deficit present.      Mental Status: He is oriented to person, place, and time.   Psychiatric:         Mood and Affect: Mood normal.         Behavior: Behavior normal.         Thought Content: Thought content normal.            Significant Labs: CMP   Recent Labs   Lab 01/07/25 1910 01/08/25 0347 01/09/25 0447    140 139   K 5.2* 3.6 4.3    108 107   CO2 22* 18* 26   * 280* 113*   BUN 20 26* 36*   CREATININE 1.7* 2.3* 2.2*   CALCIUM 9.8 9.4 9.6   PROT 4.0* 5.0* 4.8*   ALBUMIN 2.4* 3.5 3.3*   BILITOT 0.4 0.5 0.4   ALKPHOS 61 61 55   AST 43* 32 45*   ALT 17 15 11   ANIONGAP 8 14 6*   , CBC   Recent Labs   Lab 01/07/25 1910 01/07/25 1913 01/08/25 0347 01/09/25  0447   WBC 21.94*  --  18.34* 21.36*   HGB 10.5*  --  9.5* 8.9*   HCT 31.7*   < > 29.1* 27.0*   *  --  150 128*    < > = values in this interval not displayed.   , Troponin No results for input(s): "TROPONINIHS" in the last 48 hours., and All pertinent lab results from the last 24 hours have been reviewed.    Significant Imaging: Echocardiogram: Transthoracic echo (TTE) complete (Cupid Only): No results found for this or any previous visit. and EKG: Reviewed  "

## 2025-01-09 NOTE — PT/OT/SLP PROGRESS
Physical Therapy Treatment    Patient Name:  Leonidas Bautista   MRN:  [id number]    Recommendations:     Discharge Recommendations: Low Intensity Therapy  Discharge Equipment Recommendations: shower chair  Barriers to discharge: None    Assessment:     Leonidas Bautista is a 74 y.o. male admitted with a medical diagnosis of S/P CABG x 4.  He presents with the following impairments/functional limitations: weakness, impaired endurance, gait instability, impaired functional mobility, impaired balance, decreased coordination, decreased safety awareness.    Rehab Prognosis: Good; patient would benefit from acute skilled PT services to address these deficits and reach maximum level of function.    Recent Surgery: Procedure(s) (LRB):  CORONARY ARTERY BYPASS GRAFT (CABG) (N/A)  SURGICAL PROCUREMENT, VEIN, ENDOSCOPIC (Right)  ECHOCARDIOGRAM,TRANSESOPHAGEAL (N/A)  BLOCK, NERVE, INTERCOSTAL, 2 OR MORE (N/A) 2 Days Post-Op    Plan:     During this hospitalization, patient to be seen 3 x/week to address the identified rehab impairments via gait training, therapeutic activities, therapeutic exercises and progress toward the following goals:    Plan of Care Expires:  01/23/25    Subjective     Chief Complaint: NONE, EAGER TO PARTICIPATE  Patient/Family Comments/goals: READY TO WALK  Pain/Comfort:  Pain Rating 1: 0/10      Objective:     Communicated with NURSE MORRIS prior to session.  Patient found up in chair with blood pressure cuff, telemetry, pulse ox (continuous), sanchez catheter upon PT entry to room.     General Precautions: Standard, fall, sternal  Orthopedic Precautions: N/A  Braces: N/A  Respiratory Status: Nasal cannula, flow 2 L/min, OK TO WALK ON ROOM AIR PER NURSE MORRIS     Functional Mobility:  Bed Mobility:     Scooting: stand by assistance-SEATED SCOOT FWD/BWD IN CHAIR  Transfers:     Sit to Stand:  CGA  Stand to sit: CGA  Gait: PT AMB 80' WITH RW AND MIN/CGA, CUES FOR UPRIGHT POSTURE DUE TO FF AT HEAD AND TRUNK, CUES FOR  "RW SAFETY/MANAGEMENT, I SMALL LOB REQUIRING CHANI TO RECOVER BALANCE, PT DENIES DIZZINESS, ELEVATED HR WITH EXERTION  Balance: GOOD SITTING BALANCE, FAIR- DYNAMIC BALANCE DURING GAIT  PT EDUCATED IN AND PERFORMED SEATED BLE THEREX X 8 REPS AROM WITH REST AS NEEDED: HIP FLEX/EXT, LAQ, QUAD SET, HEEL SLIDES, AP'S  REVIEW KYMITT STERNAL PRECAUTIONS IN RELATION TO FUNCTIONAL MOBILITY WITH PT AND DAUGHTER    AM-PAC 6 CLICK MOBILITY  Turning over in bed (including adjusting bedclothes, sheets and blankets)?: 3  Sitting down on and standing up from a chair with arms (e.g., wheelchair, bedside commode, etc.): 3  Moving from lying on back to sitting on the side of the bed?: 3  Moving to and from a bed to a chair (including a wheelchair)?: 3  Need to walk in hospital room?: 3  Climbing 3-5 steps with a railing?: 1  Basic Mobility Total Score: 16     Treatment & Education:  PT EDUCATION:  - ROLE OF P.T. AND POC IN ACUTE CARE HOSPITAL SETTING  - RW USE AND SAFETY DURING TF'S AND GAIT  - ENCOURAGED TO INCREASE TIME OOB IN CHAIR TO TOLERANCE   - TO CONTINUE THERAPUETIC EXERCISES THROUGHOUT THE DAY TO INCREASE ACTIVITY TOLERANCE AND DECREASE RISK FOR PNEUMONIA AND BLOOD CLOTS: HIP FLEX/EXT, HIP ABD/ADD, QUAD SET, HEEL SLIDE, AP  - RISK FOR FALLS DUE TO GENERALIZED WEAKNESS, EDUCATED ON "CALL DON'T FALL", ENCOURAGED TO CALL FOR ASSISTANCE WITH ALL NEEDS SUCH AS BED<>CHAIR TRANSFERS OR TRIPS TO BATHROOM, PT AGREEABLE TO SAFETY PRECAUTIONS    Patient left up in chair with all lines intact, call button in reach, chair alarm on, NURSE notified, and DAUGHTER present..    GOALS:   Multidisciplinary Problems       Physical Therapy Goals          Problem: Physical Therapy    Goal Priority Disciplines Outcome Interventions   Physical Therapy Goal     PT, PT/OT Progressing    Description: LTG'S TO BE MET IN 14 DAYS (1-22-25)  PT WILL REQUIRE SPV FOR BED MOBILITY  PT WILL REQUIRE SPV FOR BED<>CHAIR TF'S  PT WILL  FEET WITH OR WITHOUT RW " AND SPV  PT WILL INC AMPAC SCORE BY 2 POINTS TO PROGRESS GROSS FUNC MOBILITY                         Time Tracking:     PT Received On: 01/09/25  PT Start Time: 1020     PT Stop Time: 1045  PT Total Time (min): 25 min     Billable Minutes: Gait Training 10 and Therapeutic Activity 15    Treatment Type: Treatment  PT/PTA: PT     Number of PTA visits since last PT visit: 0     01/09/2025

## 2025-01-09 NOTE — PLAN OF CARE
01/09/25 1508   Post-Acute Status   Post-Acute Authorization Home Health   Home Health Status Referrals Sent   Discharge Plan   Discharge Plan A Home Health     Referrals sent to in network facilities. Will have patient pick preference from accepting agencies.

## 2025-01-10 LAB
ALBUMIN SERPL BCP-MCNC: 3.2 G/DL (ref 3.5–5.2)
ALP SERPL-CCNC: 63 U/L (ref 40–150)
ALT SERPL W/O P-5'-P-CCNC: 16 U/L (ref 10–44)
ANION GAP SERPL CALC-SCNC: 13 MMOL/L (ref 8–16)
AST SERPL-CCNC: 56 U/L (ref 10–40)
BILIRUB SERPL-MCNC: 0.5 MG/DL (ref 0.1–1)
BUN SERPL-MCNC: 52 MG/DL (ref 8–23)
CALCIUM SERPL-MCNC: 9.7 MG/DL (ref 8.7–10.5)
CHLORIDE SERPL-SCNC: 103 MMOL/L (ref 95–110)
CO2 SERPL-SCNC: 20 MMOL/L (ref 23–29)
CREAT SERPL-MCNC: 2.1 MG/DL (ref 0.5–1.4)
ERYTHROCYTE [DISTWIDTH] IN BLOOD BY AUTOMATED COUNT: 14.3 % (ref 11.5–14.5)
EST. GFR  (NO RACE VARIABLE): 32 ML/MIN/1.73 M^2
GLUCOSE SERPL-MCNC: 106 MG/DL (ref 70–110)
HCT VFR BLD AUTO: 28.2 % (ref 40–54)
HGB BLD-MCNC: 9.2 G/DL (ref 14–18)
MAGNESIUM SERPL-MCNC: 3.1 MG/DL (ref 1.6–2.6)
MAGNESIUM SERPL-MCNC: 3.1 MG/DL (ref 1.6–2.6)
MCH RBC QN AUTO: 34.3 PG (ref 27–31)
MCHC RBC AUTO-ENTMCNC: 32.6 G/DL (ref 32–36)
MCV RBC AUTO: 105 FL (ref 82–98)
OHS QRS DURATION: 112 MS
OHS QTC CALCULATION: 424 MS
PLATELET # BLD AUTO: 148 K/UL (ref 150–450)
PMV BLD AUTO: 10.9 FL (ref 9.2–12.9)
POCT GLUCOSE: 115 MG/DL (ref 70–110)
POCT GLUCOSE: 115 MG/DL (ref 70–110)
POCT GLUCOSE: 118 MG/DL (ref 70–110)
POCT GLUCOSE: 55 MG/DL (ref 70–110)
POCT GLUCOSE: 78 MG/DL (ref 70–110)
POTASSIUM SERPL-SCNC: 4.4 MMOL/L (ref 3.5–5.1)
PROT SERPL-MCNC: 5.6 G/DL (ref 6–8.4)
RBC # BLD AUTO: 2.68 M/UL (ref 4.6–6.2)
SODIUM SERPL-SCNC: 136 MMOL/L (ref 136–145)
WBC # BLD AUTO: 18.06 K/UL (ref 3.9–12.7)

## 2025-01-10 PROCEDURE — 97116 GAIT TRAINING THERAPY: CPT | Mod: CQ

## 2025-01-10 PROCEDURE — 85027 COMPLETE CBC AUTOMATED: CPT | Performed by: THORACIC SURGERY (CARDIOTHORACIC VASCULAR SURGERY)

## 2025-01-10 PROCEDURE — 94799 UNLISTED PULMONARY SVC/PX: CPT | Mod: XB

## 2025-01-10 PROCEDURE — 93010 ELECTROCARDIOGRAM REPORT: CPT | Mod: ,,, | Performed by: INTERNAL MEDICINE

## 2025-01-10 PROCEDURE — 83735 ASSAY OF MAGNESIUM: CPT | Performed by: THORACIC SURGERY (CARDIOTHORACIC VASCULAR SURGERY)

## 2025-01-10 PROCEDURE — 25000003 PHARM REV CODE 250: Performed by: THORACIC SURGERY (CARDIOTHORACIC VASCULAR SURGERY)

## 2025-01-10 PROCEDURE — 80053 COMPREHEN METABOLIC PANEL: CPT | Performed by: THORACIC SURGERY (CARDIOTHORACIC VASCULAR SURGERY)

## 2025-01-10 PROCEDURE — 99232 SBSQ HOSP IP/OBS MODERATE 35: CPT | Mod: 25,,, | Performed by: INTERNAL MEDICINE

## 2025-01-10 PROCEDURE — 21400001 HC TELEMETRY ROOM

## 2025-01-10 PROCEDURE — 93005 ELECTROCARDIOGRAM TRACING: CPT

## 2025-01-10 PROCEDURE — 94761 N-INVAS EAR/PLS OXIMETRY MLT: CPT

## 2025-01-10 PROCEDURE — 36415 COLL VENOUS BLD VENIPUNCTURE: CPT | Performed by: THORACIC SURGERY (CARDIOTHORACIC VASCULAR SURGERY)

## 2025-01-10 PROCEDURE — 99900035 HC TECH TIME PER 15 MIN (STAT)

## 2025-01-10 PROCEDURE — 25000003 PHARM REV CODE 250: Performed by: INTERNAL MEDICINE

## 2025-01-10 PROCEDURE — 97530 THERAPEUTIC ACTIVITIES: CPT

## 2025-01-10 PROCEDURE — 97530 THERAPEUTIC ACTIVITIES: CPT | Mod: CQ

## 2025-01-10 RX ORDER — ATORVASTATIN CALCIUM 40 MG/1
40 TABLET, FILM COATED ORAL NIGHTLY
Status: DISCONTINUED | OUTPATIENT
Start: 2025-01-10 | End: 2025-01-11 | Stop reason: HOSPADM

## 2025-01-10 RX ORDER — AMIODARONE HYDROCHLORIDE 200 MG/1
200 TABLET ORAL DAILY
Status: DISCONTINUED | OUTPATIENT
Start: 2025-01-10 | End: 2025-01-11 | Stop reason: HOSPADM

## 2025-01-10 RX ORDER — LACTULOSE 10 G/15ML
30 SOLUTION ORAL 3 TIMES DAILY
Status: DISPENSED | OUTPATIENT
Start: 2025-01-10 | End: 2025-01-11

## 2025-01-10 RX ADMIN — CHLORHEXIDINE GLUCONATE 0.12% ORAL RINSE 10 ML: 1.2 LIQUID ORAL at 08:01

## 2025-01-10 RX ADMIN — MAGNESIUM HYDROXIDE 2400 MG: 400 SUSPENSION ORAL at 08:01

## 2025-01-10 RX ADMIN — APIXABAN 5 MG: 2.5 TABLET, FILM COATED ORAL at 09:01

## 2025-01-10 RX ADMIN — METOPROLOL TARTRATE 25 MG: 25 TABLET, FILM COATED ORAL at 08:01

## 2025-01-10 RX ADMIN — ATORVASTATIN CALCIUM 40 MG: 40 TABLET, FILM COATED ORAL at 09:01

## 2025-01-10 RX ADMIN — CLOPIDOGREL BISULFATE 75 MG: 75 TABLET ORAL at 08:01

## 2025-01-10 RX ADMIN — METOPROLOL TARTRATE 25 MG: 25 TABLET, FILM COATED ORAL at 09:01

## 2025-01-10 RX ADMIN — ASPIRIN 81 MG: 81 TABLET, COATED ORAL at 08:01

## 2025-01-10 RX ADMIN — METHIMAZOLE 10 MG: 10 TABLET ORAL at 08:01

## 2025-01-10 RX ADMIN — FAMOTIDINE 20 MG: 20 TABLET ORAL at 08:01

## 2025-01-10 RX ADMIN — POLYETHYLENE GLYCOL 3350 17 G: 17 POWDER, FOR SOLUTION ORAL at 08:01

## 2025-01-10 RX ADMIN — DOCUSATE SODIUM 100 MG: 100 CAPSULE, LIQUID FILLED ORAL at 09:01

## 2025-01-10 RX ADMIN — DOCUSATE SODIUM 100 MG: 100 CAPSULE, LIQUID FILLED ORAL at 08:01

## 2025-01-10 RX ADMIN — AMIODARONE HYDROCHLORIDE 200 MG: 200 TABLET ORAL at 04:01

## 2025-01-10 NOTE — PT/OT/SLP PROGRESS
"Occupational Therapy   Treatment    Name: Leonidas Bautista  MRN: [id number]  Admitting Diagnosis:  S/P CABG x 4  3 Days Post-Op    Recommendations:     Discharge Recommendations: Low Intensity Therapy  Discharge Equipment Recommendations:  none  Barriers to discharge:  None    Assessment:     Leonidas Bautista is a 74 y.o. male with a medical diagnosis of S/P CABG x 4.  He presents with the following performance deficits affecting function are weakness, impaired endurance, impaired functional mobility, gait instability, impaired balance, decreased safety awareness.     Rehab Prognosis:  Good; patient would benefit from acute skilled OT services to address these deficits and reach maximum level of function.       Plan:     Patient to be seen 2 x/week to address the above listed problems via self-care/home management, therapeutic activities, therapeutic exercises  Plan of Care Expires: 01/22/25  Plan of Care Reviewed with: patient    Subjective     Chief Complaint: "No pain today"  Patient/Family Comments/goals: Increase independence  Pain/Comfort:  Pain Rating 1: 0/10    Objective:     Communicated with: Cirilo and ASHVIN prior to session.  Patient found  Sitting on couch  with pulse ox (continuous), oxygen, blood pressure cuff upon OT entry to room.    General Precautions: Standard, fall, sternal    Orthopedic Precautions:N/A  Braces: N/A  Respiratory Status: Nasal cannula, flow 2 L/min     Occupational Performance:     Functional Mobility/Transfers:  Patient completed Sit <> Stand Transfer with stand by assistance  with  rolling walker   Functional Mobility: Patient completed x200ft functional mobility SBA w/ RW to increase dynamic standing balance and activity tolerance needed for ADL completion.  Pt completed another 100ft x2 with rest breaks  Pt required v/c for safety. Staying "inside the box" of the walker, and slowing down ambulation.  Pt completed stand pivot transfer from RW to couch with SBA     Activities of Daily " Living:  Denied any assistance with ADLs  Pt completed dressed with all items packed  Pt stated he independently dressed and packed       AMPAC 6 Click ADL: 24    Treatment & Education:  Encouraged completion of B UE AROM therex throughout the day to increase functional strength and activity tolerance needed for ADL completion.  OT role, plan of care, progression of goals, importance of continued OOB activity, ADL/functional transfer and mobility retraining, call don't fall, safety precautions, fall prevention.  Pt educated on Sternal precautions and given Sternal precautions handout for safe movement.    Patient left  on couch  with all lines intact and call button in reach    GOALS:   Multidisciplinary Problems       Occupational Therapy Goals          Problem: Occupational Therapy    Goal Priority Disciplines Outcome Interventions   Occupational Therapy Goal     OT, PT/OT Progressing    Description: Goals to be met by: 1/22/25     Patient will increase functional independence with ADLs by performing:    Toileting from bedside commode with Minimal Assistance for hygiene and clothing management.   Toilet transfer to bedside commode with Minimal Assistance.  Upper extremity exercise program x10 reps per handout, with supervision within sternal precautions.                         Time Tracking:     OT Date of Treatment: 01/10/25  OT Start Time: 0740  OT Stop Time: 0805  OT Total Time (min): 25 min    Billable Minutes:Therapeutic Activity 25    OT/RENEE: RENEE     Number of RENEE visits since last OT visit: 1  A client care conference was performed between the ANTWON and TERESA, prior to treatment by TERESA, to discuss the patient's status, treatment plan and established goals.  TERESA Abernathy  1/10/2025

## 2025-01-10 NOTE — CONSULTS
01/10/25 1510   Post-Acute Status   Post-Acute Authorization Home Health   Home Health Status Set-up Complete/Auth obtained   Discharge Delays None known at this time   Discharge Plan   Discharge Plan A Home Health     Patient accepted by [facility]. Patient and daughter agreeable to [facility] being set up.   Patient's daughter will be going home with Patient to be care at home.    SW selected [facility] via EPIC and information added to AVS.    Per Patient, expected DC tomorrow.

## 2025-01-10 NOTE — SUBJECTIVE & OBJECTIVE
Review of Systems   Constitutional: Positive for malaise/fatigue.   HENT: Negative.     Eyes: Negative.    Cardiovascular:  Positive for chest pain (incisional).   Respiratory: Negative.     Endocrine: Negative.    Hematologic/Lymphatic: Negative.    Skin: Negative.    Musculoskeletal:  Positive for arthritis and joint pain.   Gastrointestinal: Negative.    Genitourinary: Negative.    Neurological: Negative.    Psychiatric/Behavioral: Negative.     Allergic/Immunologic: Negative.      Objective:     Vital Signs (Most Recent):  Temp: 98.3 °F (36.8 °C) (01/10/25 1203)  Pulse: 67 (01/10/25 1203)  Resp: 18 (01/10/25 1203)  BP: 132/69 (01/10/25 1203)  SpO2: (!) 91 % (01/10/25 1203) Vital Signs (24h Range):  Temp:  [97.6 °F (36.4 °C)-99.9 °F (37.7 °C)] 98.3 °F (36.8 °C)  Pulse:  [63-91] 67  Resp:  [16-45] 18  SpO2:  [91 %-97 %] 91 %  BP: ()/(53-69) 132/69     Weight: 83.8 kg (184 lb 11.9 oz)  Body mass index is 26.51 kg/m².     SpO2: (!) 91 %         Intake/Output Summary (Last 24 hours) at 1/10/2025 1325  Last data filed at 1/10/2025 0958  Gross per 24 hour   Intake 200 ml   Output 500 ml   Net -300 ml       Lines/Drains/Airways       Peripheral Intravenous Line  Duration                  Peripheral IV - Single Lumen 01/06/25 0923 20 G Anterior;Right Forearm 4 days         Peripheral IV - Single Lumen 01/09/25 1341 20 G Anterior;Right Upper Arm <1 day                       Physical Exam  Vitals and nursing note reviewed.   Constitutional:       Appearance: Normal appearance.   HENT:      Head: Normocephalic and atraumatic.   Eyes:      Pupils: Pupils are equal, round, and reactive to light.   Cardiovascular:      Rate and Rhythm: Normal rate. Rhythm irregularly irregular.      Heart sounds: S1 normal and S2 normal. No murmur heard.  Pulmonary:      Breath sounds: Rhonchi present.      Comments: Diminished BS at bases  Musculoskeletal:      Right lower leg: Edema present.      Left lower leg: Edema present.  "  Skin:     General: Skin is warm and dry.   Neurological:      General: No focal deficit present.      Mental Status: He is oriented to person, place, and time.   Psychiatric:         Mood and Affect: Mood normal.         Behavior: Behavior normal.         Thought Content: Thought content normal.            Significant Labs: CMP   Recent Labs   Lab 01/09/25  0447 01/10/25  0517    136   K 4.3 4.4    103   CO2 26 20*   * 106   BUN 36* 52*   CREATININE 2.2* 2.1*   CALCIUM 9.6 9.7   PROT 4.8* 5.6*   ALBUMIN 3.3* 3.2*   BILITOT 0.4 0.5   ALKPHOS 55 63   AST 45* 56*   ALT 11 16   ANIONGAP 6* 13   , CBC   Recent Labs   Lab 01/09/25  0447 01/10/25  0517   WBC 21.36* 18.06*   HGB 8.9* 9.2*   HCT 27.0* 28.2*   * 148*   , Troponin No results for input(s): "TROPONINIHS" in the last 48 hours., and All pertinent lab results from the last 24 hours have been reviewed.    Significant Imaging: Echocardiogram: Transthoracic echo (TTE) complete (Cupid Only): No results found for this or any previous visit., EKG: Reviewed, and X-Ray: CXR: X-Ray Chest 1 View (CXR):   Results for orders placed or performed during the hospital encounter of 01/06/25   X-Ray Chest 1 View    Narrative    EXAMINATION:  XR CHEST 1 VIEW    CLINICAL HISTORY:  dyspnea;    COMPARISON:  January 9, 2025    FINDINGS:  Interval removal of bilateral chest tubes, with trace left apical pneumothorax.  Stable left greater than right basilar atelectasis/consolidation and left effusion.  Interval removal of right jugular central line.  The hilar and mediastinal contours and osseous structures are unchanged.      Impression    1.  Trace residual left pneumothorax status post removal of left-sided chest tube.  Negative for pneumothorax with removal of right-sided chest tube.    2.  Interval removal of right jugular central line.    3.  Stable findings as noted above.    4.  Follow-up radiographs recommended.      Electronically signed by: Vadim " MD Parminder  Date:    01/10/2025  Time:    07:02    and X-Ray Chest PA and Lateral (CXR): No results found for this visit on 01/06/25.

## 2025-01-10 NOTE — PROGRESS NOTES
Subjective:      Patient ID: Leonidas Bautista is a 74 y.o. male.    Chief Complaint: No chief complaint on file.    HPI:  74-year-old male with a history of smoking hypertension hyperlipidemia and family history of coronary artery disease had a positive stress test for his exertional angina patient had a left heart catheterization showed multivessel coronary artery disease the patient had a PCI of his LAD in 2003.  He has been admitted for management of his multivessel coronary artery disease.   Patient had a exertional angina and was admitted for ciritical stenosis  DATE OF PROCEDURE: 01/07/2025     ATTENDING SURGEON:  Sidra Guardado M.D.     ASSISTANT:      ANESTHESIOLOGIST:  Dr. Rodas     Pre-op Diagnosis: Coronary artery disease involving native coronary artery of native heart with unstable angina pectoris [I25.110]  Hypertension  Hyperlipidemia  COPD  Renal insufficiency  Peripheral arterial disease  Coronary artery disease     Post-op Diagnosis:  Same      Procedure(s):  CORONARY ARTERY BYPASS GRAFT (CABG)  SURGICAL PROCUREMENT, VEIN, ENDOSCOPIC  ECHOCARDIOGRAM,TRANSESOPHAGEAL  BLOCK, NERVE, INTERCOSTAL, 2 OR MORE      PROCEDURAL SUMMARY:   Coronary artery bypass graft, x 4  1) Pedicled LIMA to LAD  2) Saphenous vein graft to PDA  3) Saphenous vein graft to obtuse marginal  4) Saphenous vein graft to diagonal      Endoscopic vein harvesting from the left leg   Multiple intercostal nerve blocks with 0.25% Marcaine   Transesophageal echocardiogram findings:              1) Estimated ejection fraction 55 %              2) Ascending aorta without significant calcifications  01/08/2025 patient is extubated sitting up in a chair he is on minimal dose of epinephrine chest tube output moderate sanguinous the patient has Paul draining clear urine.  REBECCA drain in the leg draining serous drainage.  Awake alert pain well controlled overnight.      01/09/2025 patient is comfortable sitting up in a chair chest tube draining  minimal amount of serous sanguinous fluid the patient is in sinus rhythm with a occasional PVCs pain well controlled overnight appetite is back Paul draining clear urine  01/10/2025 the patient is transferred to the telemetry comfortably sitting ambulating physical therapy the patient appetite is back sinus rhythm with PVCs pain well controlled.    Review of patient's allergies indicates:  No Known Allergies    Past Medical History:   Diagnosis Date    Abnormal nuclear stress test 01/05/2025    Bilateral carotid artery stenosis 01/05/2025    CAD S/P percutaneous coronary angioplasty 2003    Stent mid LAD July 15, 2003    Carotid artery disease     Coronary artery disease involving native coronary artery of native heart without angina pectoris 01/05/2025    Diastolic dysfunction     Essential (primary) hypertension     Resistent    Graves disease     hyperthroidism    Hyperlipidemia     Hypertensive heart disease     Other hyperlipidemia 01/05/2025    Presence of drug coated stent in LAD coronary artery 01/05/2025    Primary hypertension 01/05/2025       No family history on file.    Social History     Socioeconomic History    Marital status:    Tobacco Use    Smoking status: Every Day     Types: Cigarettes    Smokeless tobacco: Never     Social Drivers of Health     Financial Resource Strain: Low Risk  (1/7/2025)    Overall Financial Resource Strain (CARDIA)     Difficulty of Paying Living Expenses: Not very hard   Food Insecurity: No Food Insecurity (1/7/2025)    Hunger Vital Sign     Worried About Running Out of Food in the Last Year: Never true     Ran Out of Food in the Last Year: Never true   Transportation Needs: No Transportation Needs (1/7/2025)    TRANSPORTATION NEEDS     Transportation : No   Stress: No Stress Concern Present (1/7/2025)    [employer] - Occupational Stress Questionnaire     Feeling of Stress : Not at all   Housing Stability: Low Risk  (1/7/2025)     Housing Stability Vital Sign     Unable to Pay for Housing in the Last Year: No     Homeless in the Last Year: No       Past Surgical History:   Procedure Laterality Date    ANGIOGRAPHY OF INTERNAL MAMMARY VESSEL Left 1/6/2025    Procedure: Angiogram Internal Mammary;  Surgeon: Erika Gomes MD;  Location: [facility] CATH LAB;  Service: Cardiology;  Laterality: Left;    ARTERIOGRAPHY OF SUBCLAVIAN ARTERY Left 1/6/2025    Procedure: ARTERIOGRAM, SUBCLAVIAN;  Surgeon: Erika Gomes MD;  Location: [facility] CATH LAB;  Service: Cardiology;  Laterality: Left;    CORONARY ARTERY BYPASS GRAFT (CABG) N/A 1/7/2025    Procedure: CORONARY ARTERY BYPASS GRAFT (CABG);  Surgeon: Sidra Guardado MD;  Location: [facility] OR;  Service: Cardiothoracic;  Laterality: N/A;  CABG x    ECHOCARDIOGRAM,TRANSESOPHAGEAL N/A 1/7/2025    Procedure: ECHOCARDIOGRAM,TRANSESOPHAGEAL;  Surgeon: Sidra Guardado MD;  Location: [facility] OR;  Service: Cardiothoracic;  Laterality: N/A;    ENDOSCOPIC HARVEST OF VEIN Right 1/7/2025    Procedure: SURGICAL PROCUREMENT, VEIN, ENDOSCOPIC;  Surgeon: Sidra Guardado MD;  Location: [facility] OR;  Service: Cardiothoracic;  Laterality: Right;    INJECTION OF ANESTHETIC AGENT AROUND MULTIPLE INTERCOSTAL NERVES N/A 1/7/2025    Procedure: BLOCK, NERVE, INTERCOSTAL, 2 OR MORE;  Surgeon: Sidra Guardado MD;  Location: [facility] OR;  Service: Cardiothoracic;  Laterality: N/A;  Para sternal nerve lock    LEFT HEART CATHETERIZATION Left 1/6/2025    Procedure: Left heart cath;  Surgeon: Erika Gomes MD;  Location: [facility] CATH LAB;  Service: Cardiology;  Laterality: Left;    ptca with Stent mid LAD in 2003 N/A        Review of Systems   Constitutional:  Negative for activity change and appetite change.   HENT:  Negative for dental problem, nosebleeds and sore throat.    Eyes:  Negative for discharge and visual disturbance.   Respiratory:  Negative for cough, chest tightness and stridor.    Cardiovascular:  Negative for leg swelling.  "  Gastrointestinal:  Negative for abdominal distention and abdominal pain.   Genitourinary:  Negative for difficulty urinating and dysuria.   Musculoskeletal:  Negative for arthralgias, back pain and joint swelling.   Allergic/Immunologic: Negative for environmental allergies.   Neurological:  Negative for dizziness, syncope and headaches.   Hematological:  Does not bruise/bleed easily.   Psychiatric/Behavioral:  Negative for behavioral problems.           Objective:   /69 (BP Location: Left arm, Patient Position: Sitting)   Pulse 67   Temp 98.3 °F (36.8 °C) (Oral)   Resp 18   Ht 5' 10" (1.778 m)   Wt 83.8 kg (184 lb 11.9 oz)   SpO2 (!) 91%   BMI 26.51 kg/m²     X-Ray Chest 1 View  Narrative: EXAMINATION:  XR CHEST 1 VIEW    CLINICAL HISTORY:  dyspnea;    COMPARISON:  January 9, 2025    FINDINGS:  Interval removal of bilateral chest tubes, with trace left apical pneumothorax.  Stable left greater than right basilar atelectasis/consolidation and left effusion.  Interval removal of right jugular central line.  The hilar and mediastinal contours and osseous structures are unchanged.  Impression: 1.  Trace residual left pneumothorax status post removal of left-sided chest tube.  Negative for pneumothorax with removal of right-sided chest tube.    2.  Interval removal of right jugular central line.    3.  Stable findings as noted above.    4.  Follow-up radiographs recommended.    Electronically signed by: Vadim Zurita MD  Date:    01/10/2025  Time:    07:02         Physical Exam  Vitals reviewed.   Constitutional:       Appearance: Normal appearance. He is obese.      Comments: Two mediastinal 1 left pleural and right pleural chest tube Paul draining clear urine we will L nasal cannula   HENT:      Head: Normocephalic and atraumatic.   Cardiovascular:      Rate and Rhythm: Normal rate and regular rhythm.      Pulses: Normal pulses.      Heart sounds:      Friction rub present.      Comments: Incision " clean and dry sternum is stable leg incision is healing  Pulmonary:      Effort: Pulmonary effort is normal.      Breath sounds: Normal breath sounds.   Abdominal:      Palpations: Abdomen is soft.   Musculoskeletal:         General: Normal range of motion.      Cervical back: Normal range of motion and neck supple.   Skin:     General: Skin is warm.      Capillary Refill: Capillary refill takes less than 2 seconds.   Neurological:      General: No focal deficit present.      Mental Status: He is alert and oriented to person, place, and time.   Psychiatric:         Mood and Affect: Mood normal.     Chest x-ray shows postsurgical changes with bilateral atelectasis    Assessment:     1. Abnormal nuclear stress test    2. Abnormal EKG    3. Coronary artery disease involving native coronary artery of native heart with unstable angina pectoris    4. S/P PTCA (percutaneous transluminal coronary angioplasty)    5. S/P coronary artery stent placement    6. PSVT (paroxysmal supraventricular tachycardia)    7. Abnormal Holter monitor finding    8. CAD, multiple vessel    9. Hypertension, unspecified type    10. Pre-operative cardiovascular examination    11. S/P CABG x 4    12. Post-operative state    13. A-fib    14. PAF (paroxysmal atrial fibrillation)          Plan   Postop day 3  status post CABG x4.  Neuro patient awake pain control as needed with Tylenol and narcotic  Cardiovascular aspirin Plavix beta-blocker  Hyperlipidemia statin  Respiratory aggressive pulmonary toilet incentive spirometry 2 L nasal cannula  Diet cardiac  Renal DC the Paul catheterization  Electrolyte replacement protocol  Hyperglycemia insulin sliding scale  Anemia multifactorial continue to follow  PTOT  Out of bed ambulate.  Transferred to telemetry  DC planning          Sidra Guardado MD  [facility]  [place]

## 2025-01-10 NOTE — NURSING
Pt aaox3. Resp even/unlabored. Vss. Denies any complaints. Updated regarding new room assignment and patient is going to notify family.  Moved to new room assignment via wheelchair on O2. Report called by Adelaide SCHWAB. Pt placed on portable tele monitor.

## 2025-01-10 NOTE — PROGRESS NOTES
Subjective:      Patient ID: Leonidas Bautista is a 74 y.o. male.    Chief Complaint: No chief complaint on file.    HPI:  74-year-old male with a history of smoking hypertension hyperlipidemia and family history of coronary artery disease had a positive stress test for his exertional angina patient had a left heart catheterization showed multivessel coronary artery disease the patient had a PCI of his LAD in 2003.  He has been admitted for management of his multivessel coronary artery disease.   Patient had a exertional angina and was admitted for ciritical stenosis  DATE OF PROCEDURE: 01/07/2025     ATTENDING SURGEON:  Sidra Guardado M.D.     ASSISTANT:      ANESTHESIOLOGIST:  Dr. Rodas     Pre-op Diagnosis: Coronary artery disease involving native coronary artery of native heart with unstable angina pectoris [I25.110]  Hypertension  Hyperlipidemia  COPD  Renal insufficiency  Peripheral arterial disease  Coronary artery disease     Post-op Diagnosis:  Same      Procedure(s):  CORONARY ARTERY BYPASS GRAFT (CABG)  SURGICAL PROCUREMENT, VEIN, ENDOSCOPIC  ECHOCARDIOGRAM,TRANSESOPHAGEAL  BLOCK, NERVE, INTERCOSTAL, 2 OR MORE      PROCEDURAL SUMMARY:   Coronary artery bypass graft, x 4  1) Pedicled LIMA to LAD  2) Saphenous vein graft to PDA  3) Saphenous vein graft to obtuse marginal  4) Saphenous vein graft to diagonal      Endoscopic vein harvesting from the left leg   Multiple intercostal nerve blocks with 0.25% Marcaine   Transesophageal echocardiogram findings:              1) Estimated ejection fraction 55 %              2) Ascending aorta without significant calcifications  01/08/2025 patient is extubated sitting up in a chair he is on minimal dose of epinephrine chest tube output moderate sanguinous the patient has Paul draining clear urine.  REBECCA drain in the leg draining serous drainage.  Awake alert pain well controlled overnight.      01/09/2025 patient is comfortable sitting up in a chair chest tube draining  minimal amount of serous sanguinous fluid the patient is in sinus rhythm with a occasional PVCs pain well controlled overnight appetite is back Paul draining clear urine      Review of patient's allergies indicates:  No Known Allergies    Past Medical History:   Diagnosis Date    Abnormal nuclear stress test 01/05/2025    Bilateral carotid artery stenosis 01/05/2025    CAD S/P percutaneous coronary angioplasty 2003    Stent mid LAD July 15, 2003    Carotid artery disease     Coronary artery disease involving native coronary artery of native heart without angina pectoris 01/05/2025    Diastolic dysfunction     Essential (primary) hypertension     Resistent    Graves disease     hyperthroidism    Hyperlipidemia     Hypertensive heart disease     Other hyperlipidemia 01/05/2025    Presence of drug coated stent in LAD coronary artery 01/05/2025    Primary hypertension 01/05/2025       No family history on file.    Social History     Socioeconomic History    Marital status:    Tobacco Use    Smoking status: Every Day     Types: Cigarettes    Smokeless tobacco: Never     Social Drivers of Health     Financial Resource Strain: Low Risk  (1/7/2025)    Overall Financial Resource Strain (CARDIA)     Difficulty of Paying Living Expenses: Not very hard   Food Insecurity: No Food Insecurity (1/7/2025)    Hunger Vital Sign     Worried About Running Out of Food in the Last Year: Never true     Ran Out of Food in the Last Year: Never true   Transportation Needs: No Transportation Needs (1/7/2025)    TRANSPORTATION NEEDS     Transportation : No   Stress: No Stress Concern Present (1/7/2025)    [employer] - Occupational Stress Questionnaire     Feeling of Stress : Not at all   Housing Stability: Low Risk  (1/7/2025)    Housing Stability Vital Sign     Unable to Pay for Housing in the Last Year: No     Homeless in the Last Year: No       Past Surgical History:   Procedure Laterality Date     ANGIOGRAPHY OF INTERNAL MAMMARY VESSEL Left 1/6/2025    Procedure: Angiogram Internal Mammary;  Surgeon: Erika Gomes MD;  Location: [facility] CATH LAB;  Service: Cardiology;  Laterality: Left;    ARTERIOGRAPHY OF SUBCLAVIAN ARTERY Left 1/6/2025    Procedure: ARTERIOGRAM, SUBCLAVIAN;  Surgeon: Erika Gomes MD;  Location: [facility] CATH LAB;  Service: Cardiology;  Laterality: Left;    CORONARY ARTERY BYPASS GRAFT (CABG) N/A 1/7/2025    Procedure: CORONARY ARTERY BYPASS GRAFT (CABG);  Surgeon: Sidra Guardado MD;  Location: [facility] OR;  Service: Cardiothoracic;  Laterality: N/A;  CABG x    ECHOCARDIOGRAM,TRANSESOPHAGEAL N/A 1/7/2025    Procedure: ECHOCARDIOGRAM,TRANSESOPHAGEAL;  Surgeon: Sidra Guardado MD;  Location: [facility] OR;  Service: Cardiothoracic;  Laterality: N/A;    ENDOSCOPIC HARVEST OF VEIN Right 1/7/2025    Procedure: SURGICAL PROCUREMENT, VEIN, ENDOSCOPIC;  Surgeon: Sidra Guardado MD;  Location: [facility] OR;  Service: Cardiothoracic;  Laterality: Right;    INJECTION OF ANESTHETIC AGENT AROUND MULTIPLE INTERCOSTAL NERVES N/A 1/7/2025    Procedure: BLOCK, NERVE, INTERCOSTAL, 2 OR MORE;  Surgeon: Sidra Guardado MD;  Location: [facility] OR;  Service: Cardiothoracic;  Laterality: N/A;  Para sternal nerve lock    LEFT HEART CATHETERIZATION Left 1/6/2025    Procedure: Left heart cath;  Surgeon: Erika Gomes MD;  Location: [facility] CATH LAB;  Service: Cardiology;  Laterality: Left;    ptca with Stent mid LAD in 2003 N/A        Review of Systems   Constitutional:  Negative for activity change and appetite change.   HENT:  Negative for dental problem, nosebleeds and sore throat.    Eyes:  Negative for discharge and visual disturbance.   Respiratory:  Negative for cough, chest tightness and stridor.    Cardiovascular:  Negative for leg swelling.   Gastrointestinal:  Negative for abdominal distention and abdominal pain.   Genitourinary:  Negative for difficulty urinating and dysuria.   Musculoskeletal:  Negative for  "arthralgias, back pain and joint swelling.   Allergic/Immunologic: Negative for environmental allergies.   Neurological:  Negative for dizziness, syncope and headaches.   Hematological:  Does not bruise/bleed easily.   Psychiatric/Behavioral:  Negative for behavioral problems.           Objective:   BP (!) 119/59   Pulse 70   Temp 99.9 °F (37.7 °C)   Resp (!) 21   Ht 5' 10" (1.778 m)   Wt 82.1 kg (181 lb)   SpO2 97%   BMI 25.97 kg/m²     X-Ray Chest AP Portable  Narrative: EXAMINATION:  XR CHEST AP PORTABLE    CLINICAL HISTORY:  Presence of aortocoronary bypass graft.Post-op;    COMPARISON:  January 8, 2025    FINDINGS:  EKG leads overlie the chest, which is slightly rotated to the left.  Interval removal of endotracheal tube.  Stable right jugular central line.  Stable bilateral chest tubes and large-bore mediastinal drain.  Stable left lower lobe infiltrate with adjacent effusion.  Stable atelectasis in the right lung base.  The lungs are free of new pulmonary opacities.  The hilar and mediastinal contours and osseous structures are unchanged.    Of note, there is a 10-15% left pneumothorax.  Impression: 1.  10-15% left pneumothorax in this patient who has a left-sided chest tube.  Is the chest tube clamped or to waterseal?    2.  Interval removal of endotracheal tube.  Stable lines and tubes otherwise.  Stable distribution of pulmonary opacities.    3.  Follow-up radiographs recommended.    Electronically signed by: Vadim Zurita MD  Date:    01/09/2025  Time:    06:43         Physical Exam  Vitals reviewed.   Constitutional:       Appearance: Normal appearance. He is obese.      Comments: Two mediastinal 1 left pleural and right pleural chest tube Paul draining clear urine we will L nasal cannula   HENT:      Head: Normocephalic and atraumatic.   Cardiovascular:      Rate and Rhythm: Normal rate and regular rhythm.      Pulses: Normal pulses.      Heart sounds:      Friction rub present.      Comments: " Incision clean and dry sternum is stable leg incision is healing  Pulmonary:      Effort: Pulmonary effort is normal.      Breath sounds: Normal breath sounds.   Abdominal:      Palpations: Abdomen is soft.   Musculoskeletal:         General: Normal range of motion.      Cervical back: Normal range of motion and neck supple.   Skin:     General: Skin is warm.      Capillary Refill: Capillary refill takes less than 2 seconds.   Neurological:      General: No focal deficit present.      Mental Status: He is alert and oriented to person, place, and time.   Psychiatric:         Mood and Affect: Mood normal.     Chest x-ray shows postsurgical changes with bilateral atelectasis    Assessment:     1. Abnormal nuclear stress test    2. Abnormal EKG    3. Coronary artery disease involving native coronary artery of native heart with unstable angina pectoris    4. S/P PTCA (percutaneous transluminal coronary angioplasty)    5. S/P coronary artery stent placement    6. PSVT (paroxysmal supraventricular tachycardia)    7. Abnormal Holter monitor finding    8. CAD, multiple vessel    9. Hypertension, unspecified type    10. Pre-operative cardiovascular examination    11. S/P CABG x 4    12. Post-operative state    13. A-fib          Plan   Postop day 2 status post CABG x4.  Neuro patient awake pain control as needed with Tylenol and narcotic  Cardiovascular aspirin Plavix beta-blocker  Hyperlipidemia statin  Respiratory aggressive pulmonary toilet incentive spirometry 2 L nasal cannula  Diet cardiac  Renal leave the Paul catheter  Electrolyte replacement protocol  Hyperglycemia insulin sliding scale  Anemia multifactorial continue to follow  Chest tubes can be discontinued.  PTOT  Out of bed ambulate.  Transferred to telemetry          Sidra Guardado MD  [facility]  [place]

## 2025-01-10 NOTE — PT/OT/SLP PROGRESS
Physical Therapy  Treatment    Leonidas Bautista   MRN: [id number]   Admitting Diagnosis: S/P CABG x 4    PT Received On: 01/10/25  PT Start Time: 0740     PT Stop Time: 0805    PT Total Time (min): 25 min       Billable Minutes:  Gait Training 15 and Therapeutic Activity 10    Treatment Type: Treatment  PT/PTA: PTA     Number of PTA visits since last PT visit: 1       General Precautions: Standard, fall, sternal  Orthopedic Precautions: N/A  Braces: N/A  Respiratory Status: Nasal cannula, flow 2 L/min         Subjective:  Communicated with patient's nurse, Cirilo, and completed Epic chart review prior to session.  Patient agreed to PT session.     Pain/Comfort  Pain Rating 1: 0/10    Objective:   Patient found with: oxygen, peripheral IV, telemetry    Patient found sitting on couch.     STS from couch > RW: SBA    200ft, 150ft x2 trials w/ RW CGA (x3 small LOB noted)    Stand pivot T/F to couch w/ RW CGA    Reviewed AROM TE to BLE including: hip flex/ext, knee flex/ext, ankle PF/DF  To be completed a minimum of 10 reps for each LE in order to promote return of function, strength and ROM.     Educated patient on importance of increased tolerance to upright position and direct impact on CV endurance and strength. Patient encouraged to sit up in chair/ EOB, for a minimum of 2 consecutive hours, 3x per day. Encouraged patient to perform AROM TE to BLE throughout the day within all available planes of motion. Re enforced importance of utilizing call light to meet needs in room and not attempt to get up without staff assistance. Patient verbalized understanding and agreed to comply.       AM-PAC 6 CLICK MOBILITY  How much help from another person does this patient currently need?   1 = Unable, Total/Dependent Assistance  2 = A lot, Maximum/Moderate Assistance  3 = A little, Minimum/Contact Guard/Supervision  4 = None, Modified [place]/Independent    Turning over in bed (including adjusting bedclothes, sheets and  blankets)?: 1 (NT)  Sitting down on and standing up from a chair with arms (e.g., wheelchair, bedside commode, etc.): 3  Moving from lying on back to sitting on the side of the bed?: 1 (NT)  Moving to and from a bed to a chair (including a wheelchair)?: 3  Need to walk in hospital room?: 3  Climbing 3-5 steps with a railing?: 1 (NT)  Basic Mobility Total Score: 12    AM-PAC Raw Score CMS G-Code Modifier Level of Impairment Assistance   6 % Total / Unable   7 - 9 CM 80 - 100% Maximal Assist   10 - 14 CL 60 - 80% Moderate Assist   15 - 19 CK 40 - 60% Moderate Assist   20 - 22 CJ 20 - 40% Minimal Assist   23 CI 1-20% SBA / CGA   24 CH 0% Independent/ Mod I     Patient left  seated on couch  with call button in reach.    Assessment:  Leonidas Bautista is a 74 y.o. male with a medical diagnosis of S/P CABG x 4 and presents with overall decline in functional mobility. Patient would continue to benefit from skilled PT to address functional limitations listed below in order to return to PLOF/decrease caregiver burden.     Rehab identified problem list/impairments: weakness, impaired endurance, impaired balance, decreased safety awareness, impaired cardiopulmonary response to activity    Rehab potential is fair.    Activity tolerance: Fair    Discharge recommendations: Low Intensity Therapy (24/7 SPV & A)      Barriers to discharge:      Equipment recommendations: walker, rolling     GOALS:   Multidisciplinary Problems       Physical Therapy Goals          Problem: Physical Therapy    Goal Priority Disciplines Outcome Interventions   Physical Therapy Goal     PT, PT/OT Progressing    Description: LTG'S TO BE MET IN 14 DAYS (1-22-25)  PT WILL REQUIRE SPV FOR BED MOBILITY  PT WILL REQUIRE SPV FOR BED<>CHAIR TF'S  PT WILL  FEET WITH OR WITHOUT RW AND SPV  PT WILL INC AMPAC SCORE BY 2 POINTS TO PROGRESS GROSS FUNC MOBILITY                         PLAN:    Patient to be seen 3 x/week to address the above listed  problems via gait training, therapeutic activities, therapeutic exercises  Plan of Care expires: 01/23/25  Plan of Care reviewed with: patient         01/10/2025

## 2025-01-10 NOTE — PLAN OF CARE
Problem: Adult Inpatient Plan of Care  Goal: Plan of Care Review  Outcome: Progressing  Goal: Patient-Specific Goal (Individualized)  Outcome: Progressing  Goal: Absence of Hospital-Acquired Illness or Injury  Outcome: Progressing  Goal: Optimal Comfort and Wellbeing  Outcome: Progressing  Goal: Readiness for Transition of Care  Outcome: Progressing     Problem: Infection  Goal: Absence of Infection Signs and Symptoms  Outcome: Progressing     Problem: Wound  Goal: Optimal Coping  Outcome: Progressing  Goal: Optimal Functional Ability  Outcome: Progressing  Goal: Absence of Infection Signs and Symptoms  Outcome: Progressing  Goal: Improved Oral Intake  Outcome: Progressing  Goal: Optimal Pain Control and Function  Outcome: Progressing  Goal: Skin Health and Integrity  Outcome: Progressing  Goal: Optimal Wound Healing  Outcome: Progressing     Problem: Skin Injury Risk Increased  Goal: Skin Health and Integrity  Outcome: Progressing     Problem: Fall Injury Risk  Goal: Absence of Fall and Fall-Related Injury  Outcome: Progressing     Problem: Cardiovascular Surgery  Goal: Improved Activity Tolerance  Outcome: Progressing  Goal: Optimal Coping with Heart Surgery  Outcome: Progressing  Goal: Absence of Bleeding  Outcome: Progressing  Goal: Effective Bowel Elimination  Outcome: Progressing  Goal: Effective Cardiac Function  Outcome: Progressing  Goal: Optimal Cerebral Tissue Perfusion  Outcome: Progressing  Goal: Fluid and Electrolyte Balance  Outcome: Progressing  Goal: Blood Glucose Level Within Targeted Range  Outcome: Progressing  Goal: Absence of Infection Signs and Symptoms  Outcome: Progressing  Goal: Anesthesia/Sedation Recovery  Outcome: Progressing  Goal: Acceptable Pain Control  Outcome: Progressing  Goal: Nausea and Vomiting Relief  Outcome: Progressing  Goal: Effective Urinary Elimination  Outcome: Progressing  Goal: Effective Oxygenation and Ventilation  Outcome: Progressing

## 2025-01-10 NOTE — PLAN OF CARE
01/10/25 1344   Rounds   Attendance Provider;   Discharge Plan [facility]   Why the patient remains in the hospital Requires continued medical care   Transition of Care Barriers None       Patient accepted by [facility] [place] for care upon DC.  Anticipates DC, this weekend, pending progression.     15 09 Patient and daughter agreeable to [facility] upon DC. ROMELIA selected [facility] via [facility].

## 2025-01-10 NOTE — ASSESSMENT & PLAN NOTE
-Recovering well post-CABG  -Wean pressor support as tolerated  -Continue ASA, Plavix, BB, CCB  -Needs statin on board  -IS usage and ambulation when able    1/9/25  -Stable CV wise  -Continue ASA, Plavix, BB, CCB  -Statin as tolerated  -IS usage and ambulation    1/10/25  -Currently in afib  -Continue ASA, Plavix, BB, CCB  -Amiodarone and AC as per CT rec's

## 2025-01-10 NOTE — PROGRESS NOTES
[facility] ([facility])  Cardiology  Progress Note    Patient Name: Leonidas Bautista  MRN: [id number]  Admission Date: 1/6/2025  Hospital Length of Stay: 2 days  Code Status: Full Code   Attending Physician: Sidra Guardado MD   Primary Care Physician: No primary care provider on file.  Expected Discharge Date:   Principal Problem:S/P CABG x 4    Subjective:   HPI:  Patient is a 74 y.o. male presents with H/O CAD HTN HLP TOBACCO USE PALPITATION NSVT BY HOLTER HAD AN ABNORMAL CARDIOLITE .WITH SIGNIFICANT DEFECT IN LAD DISTRIBUTION. HE WAS REFERED FOR CATH DY DR GOOD         [facility] Course:   1/7/25-Patient seen and examined today, sitting up in bed, s/p LHC yesterday which showed multivessel CAD. CABG planned today. CP free during exam. Admits to being anxious. Labs stable.    1/8/25-Patient seen and examined today, s/p CABG x 4,  post-op day # 1. Feels ok. Weak/tired. Reports incisional pain. On low dose Epi. Labs reviewed. Creatinine 2.3. H/H 9.5/29.1.    1/9/25-Patient seen and examined today, s/p CABG x 4 POD # 2. BP dropped earlier today. Feels well at time of exam. Pain improved, chest tubes removed. Labs reviewed. Creatinine 2.2. H/H 8.9/27.0.     1/10/25-Patient seen and examined today, s/p CABG x 4 POD #3. Recovering well. Pain controlled. Still in afib, HR controlled. Labs reviewed.         Review of Systems   Constitutional: Positive for malaise/fatigue.   HENT: Negative.     Eyes: Negative.    Cardiovascular:  Positive for chest pain (incisional).   Respiratory: Negative.     Endocrine: Negative.    Hematologic/Lymphatic: Negative.    Skin: Negative.    Musculoskeletal:  Positive for arthritis and joint pain.   Gastrointestinal: Negative.    Genitourinary: Negative.    Neurological: Negative.    Psychiatric/Behavioral: Negative.     Allergic/Immunologic: Negative.      Objective:     Vital Signs (Most Recent):  Temp: 98.3 °F (36.8 °C) (01/10/25 1203)  Pulse: 67 (01/10/25 1203)  Resp: 18 (01/10/25  1203)  BP: 132/69 (01/10/25 1203)  SpO2: (!) 91 % (01/10/25 1203) Vital Signs (24h Range):  Temp:  [97.6 °F (36.4 °C)-99.9 °F (37.7 °C)] 98.3 °F (36.8 °C)  Pulse:  [63-91] 67  Resp:  [16-45] 18  SpO2:  [91 %-97 %] 91 %  BP: ()/(53-69) 132/69     Weight: 83.8 kg (184 lb 11.9 oz)  Body mass index is 26.51 kg/m².     SpO2: (!) 91 %         Intake/Output Summary (Last 24 hours) at 1/10/2025 1325  Last data filed at 1/10/2025 0958  Gross per 24 hour   Intake 200 ml   Output 500 ml   Net -300 ml       Lines/Drains/Airways       Peripheral Intravenous Line  Duration                  Peripheral IV - Single Lumen 01/06/25 0923 20 G Anterior;Right Forearm 4 days         Peripheral IV - Single Lumen 01/09/25 1341 20 G Anterior;Right Upper Arm <1 day                       Physical Exam  Vitals and nursing note reviewed.   Constitutional:       Appearance: Normal appearance.   HENT:      Head: Normocephalic and atraumatic.   Eyes:      Pupils: Pupils are equal, round, and reactive to light.   Cardiovascular:      Rate and Rhythm: Normal rate. Rhythm irregularly irregular.      Heart sounds: S1 normal and S2 normal. No murmur heard.  Pulmonary:      Breath sounds: Rhonchi present.      Comments: Diminished BS at bases  Musculoskeletal:      Right lower leg: Edema present.      Left lower leg: Edema present.   Skin:     General: Skin is warm and dry.   Neurological:      General: No focal deficit present.      Mental Status: He is oriented to person, place, and time.   Psychiatric:         Mood and Affect: Mood normal.         Behavior: Behavior normal.         Thought Content: Thought content normal.            Significant Labs: CMP   Recent Labs   Lab 01/09/25  0447 01/10/25  0517    136   K 4.3 4.4    103   CO2 26 20*   * 106   BUN 36* 52*   CREATININE 2.2* 2.1*   CALCIUM 9.6 9.7   PROT 4.8* 5.6*   ALBUMIN 3.3* 3.2*   BILITOT 0.4 0.5   ALKPHOS 55 63   AST 45* 56*   ALT 11 16   ANIONGAP 6* 13   , CBC  "  Recent Labs   Lab 01/09/25  0447 01/10/25  0517   WBC 21.36* 18.06*   HGB 8.9* 9.2*   HCT 27.0* 28.2*   * 148*   , Troponin No results for input(s): "TROPONINIHS" in the last 48 hours., and All pertinent lab results from the last 24 hours have been reviewed.    Significant Imaging: Echocardiogram: Transthoracic echo (TTE) complete (Cupid Only): No results found for this or any previous visit., EKG: Reviewed, and X-Ray: CXR: X-Ray Chest 1 View (CXR):   Results for orders placed or performed during the hospital encounter of 01/06/25   X-Ray Chest 1 View    Narrative    EXAMINATION:  XR CHEST 1 VIEW    CLINICAL HISTORY:  dyspnea;    COMPARISON:  January 9, 2025    FINDINGS:  Interval removal of bilateral chest tubes, with trace left apical pneumothorax.  Stable left greater than right basilar atelectasis/consolidation and left effusion.  Interval removal of right jugular central line.  The hilar and mediastinal contours and osseous structures are unchanged.      Impression    1.  Trace residual left pneumothorax status post removal of left-sided chest tube.  Negative for pneumothorax with removal of right-sided chest tube.    2.  Interval removal of right jugular central line.    3.  Stable findings as noted above.    4.  Follow-up radiographs recommended.      Electronically signed by: Vadim Zurita MD  Date:    01/10/2025  Time:    07:02    and X-Ray Chest PA and Lateral (CXR): No results found for this visit on 01/06/25.  Assessment and Plan:   Patient with multivessel CAD, recovering s/p CABG. Still in afib, mgmt as per CTS. Continue current post-op care.    * S/P CABG x 4  -Recovering well post-CABG  -Wean pressor support as tolerated  -Continue ASA, Plavix, BB, CCB  -Needs statin on board  -IS usage and ambulation when able    1/9/25  -Stable CV wise  -Continue ASA, Plavix, BB, CCB  -Statin as tolerated  -IS usage and ambulation    1/10/25  -Currently in afib  -Continue ASA, Plavix, BB, CCB  -Amiodarone " and AC as per CT rec's    Stage 3b chronic kidney disease  -Per primary team    Currently smokes tobacco  -Cessation advised    Other hyperlipidemia  -Needs statin on board      Primary hypertension  -Continue home meds  -Titrate medications    Coronary artery disease involving native coronary artery of native heart without angina pectoris  -See plan under abnormal stress test    Abnormal nuclear stress test  -s/p [facility] which showed multivessel CAD  -CABG planned today  -Continue ASA, Imdur, ARB, Toprol, CCB    See plan under CABG        VTE Risk Mitigation (From admission, onward)           Ordered     enoxaparin injection 40 mg  Daily         01/09/25 2122     IP VTE HIGH RISK PATIENT  Once         01/09/25 2122     Place sequential compression device  Until discontinued         01/09/25 2123     Place sequential compression device  Until discontinued         01/07/25 1786                    Esmer Haddad PA-C  Cardiology  [facility] - Tele[facility] ([facility])

## 2025-01-11 VITALS
DIASTOLIC BLOOD PRESSURE: 78 MMHG | RESPIRATION RATE: 18 BRPM | WEIGHT: 184.75 LBS | OXYGEN SATURATION: 93 % | BODY MASS INDEX: 26.45 KG/M2 | HEART RATE: 80 BPM | SYSTOLIC BLOOD PRESSURE: 144 MMHG | TEMPERATURE: 99 F | HEIGHT: 70 IN

## 2025-01-11 LAB
BLD PROD TYP BPU: NORMAL
BLOOD UNIT EXPIRATION DATE: NORMAL
BLOOD UNIT TYPE CODE: 5100
BLOOD UNIT TYPE: NORMAL
CODING SYSTEM: NORMAL
CROSSMATCH INTERPRETATION: NORMAL
DISPENSE STATUS: NORMAL
MAGNESIUM SERPL-MCNC: 2.9 MG/DL (ref 1.6–2.6)
NUM UNITS TRANS PACKED RBC: NORMAL

## 2025-01-11 PROCEDURE — 94799 UNLISTED PULMONARY SVC/PX: CPT

## 2025-01-11 PROCEDURE — 97116 GAIT TRAINING THERAPY: CPT | Mod: CQ

## 2025-01-11 PROCEDURE — 25000003 PHARM REV CODE 250: Performed by: THORACIC SURGERY (CARDIOTHORACIC VASCULAR SURGERY)

## 2025-01-11 PROCEDURE — 99232 SBSQ HOSP IP/OBS MODERATE 35: CPT | Mod: 25,,, | Performed by: INTERNAL MEDICINE

## 2025-01-11 PROCEDURE — 83735 ASSAY OF MAGNESIUM: CPT | Performed by: THORACIC SURGERY (CARDIOTHORACIC VASCULAR SURGERY)

## 2025-01-11 PROCEDURE — 25000003 PHARM REV CODE 250: Performed by: INTERNAL MEDICINE

## 2025-01-11 PROCEDURE — 36415 COLL VENOUS BLD VENIPUNCTURE: CPT | Performed by: THORACIC SURGERY (CARDIOTHORACIC VASCULAR SURGERY)

## 2025-01-11 PROCEDURE — 99900035 HC TECH TIME PER 15 MIN (STAT)

## 2025-01-11 RX ORDER — CLOPIDOGREL BISULFATE 75 MG/1
75 TABLET ORAL DAILY
Qty: 30 TABLET | Refills: 11 | Status: SHIPPED | OUTPATIENT
Start: 2025-01-12 | End: 2026-01-12

## 2025-01-11 RX ORDER — AMIODARONE HYDROCHLORIDE 200 MG/1
200 TABLET ORAL DAILY
Qty: 30 TABLET | Refills: 11 | Status: SHIPPED | OUTPATIENT
Start: 2025-01-12 | End: 2026-01-12

## 2025-01-11 RX ORDER — ATORVASTATIN CALCIUM 40 MG/1
40 TABLET, FILM COATED ORAL NIGHTLY
Qty: 90 TABLET | Refills: 3 | Status: SHIPPED | OUTPATIENT
Start: 2025-01-11 | End: 2026-01-11

## 2025-01-11 RX ORDER — OXYCODONE HYDROCHLORIDE 5 MG/1
5 TABLET ORAL EVERY 4 HOURS PRN
Qty: 20 TABLET | Refills: 0 | Status: SHIPPED | OUTPATIENT
Start: 2025-01-11

## 2025-01-11 RX ADMIN — CLOPIDOGREL BISULFATE 75 MG: 75 TABLET ORAL at 09:01

## 2025-01-11 RX ADMIN — METHIMAZOLE 10 MG: 10 TABLET ORAL at 09:01

## 2025-01-11 RX ADMIN — DOCUSATE SODIUM 100 MG: 100 CAPSULE, LIQUID FILLED ORAL at 09:01

## 2025-01-11 RX ADMIN — FAMOTIDINE 20 MG: 20 TABLET ORAL at 09:01

## 2025-01-11 RX ADMIN — ASPIRIN 81 MG: 81 TABLET, COATED ORAL at 09:01

## 2025-01-11 RX ADMIN — APIXABAN 5 MG: 2.5 TABLET, FILM COATED ORAL at 09:01

## 2025-01-11 RX ADMIN — CHLORHEXIDINE GLUCONATE 0.12% ORAL RINSE 10 ML: 1.2 LIQUID ORAL at 09:01

## 2025-01-11 RX ADMIN — METOPROLOL TARTRATE 25 MG: 25 TABLET, FILM COATED ORAL at 09:01

## 2025-01-11 RX ADMIN — AMIODARONE HYDROCHLORIDE 200 MG: 200 TABLET ORAL at 09:01

## 2025-01-11 NOTE — PLAN OF CARE
PATIENT REPORTS HE IS SUPPOSE TO BE D/C'D TODAY.     BED MOB I    GOOD SITTING BALANCE TO MADAN GOWN     SIT<-->STAND SBA    AMBULATED WITH RW 2 X 200' WITH CONVERSATION SBA. O2 SATS 93% AND HR 74 AFTER WALKING    PATIENT AMBULATED FARTHER TODAY AND DEMONSTRATED NO CHANGE IN ZHANNA WITH DISTANCE AND NO SOB

## 2025-01-11 NOTE — DISCHARGE SUMMARY
[facility] - Tele[facility] (Hospital)  Discharge Note      Procedure(s) (LRB):  CORONARY ARTERY BYPASS GRAFT (CABG) (N/A)  SURGICAL PROCUREMENT, VEIN, ENDOSCOPIC (Right)  ECHOCARDIOGRAM,TRANSESOPHAGEAL (N/A)  BLOCK, NERVE, INTERCOSTAL, 2 OR MORE (N/A)HPI:  74-year-old male with a history of smoking hypertension hyperlipidemia and family history of coronary artery disease had a positive stress test for his exertional angina patient had a left heart catheterization showed multivessel coronary artery disease the patient had a PCI of his LAD in 2003.  He has been admitted for management of his multivessel coronary artery disease.   Patient had a exertional angina and was admitted for ciritical stenosis  DATE OF PROCEDURE: 01/07/2025     ATTENDING SURGEON:  Sidra Guardado M.D.     ASSISTANT:      ANESTHESIOLOGIST:  Dr. Rodas     Pre-op Diagnosis: Coronary artery disease involving native coronary artery of native heart with unstable angina pectoris [I25.110]  Hypertension  Hyperlipidemia  COPD  Renal insufficiency  Peripheral arterial disease  Coronary artery disease     Post-op Diagnosis:  Same      Procedure(s):  CORONARY ARTERY BYPASS GRAFT (CABG)  SURGICAL PROCUREMENT, VEIN, ENDOSCOPIC  ECHOCARDIOGRAM,TRANSESOPHAGEAL  BLOCK, NERVE, INTERCOSTAL, 2 OR MORE      PROCEDURAL SUMMARY:   Coronary artery bypass graft, x 4  1) Pedicled LIMA to LAD  2) Saphenous vein graft to PDA  3) Saphenous vein graft to obtuse marginal  4) Saphenous vein graft to diagonal      Endoscopic vein harvesting from the left leg   Multiple intercostal nerve blocks with 0.25% Marcaine   Transesophageal echocardiogram findings:              1) Estimated ejection fraction 55 %              2) Ascending aorta without significant calcifications  01/08/2025 patient is extubated sitting up in a chair he is on minimal dose of epinephrine chest tube output moderate sanguinous the patient has Paul draining clear urine.  REBECCA drain in the leg draining serous  drainage.  Awake alert pain well controlled overnight.        01/09/2025 patient is comfortable sitting up in a chair chest tube draining minimal amount of serous sanguinous fluid the patient is in sinus rhythm with a occasional PVCs pain well controlled overnight appetite is back Paul draining clear urine  01/10/2025 the patient is transferred to the telemetry comfortably sitting ambulating physical therapy the patient appetite is back sinus rhythm with PVCs pain well controlled.  01/11/2025 the patient awake comfortable not in distress sinus rhythm remained hemodynamically stable overnight incisions healing had 2 bowel movements pain well controlled ready for discharge      OUTCOME: Patient tolerated treatment/procedure well without complication and is now ready for discharge.    DISPOSITION: Home or Self Care    FINAL DIAGNOSIS:  S/P CABG x 4    FOLLOWUP: In clinic    DISCHARGE INSTRUCTIONS:    Discharge Procedure Orders   Ambulatory referral/consult to Home Health   Standing Status: Future   Referral Priority: Routine Referral Type: Home Health   Referral Reason: Specialty Services Required   Requested Specialty: Home Health Services   Number of Visits Requested: 1     Ambulatory referral/consult to Cardiac Rehab   Standing Status: Future   Referral Priority: Routine Referral Type: Consultation   Referral Reason: Specialty Services Required   Requested Specialty: Cardiac Rehabilitation   Number of Visits Requested: 1     Diet Cardiac     No dressing needed     Activity as tolerated         Clinical Reference Documents Added to Patient Instructions         Document    HEART HEALTHY DIET (ENGLISH)            TIME SPENT ON DISCHARGE: 45 minutes

## 2025-01-11 NOTE — SUBJECTIVE & OBJECTIVE
Review of Systems   Constitutional: Positive for malaise/fatigue.   HENT: Negative.     Eyes: Negative.    Cardiovascular: Negative.    Respiratory: Negative.     Endocrine: Negative.    Hematologic/Lymphatic: Negative.    Skin: Negative.    Musculoskeletal: Negative.    Gastrointestinal: Negative.    Genitourinary: Negative.    Neurological: Negative.    Psychiatric/Behavioral: Negative.     Allergic/Immunologic: Negative.      Objective:     Vital Signs (Most Recent):  Temp: 98.7 °F (37.1 °C) (01/11/25 0911)  Pulse: 80 (01/11/25 0911)  Resp: 18 (01/11/25 0911)  BP: (!) 144/78 (01/11/25 0911)  SpO2: (!) 93 % (01/11/25 0911) Vital Signs (24h Range):  Temp:  [97.4 °F (36.3 °C)-98.7 °F (37.1 °C)] 98.7 °F (37.1 °C)  Pulse:  [52-80] 80  Resp:  [17-20] 18  SpO2:  [89 %-97 %] 93 %  BP: (118-144)/(60-88) 144/78     Weight: 83.8 kg (184 lb 11.9 oz)  Body mass index is 26.51 kg/m².     SpO2: (!) 93 %         Intake/Output Summary (Last 24 hours) at 1/11/2025 1256  Last data filed at 1/11/2025 1200  Gross per 24 hour   Intake 340 ml   Output --   Net 340 ml       Lines/Drains/Airways       None                      Physical Exam  Vitals and nursing note reviewed.   Constitutional:       Appearance: He is well-developed.   HENT:      Head: Normocephalic.   Neck:      Thyroid: No thyromegaly.      Vascular: No carotid bruit or JVD.   Cardiovascular:      Rate and Rhythm: Normal rate. Rhythm irregularly irregular.      Pulses:           Radial pulses are 2+ on the right side and 2+ on the left side.      Heart sounds: S1 normal and S2 normal. Heart sounds not distant. No midsystolic click and no opening snap. No murmur heard.     No friction rub. No S3 or S4 sounds.   Pulmonary:      Effort: Pulmonary effort is normal.      Breath sounds: Normal breath sounds. No wheezing or rales.   Abdominal:      General: Bowel sounds are normal. There is no distension or abdominal bruit.      Palpations: Abdomen is soft.      Tenderness:  "There is no abdominal tenderness.   Musculoskeletal:      Cervical back: Neck supple.   Skin:     General: Skin is warm.   Neurological:      Mental Status: He is alert and oriented to person, place, and time.   Psychiatric:         Behavior: Behavior normal.            Significant Labs: ABG: No results for input(s): "PH", "PCO2", "HCO3", "POCSATURATED", "BE" in the last 48 hours., Blood Culture: No results for input(s): "LABBLOO" in the last 48 hours., BMP:   Recent Labs   Lab 01/10/25  0517 01/10/25  1654 01/11/25  0604     --   --      --   --    K 4.4  --   --      --   --    CO2 20*  --   --    BUN 52*  --   --    CREATININE 2.1*  --   --    CALCIUM 9.7  --   --    MG 3.1* 3.1* 2.9*   , CMP   Recent Labs   Lab 01/10/25  0517      K 4.4      CO2 20*      BUN 52*   CREATININE 2.1*   CALCIUM 9.7   PROT 5.6*   ALBUMIN 3.2*   BILITOT 0.5   ALKPHOS 63   AST 56*   ALT 16   ANIONGAP 13   , CBC   Recent Labs   Lab 01/10/25  0517   WBC 18.06*   HGB 9.2*   HCT 28.2*   *   , INR No results for input(s): "INR", "PROTIME" in the last 48 hours., Lipid Panel No results for input(s): "CHOL", "HDL", "LDLCALC", "TRIG", "CHOLHDL" in the last 48 hours., and Troponin No results for input(s): "TROPONINIHS" in the last 48 hours.    Significant Imaging: Echocardiogram: Transthoracic echo (TTE) complete (Cupid Only): No results found for this or any previous visit.  "

## 2025-01-11 NOTE — PLAN OF CARE
[facility] - Telemetry (Hospital)  Discharge Final Note    Primary Care Provider: No primary care provider on file.    Expected Discharge Date: 1/11/2025    Final Discharge Note (most recent)       Final Note - 01/11/25 1012          Final Note    Assessment Type Final Discharge Note     Anticipated Discharge Disposition Home or Self Care        Post-Acute Status    Home Health Status Set-up Complete/Auth obtained     Discharge Delays None known at this time                     Important Message from Medicare              Follow-up providers       Sidra Guardado MD   Specialty: Cardiothoracic Surgery    [address] [place] [address] Dr Truman [place] [id number]   Phone: [phone]       Next Steps: Follow up in 2 week(s)              After-discharge care                Home Medical Care       [facility]   Service: Home Health Services    [address] [place] [address] [place] [address]   Phone: [phone]

## 2025-01-11 NOTE — PROGRESS NOTES
[facility] - Telemetry ([facility])  Cardiology  Progress Note    Patient Name: Leonidas Bautista  MRN: [id number]  Admission Date: 1/6/2025  Hospital Length of Stay: 3 days  Code Status: Full Code   Attending Physician: Sidra Guardado MD   Primary Care Physician: No primary care provider on file.  Expected Discharge Date: 1/11/2025  Principal Problem:S/P CABG x 4    Subjective:     Hospital Course:   1/7/25-Patient seen and examined today, sitting up in bed, s/p LHC yesterday which showed multivessel CAD. CABG planned today. CP free during exam. Admits to being anxious. Labs stable.    1/8/25-Patient seen and examined today, s/p CABG x 4,  post-op day # 1. Feels ok. Weak/tired. Reports incisional pain. On low dose Epi. Labs reviewed. Creatinine 2.3. H/H 9.5/29.1.    1/9/25-Patient seen and examined today, s/p CABG x 4 POD # 2. BP dropped earlier today. Feels well at time of exam. Pain improved, chest tubes removed. Labs reviewed. Creatinine 2.2. H/H 8.9/27.0.     1/10/25-Patient seen and examined today, s/p CABG x 4 POD #3. Recovering well. Pain controlled. Still in afib, HR controlled. Labs reviewed.     1/11/25: pt seen this am.  Felt good, wants go go home.  Discussed w CV surgery.  In a fib, rate controlled.  Chart/labs reviewed.        Review of Systems   Constitutional: Positive for malaise/fatigue.   HENT: Negative.     Eyes: Negative.    Cardiovascular: Negative.    Respiratory: Negative.     Endocrine: Negative.    Hematologic/Lymphatic: Negative.    Skin: Negative.    Musculoskeletal: Negative.    Gastrointestinal: Negative.    Genitourinary: Negative.    Neurological: Negative.    Psychiatric/Behavioral: Negative.     Allergic/Immunologic: Negative.      Objective:     Vital Signs (Most Recent):  Temp: 98.7 °F (37.1 °C) (01/11/25 0911)  Pulse: 80 (01/11/25 0911)  Resp: 18 (01/11/25 0911)  BP: (!) 144/78 (01/11/25 0911)  SpO2: (!) 93 % (01/11/25 0911) Vital Signs (24h Range):  Temp:  [97.4 °F (36.3 °C)-98.7 °F  "(37.1 °C)] 98.7 °F (37.1 °C)  Pulse:  [52-80] 80  Resp:  [17-20] 18  SpO2:  [89 %-97 %] 93 %  BP: (118-144)/(60-88) 144/78     Weight: 83.8 kg (184 lb 11.9 oz)  Body mass index is 26.51 kg/m².     SpO2: (!) 93 %         Intake/Output Summary (Last 24 hours) at 1/11/2025 1256  Last data filed at 1/11/2025 1200  Gross per 24 hour   Intake 340 ml   Output --   Net 340 ml       Lines/Drains/Airways       None                      Physical Exam  Vitals and nursing note reviewed.   Constitutional:       Appearance: He is well-developed.   HENT:      Head: Normocephalic.   Neck:      Thyroid: No thyromegaly.      Vascular: No carotid bruit or JVD.   Cardiovascular:      Rate and Rhythm: Normal rate. Rhythm irregularly irregular.      Pulses:           Radial pulses are 2+ on the right side and 2+ on the left side.      Heart sounds: S1 normal and S2 normal. Heart sounds not distant. No midsystolic click and no opening snap. No murmur heard.     No friction rub. No S3 or S4 sounds.   Pulmonary:      Effort: Pulmonary effort is normal.      Breath sounds: Normal breath sounds. No wheezing or rales.   Abdominal:      General: Bowel sounds are normal. There is no distension or abdominal bruit.      Palpations: Abdomen is soft.      Tenderness: There is no abdominal tenderness.   Musculoskeletal:      Cervical back: Neck supple.   Skin:     General: Skin is warm.   Neurological:      Mental Status: He is alert and oriented to person, place, and time.   Psychiatric:         Behavior: Behavior normal.            Significant Labs: ABG: No results for input(s): "PH", "PCO2", "HCO3", "POCSATURATED", "BE" in the last 48 hours., Blood Culture: No results for input(s): "LABBLOO" in the last 48 hours., BMP:   Recent Labs   Lab 01/10/25  0517 01/10/25  1654 01/11/25  0604     --   --      --   --    K 4.4  --   --      --   --    CO2 20*  --   --    BUN 52*  --   --    CREATININE 2.1*  --   --    CALCIUM 9.7  --   --  " "  MG 3.1* 3.1* 2.9*   , CMP   Recent Labs   Lab 01/10/25  0517      K 4.4      CO2 20*      BUN 52*   CREATININE 2.1*   CALCIUM 9.7   PROT 5.6*   ALBUMIN 3.2*   BILITOT 0.5   ALKPHOS 63   AST 56*   ALT 16   ANIONGAP 13   , CBC   Recent Labs   Lab 01/10/25  0517   WBC 18.06*   HGB 9.2*   HCT 28.2*   *   , INR No results for input(s): "INR", "PROTIME" in the last 48 hours., Lipid Panel No results for input(s): "CHOL", "HDL", "LDLCALC", "TRIG", "CHOLHDL" in the last 48 hours., and Troponin No results for input(s): "TROPONINIHS" in the last 48 hours.    Significant Imaging: Echocardiogram: Transthoracic echo (TTE) complete (Cupid Only): No results found for this or any previous visit.  Assessment and Plan:         * S/P CABG x 4  -Recovering well post-CABG  -Wean pressor support as tolerated  -Continue ASA, Plavix, BB, CCB  -Needs statin on board  -IS usage and ambulation when able    1/9/25  -Stable CV wise  -Continue ASA, Plavix, BB, CCB  -Statin as tolerated  -IS usage and ambulation    1/10/25  -Currently in afib  -Continue ASA, Plavix, BB, CCB  -Amiodarone and AC as per CT rec's    Paroxysmal atrial fibrillation  Post op PAF.  On DOAC and Amiodarone.  Should resolve near future.    Will need close f/u w primary cardiologist asap after discharge for reevaluation.    Stage 3b chronic kidney disease  -Per primary team    Currently smokes tobacco  -Cessation advised    Other hyperlipidemia  -Needs statin on board      Primary hypertension  -Continue home meds  -Titrate medications    Coronary artery disease involving native coronary artery of native heart without angina pectoris  -See plan under abnormal stress test    Abnormal nuclear stress test  -s/p [facility] which showed multivessel CAD  -CABG planned today  -Continue ASA, Imdur, ARB, Toprol, CCB    See plan under CABG        VTE Risk Mitigation (From admission, onward)           Ordered     apixaban tablet 5 mg  2 times daily         01/10/25 " 1446     IP VTE HIGH RISK PATIENT  Once         01/09/25 2122     Place sequential compression device  Until discontinued         01/09/25 2123     Place sequential compression device  Until discontinued         01/07/25 1839                    Malcolm Saldivar MD  Cardiology  [facility] - Telemetry ([facility])

## 2025-01-11 NOTE — ASSESSMENT & PLAN NOTE
Post op PAF.  On DOAC and Amiodarone.  Should resolve near future.    Will need close f/u w primary cardiologist asap after discharge for reevaluation.

## 2025-01-11 NOTE — NURSING
Patient is ready for discharge. Patient stable alert and oriented. IVs removed. No complaints of pain. Discussed discharge plan. Reviewed medications and side effects, appointments, and answered questions with patient and family.  RX sent to patient preferred pharmacy.

## 2025-01-11 NOTE — PT/OT/SLP PROGRESS
Physical Therapy  Treatment    Leonidas Bautista   MRN: [id number]   Admitting Diagnosis: S/P CABG x 4       PT Start Time: 0925     PT Stop Time: 0950    PT Total Time (min): 25 min       Billable Minutes:  Gait Training 25    Treatment Type: Treatment  PT/PTA: PTA     Number of PTA visits since last PT visit: 2       General Precautions: Standard, fall, sternal  Orthopedic Precautions: N/A  Braces: N/A  Respiratory Status: Room air         Subjective:  Communicated with  prior to session.      Pain/Comfort  Pain Rating 1: 0/10  Pain Rating Post-Intervention 1: 0/10    Treatment and Education:    PATIENT REPORTS HE IS SUPPOSE TO BE D/C'D TODAY.     BED MOB I    GOOD SITTING BALANCE TO MADAN GOWN     SIT<-->STAND SBA    AMBULATED WITH RW 2 X 200' WITH CONVERSATION SBA. O2 SATS 93% AND HR 74 AFTER WALKING    PATIENT AMBULATED FARTHER TODAY AND DEMONSTRATED NO CHANGE IN ZHANNA WITH DISTANCE AND NO SOB     AM-PAC 6 CLICK MOBILITY  How much help from another person does this patient currently need?   1 = Unable, Total/Dependent Assistance  2 = A lot, Maximum/Moderate Assistance  3 = A little, Minimum/Contact Guard/Supervision  4 = None, Modified [place]/Independent    Turning over in bed (including adjusting bedclothes, sheets and blankets)?: 4  Sitting down on and standing up from a chair with arms (e.g., wheelchair, bedside commode, etc.):  (NA)  Moving from lying on back to sitting on the side of the bed?: 4  Moving to and from a bed to a chair (including a wheelchair)?:  (NA)  Need to walk in hospital room?: 4  Climbing 3-5 steps with a railing?:  (NA)    AM-PAC Raw Score CMS G-Code Modifier Level of Impairment Assistance   6 % Total / Unable   7 - 9 CM 80 - 100% Maximal Assist   10 - 14 CL 60 - 80% Moderate Assist   15 - 19 CK 40 - 60% Moderate Assist   20 - 22 CJ 20 - 40% Minimal Assist   23 CI 1-20% SBA / CGA   24 CH 0% Independent/ Mod I     Patient left supine with all lines intact and call button in  reach.    Assessment:  Leonidas Bautista is a 74 y.o. male with a medical diagnosis of S/P CABG x 4 and presents with .    Rehab identified problem list/impairments: weakness, decreased upper extremity function, decreased lower extremity function, impaired endurance, impaired self care skills, impaired functional mobility    Rehab potential is good.    Activity tolerance: Good    Discharge recommendations: Low Intensity Therapy      Barriers to discharge:      Equipment recommendations: walker, rolling     GOALS:   Multidisciplinary Problems       Physical Therapy Goals          Problem: Physical Therapy    Goal Priority Disciplines Outcome Interventions   Physical Therapy Goal     PT, PT/OT Progressing    Description: LTG'S TO BE MET IN 14 DAYS (1-22-25)  PT WILL REQUIRE SPV FOR BED MOBILITY  PT WILL REQUIRE SPV FOR BED<>CHAIR TF'S  PT WILL  FEET WITH OR WITHOUT RW AND SPV  PT WILL INC AMPAC SCORE BY 2 POINTS TO PROGRESS GROSS FUNC MOBILITY                         PLAN:    Patient to be seen 3 x/week to address the above listed problems via gait training, therapeutic activities, therapeutic exercises  Plan of Care expires: 01/23/25  Plan of Care reviewed with: patient         01/11/2025

## 2025-01-13 ENCOUNTER — TELEPHONE (OUTPATIENT)
Dept: CARDIAC REHAB | Facility: CLINIC | Age: 75
End: 2025-01-13
Payer: MEDICARE

## 2025-01-13 NOTE — TELEPHONE ENCOUNTER
Letter regarding Phase II cardiac rehab was sent to patient.  Encouraged pt to contact rehab facility in his area.  Grisel Ang RN  Cardiac Rehab Nurse

## 2025-01-14 ENCOUNTER — TELEPHONE (OUTPATIENT)
Dept: CARDIAC REHAB | Facility: HOSPITAL | Age: 75
End: 2025-01-14
Payer: MEDICARE

## 2025-01-14 ENCOUNTER — HOSPITAL ENCOUNTER (INPATIENT)
Facility: HOSPITAL | Age: 75
LOS: 2 days | Discharge: HOME HEALTH | DRG: 280 | End: 2025-01-17
Attending: STUDENT IN AN ORGANIZED HEALTH CARE EDUCATION/TRAINING PROGRAM | Admitting: STUDENT IN AN ORGANIZED HEALTH CARE EDUCATION/TRAINING PROGRAM
Payer: MEDICARE

## 2025-01-14 DIAGNOSIS — R55 SYNCOPE, UNSPECIFIED SYNCOPE TYPE: Primary | ICD-10-CM

## 2025-01-14 DIAGNOSIS — R55 SYNCOPE: ICD-10-CM
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
## 2025-01-14 DIAGNOSIS — R07.9 CHEST PAIN: ICD-10-CM

## 2025-01-14 DIAGNOSIS — S02.85XA CLOSED FRACTURE OF ORBITAL WALL, INITIAL ENCOUNTER: ICD-10-CM

## 2025-01-14 DIAGNOSIS — I21.4 NSTEMI (NON-ST ELEVATED MYOCARDIAL INFARCTION): ICD-10-CM

## 2025-01-14 LAB
ALBUMIN SERPL-MCNC: 2.8 G/DL (ref 3.4–4.8)
ALBUMIN/GLOB SERPL: 1.2 RATIO (ref 1.1–2)
ALLENS TEST: ABNORMAL
ALP SERPL-CCNC: 73 UNIT/L (ref 40–150)
ALT SERPL-CCNC: 25 UNIT/L (ref 0–55)
ANION GAP SERPL CALC-SCNC: 7 MEQ/L
APTT PPP: 36.2 SECONDS (ref 23.2–33.7)
AST SERPL-CCNC: 35 UNIT/L (ref 5–34)
BASOPHILS # BLD AUTO: 0.02 X10(3)/MCL
BASOPHILS NFR BLD AUTO: 0.1 %
BILIRUB SERPL-MCNC: 0.4 MG/DL
BNP BLD-MCNC: 859.5 PG/ML
BUN SERPL-MCNC: 34.6 MG/DL (ref 8.4–25.7)
CALCIUM SERPL-MCNC: 8.4 MG/DL (ref 8.8–10)
CHLORIDE SERPL-SCNC: 109 MMOL/L (ref 98–107)
CO2 SERPL-SCNC: 23 MMOL/L (ref 23–31)
CREAT SERPL-MCNC: 1.69 MG/DL (ref 0.72–1.25)
CREAT/UREA NIT SERPL: 20
EOSINOPHIL # BLD AUTO: 0.06 X10(3)/MCL (ref 0–0.9)
EOSINOPHIL NFR BLD AUTO: 0.4 %
ERYTHROCYTE [DISTWIDTH] IN BLOOD BY AUTOMATED COUNT: 14.5 % (ref 11.5–17)
ETHANOL SERPL-MCNC: <10 MG/DL
GFR SERPLBLD CREATININE-BSD FMLA CKD-EPI: 42 ML/MIN/1.73/M2
GLOBULIN SER-MCNC: 2.3 GM/DL (ref 2.4–3.5)
GLUCOSE SERPL-MCNC: 103 MG/DL (ref 70–110)
GLUCOSE SERPL-MCNC: 114 MG/DL (ref 82–115)
GLUCOSE SERPL-MCNC: 115 MG/DL (ref 70–110)
GLUCOSE SERPL-MCNC: 127 MG/DL (ref 70–110)
GLUCOSE SERPL-MCNC: 136 MG/DL (ref 70–110)
GLUCOSE SERPL-MCNC: 94 MG/DL (ref 70–110)
HCO3 UR-SCNC: 22.4 MMOL/L (ref 24–28)
HCO3 UR-SCNC: 23.5 MMOL/L (ref 24–28)
HCO3 UR-SCNC: 23.6 MMOL/L (ref 24–28)
HCO3 UR-SCNC: 24.3 MMOL/L (ref 24–28)
HCO3 UR-SCNC: 26.1 MMOL/L (ref 24–28)
HCT VFR BLD AUTO: 24.3 % (ref 42–52)
HCT VFR BLD CALC: 29 %PCV (ref 36–54)
HCT VFR BLD CALC: 30 %PCV (ref 36–54)
HCT VFR BLD CALC: 30 %PCV (ref 36–54)
HCT VFR BLD CALC: 35 %PCV (ref 36–54)
HCT VFR BLD CALC: 38 %PCV (ref 36–54)
HGB BLD-MCNC: 8 G/DL (ref 14–18)
IMM GRANULOCYTES # BLD AUTO: 0.08 X10(3)/MCL (ref 0–0.04)
IMM GRANULOCYTES NFR BLD AUTO: 0.6 %
INR PPP: 1.4
LACTATE SERPL-SCNC: 1.1 MMOL/L (ref 0.5–2.2)
LYMPHOCYTES # BLD AUTO: 1.23 X10(3)/MCL (ref 0.6–4.6)
LYMPHOCYTES NFR BLD AUTO: 8.8 %
MCH RBC QN AUTO: 34.5 PG (ref 27–31)
MCHC RBC AUTO-ENTMCNC: 32.9 G/DL (ref 33–36)
MCV RBC AUTO: 104.7 FL (ref 80–94)
MONOCYTES # BLD AUTO: 1.33 X10(3)/MCL (ref 0.1–1.3)
MONOCYTES NFR BLD AUTO: 9.5 %
NEUTROPHILS # BLD AUTO: 11.28 X10(3)/MCL (ref 2.1–9.2)
NEUTROPHILS NFR BLD AUTO: 80.6 %
NRBC BLD AUTO-RTO: 0.5 %
PCO2 BLDA: 39.8 MMHG (ref 35–45)
PCO2 BLDA: 42.3 MMHG (ref 35–45)
PCO2 BLDA: 44.5 MMHG (ref 35–45)
PCO2 BLDA: 44.6 MMHG (ref 35–45)
PCO2 BLDA: 48.5 MMHG (ref 35–45)
PH SMN: 7.29 [PH] (ref 7.35–7.45)
PH SMN: 7.31 [PH] (ref 7.35–7.45)
PH SMN: 7.33 [PH] (ref 7.35–7.45)
PH SMN: 7.39 [PH] (ref 7.35–7.45)
PH SMN: 7.4 [PH] (ref 7.35–7.45)
PLATELET # BLD AUTO: 327 X10(3)/MCL (ref 130–400)
PMV BLD AUTO: 9.5 FL (ref 7.4–10.4)
PO2 BLDA: 171 MMHG (ref 80–100)
PO2 BLDA: 407 MMHG (ref 80–100)
PO2 BLDA: 427 MMHG (ref 80–100)
PO2 BLDA: 593 MMHG (ref 80–100)
PO2 BLDA: 60 MMHG (ref 80–100)
POC ACTIVATED CLOTTING TIME K: 118 SEC (ref 74–137)
POC ACTIVATED CLOTTING TIME K: 147 SEC (ref 74–137)
POC ACTIVATED CLOTTING TIME K: 469 SEC (ref 74–137)
POC ACTIVATED CLOTTING TIME K: 527 SEC (ref 74–137)
POC ACTIVATED CLOTTING TIME K: 648 SEC (ref 74–137)
POC ACTIVATED CLOTTING TIME K: 775 SEC (ref 74–137)
POC BE: -1 MMOL/L
POC BE: -2 MMOL/L
POC BE: -3 MMOL/L
POC BE: -4 MMOL/L
POC BE: 1 MMOL/L
POC IONIZED CALCIUM: 1.2 MMOL/L (ref 1.06–1.42)
POC IONIZED CALCIUM: 1.21 MMOL/L (ref 1.06–1.42)
POC IONIZED CALCIUM: 1.24 MMOL/L (ref 1.06–1.42)
POC IONIZED CALCIUM: 1.27 MMOL/L (ref 1.06–1.42)
POC IONIZED CALCIUM: 1.71 MMOL/L (ref 1.06–1.42)
POC SATURATED O2: 100 % (ref 95–100)
POC SATURATED O2: 89 % (ref 95–100)
POTASSIUM BLD-SCNC: 3.7 MMOL/L (ref 3.5–5.1)
POTASSIUM BLD-SCNC: 4.1 MMOL/L (ref 3.5–5.1)
POTASSIUM BLD-SCNC: 4.7 MMOL/L (ref 3.5–5.1)
POTASSIUM BLD-SCNC: 4.9 MMOL/L (ref 3.5–5.1)
POTASSIUM BLD-SCNC: 5.2 MMOL/L (ref 3.5–5.1)
POTASSIUM SERPL-SCNC: 4.8 MMOL/L (ref 3.5–5.1)
PROT SERPL-MCNC: 5.1 GM/DL (ref 5.8–7.6)
PROTHROMBIN TIME: 17 SECONDS (ref 12.5–14.5)
RBC # BLD AUTO: 2.32 X10(6)/MCL (ref 4.7–6.1)
SAMPLE: ABNORMAL
SAMPLE: NORMAL
SITE: ABNORMAL
SODIUM BLD-SCNC: 135 MMOL/L (ref 136–145)
SODIUM BLD-SCNC: 137 MMOL/L (ref 136–145)
SODIUM BLD-SCNC: 139 MMOL/L (ref 136–145)
SODIUM SERPL-SCNC: 139 MMOL/L (ref 136–145)
TROPONIN I SERPL-MCNC: 0.2 NG/ML (ref 0–0.04)
WBC # BLD AUTO: 14 X10(3)/MCL (ref 4.5–11.5)

## 2025-01-14 PROCEDURE — 84484 ASSAY OF TROPONIN QUANT: CPT | Performed by: STUDENT IN AN ORGANIZED HEALTH CARE EDUCATION/TRAINING PROGRAM

## 2025-01-14 PROCEDURE — 83605 ASSAY OF LACTIC ACID: CPT | Performed by: STUDENT IN AN ORGANIZED HEALTH CARE EDUCATION/TRAINING PROGRAM

## 2025-01-14 PROCEDURE — 86900 BLOOD TYPING SEROLOGIC ABO: CPT | Performed by: STUDENT IN AN ORGANIZED HEALTH CARE EDUCATION/TRAINING PROGRAM

## 2025-01-14 PROCEDURE — 85610 PROTHROMBIN TIME: CPT | Performed by: STUDENT IN AN ORGANIZED HEALTH CARE EDUCATION/TRAINING PROGRAM

## 2025-01-14 PROCEDURE — 85730 THROMBOPLASTIN TIME PARTIAL: CPT | Performed by: STUDENT IN AN ORGANIZED HEALTH CARE EDUCATION/TRAINING PROGRAM

## 2025-01-14 PROCEDURE — 93005 ELECTROCARDIOGRAM TRACING: CPT

## 2025-01-14 PROCEDURE — 80053 COMPREHEN METABOLIC PANEL: CPT | Performed by: STUDENT IN AN ORGANIZED HEALTH CARE EDUCATION/TRAINING PROGRAM

## 2025-01-14 PROCEDURE — 99291 CRITICAL CARE FIRST HOUR: CPT

## 2025-01-14 PROCEDURE — 99223 1ST HOSP IP/OBS HIGH 75: CPT | Mod: GC,,, | Performed by: SURGERY

## 2025-01-14 PROCEDURE — 83880 ASSAY OF NATRIURETIC PEPTIDE: CPT | Performed by: STUDENT IN AN ORGANIZED HEALTH CARE EDUCATION/TRAINING PROGRAM

## 2025-01-14 PROCEDURE — 25000003 PHARM REV CODE 250: Performed by: STUDENT IN AN ORGANIZED HEALTH CARE EDUCATION/TRAINING PROGRAM

## 2025-01-14 PROCEDURE — 83735 ASSAY OF MAGNESIUM: CPT | Performed by: STUDENT IN AN ORGANIZED HEALTH CARE EDUCATION/TRAINING PROGRAM

## 2025-01-14 PROCEDURE — 85025 COMPLETE CBC W/AUTO DIFF WBC: CPT | Performed by: STUDENT IN AN ORGANIZED HEALTH CARE EDUCATION/TRAINING PROGRAM

## 2025-01-14 PROCEDURE — 93010 ELECTROCARDIOGRAM REPORT: CPT | Mod: ,,, | Performed by: INTERNAL MEDICINE

## 2025-01-14 PROCEDURE — 25500020 PHARM REV CODE 255: Performed by: STUDENT IN AN ORGANIZED HEALTH CARE EDUCATION/TRAINING PROGRAM

## 2025-01-14 PROCEDURE — G0390 TRAUMA RESPONS W/HOSP CRITI: HCPCS | Performed by: STUDENT IN AN ORGANIZED HEALTH CARE EDUCATION/TRAINING PROGRAM

## 2025-01-14 PROCEDURE — 82077 ASSAY SPEC XCP UR&BREATH IA: CPT | Performed by: STUDENT IN AN ORGANIZED HEALTH CARE EDUCATION/TRAINING PROGRAM

## 2025-01-14 RX ORDER — PROPARACAINE HYDROCHLORIDE 5 MG/ML
1 SOLUTION/ DROPS OPHTHALMIC
Status: COMPLETED | OUTPATIENT
Start: 2025-01-14 | End: 2025-01-14

## 2025-01-14 RX ADMIN — IOHEXOL 100 ML: 350 INJECTION, SOLUTION INTRAVENOUS at 11:01

## 2025-01-14 RX ADMIN — FLUORESCEIN SODIUM 1 EACH: 1 STRIP OPHTHALMIC at 11:01

## 2025-01-14 RX ADMIN — PROPARACAINE HYDROCHLORIDE 1 DROP: 5 SOLUTION/ DROPS OPHTHALMIC at 11:01

## 2025-01-14 NOTE — TELEPHONE ENCOUNTER
Pt called re: cardiac rehab referral. Pt states he is not able to attend in person, so [facility] recommended and referral faxed.     Pt also endorses that he believes medications prescribed postop and picked up from his pharmacy are causing symptoms of shortness of breath. Pt instructed to go to ER for serious side effects related to breathing. Pt requested I alert his physician as well.

## 2025-01-14 NOTE — Clinical Note
Diagnosis: Syncope, unspecified syncope type [7617299]   Future Attending Provider: REYES, THAIRY G [[id number]]   Admit to which facility:: [facility] [[id number]]   Reason for IP Medical Treatment  (Clinical interventions that can only be accomplished in the IP setting? ) :: syncope workup, diuresis   Plans for Post-Acute care--if anticipated (pick the single best option):: A. No post acute care anticipated at this time   Special Needs:: No Special Needs [1]

## 2025-01-15 LAB
ACCEPTIBLE SP GR UR QL: 1.02 (ref 1–1.03)
AMPHET UR QL SCN: NEGATIVE
ANION GAP SERPL CALC-SCNC: 6 MEQ/L
APICAL FOUR CHAMBER EJECTION FRACTION: 56 %
APICAL TWO CHAMBER EJECTION FRACTION: 56 %
AV INDEX (PROSTH): 0.94
AV MEAN GRADIENT: 6 MMHG
AV PEAK GRADIENT: 10.2 MMHG
AV VALVE AREA BY VELOCITY RATIO: 3.6 CM²
AV VALVE AREA: 3.6 CM²
AV VELOCITY RATIO: 0.94
BACTERIA #/AREA URNS AUTO: NORMAL /HPF
BARBITURATE SCN PRESENT UR: NEGATIVE
BASOPHILS # BLD AUTO: 0.02 X10(3)/MCL
BASOPHILS NFR BLD AUTO: 0.1 %
BENZODIAZ UR QL SCN: NEGATIVE
BILIRUB UR QL STRIP.AUTO: NEGATIVE
BSA FOR ECHO PROCEDURE: 1.95 M2
BUN SERPL-MCNC: 30.4 MG/DL (ref 8.4–25.7)
CALCIUM SERPL-MCNC: 8.5 MG/DL (ref 8.8–10)
CANNABINOIDS UR QL SCN: POSITIVE
CHLORIDE SERPL-SCNC: 109 MMOL/L (ref 98–107)
CLARITY UR: CLEAR
CO2 SERPL-SCNC: 26 MMOL/L (ref 23–31)
COCAINE UR QL SCN: NEGATIVE
COLOR UR AUTO: NORMAL
CREAT SERPL-MCNC: 1.82 MG/DL (ref 0.72–1.25)
CREAT/UREA NIT SERPL: 17
CV ECHO LV RWT: 0.54 CM
DOP CALC AO PEAK VEL: 1.6 M/S
DOP CALC AO VTI: 25.3 CM
DOP CALC LVOT AREA: 3.8 CM2
DOP CALC LVOT DIAMETER: 2.2 CM
DOP CALC LVOT PEAK VEL: 1.5 M/S
DOP CALC LVOT STROKE VOLUME: 90.8 CM3
DOP CALC MV VTI: 34.8 CM
DOP CALCLVOT PEAK VEL VTI: 23.9 CM
E WAVE DECELERATION TIME: 190 MSEC
E/A RATIO: 3.05
E/E' RATIO: 16.14 M/S
ECHO LV POSTERIOR WALL: 1.4 CM (ref 0.6–1.1)
EOSINOPHIL # BLD AUTO: 0.05 X10(3)/MCL (ref 0–0.9)
EOSINOPHIL NFR BLD AUTO: 0.4 %
ERYTHROCYTE [DISTWIDTH] IN BLOOD BY AUTOMATED COUNT: 14.6 % (ref 11.5–17)
FENTANYL UR QL SCN: NEGATIVE
FOLATE SERPL-MCNC: 15.5 NG/ML (ref 7–31.4)
FRACTIONAL SHORTENING: 32.7 % (ref 28–44)
GFR SERPLBLD CREATININE-BSD FMLA CKD-EPI: 38 ML/MIN/1.73/M2
GLUCOSE SERPL-MCNC: 111 MG/DL (ref 82–115)
GLUCOSE UR QL STRIP: NORMAL
GROUP & RH: NORMAL
HCT VFR BLD AUTO: 25.2 % (ref 42–52)
HGB BLD-MCNC: 8.3 G/DL (ref 14–18)
HGB UR QL STRIP: NEGATIVE
HR MV ECHO: 68 BPM
IMM GRANULOCYTES # BLD AUTO: 0.07 X10(3)/MCL (ref 0–0.04)
IMM GRANULOCYTES NFR BLD AUTO: 0.5 %
INDIRECT COOMBS: NORMAL
INTERVENTRICULAR SEPTUM: 1.5 CM (ref 0.6–1.1)
IRON SATN MFR SERPL: 19 % (ref 20–50)
IRON SERPL-MCNC: 32 UG/DL (ref 65–175)
KETONES UR QL STRIP: NEGATIVE
LEFT ATRIUM AREA SYSTOLIC (APICAL 4 CHAMBER): 16 CM2
LEFT ATRIUM SIZE: 3.7 CM
LEFT INTERNAL DIMENSION IN SYSTOLE: 3.5 CM (ref 2.1–4)
LEFT VENTRICLE DIASTOLIC VOLUME INDEX: 65.64 ML/M2
LEFT VENTRICLE DIASTOLIC VOLUME: 128 ML
LEFT VENTRICLE END DIASTOLIC VOLUME APICAL 2 CHAMBER: 152 ML
LEFT VENTRICLE END DIASTOLIC VOLUME APICAL 4 CHAMBER: 201 ML
LEFT VENTRICLE END SYSTOLIC VOLUME APICAL 4 CHAMBER: 33.6 ML
LEFT VENTRICLE MASS INDEX: 167.1 G/M2
LEFT VENTRICLE SYSTOLIC VOLUME INDEX: 25.9 ML/M2
LEFT VENTRICLE SYSTOLIC VOLUME: 50.5 ML
LEFT VENTRICULAR INTERNAL DIMENSION IN DIASTOLE: 5.2 CM (ref 3.5–6)
LEFT VENTRICULAR MASS: 325.8 G
LEUKOCYTE ESTERASE UR QL STRIP: NEGATIVE
LV LATERAL E/E' RATIO: 22.6 M/S
LV SEPTAL E/E' RATIO: 12.56 M/S
LVED V (TEICH): 128 ML
LVES V (TEICH): 50.5 ML
LVOT MG: 4 MMHG
LVOT MV: 0.93 CM/S
LYMPHOCYTES # BLD AUTO: 1.51 X10(3)/MCL (ref 0.6–4.6)
LYMPHOCYTES NFR BLD AUTO: 11.1 %
MAGNESIUM SERPL-MCNC: 2.1 MG/DL (ref 1.6–2.6)
MAGNESIUM SERPL-MCNC: 2.3 MG/DL (ref 1.6–2.6)
MCH RBC QN AUTO: 34.4 PG (ref 27–31)
MCHC RBC AUTO-ENTMCNC: 32.9 G/DL (ref 33–36)
MCV RBC AUTO: 104.6 FL (ref 80–94)
MDMA UR QL SCN: NEGATIVE
MONOCYTES # BLD AUTO: 1.21 X10(3)/MCL (ref 0.1–1.3)
MONOCYTES NFR BLD AUTO: 8.9 %
MV MEAN GRADIENT: 3 MMHG
MV PEAK A VEL: 0.37 M/S
MV PEAK E VEL: 1.13 M/S
MV PEAK GRADIENT: 8 MMHG
MV STENOSIS PRESSURE HALF TIME: 76 MS
MV VALVE AREA BY CONTINUITY EQUATION: 2.61 CM2
MV VALVE AREA P 1/2 METHOD: 2.89 CM2
NEUTROPHILS # BLD AUTO: 10.8 X10(3)/MCL (ref 2.1–9.2)
NEUTROPHILS NFR BLD AUTO: 79 %
NITRITE UR QL STRIP: NEGATIVE
NRBC BLD AUTO-RTO: 0.4 %
OHS LV EJECTION FRACTION SIMPSONS BIPLANE MOD: 56 %
OHS QRS DURATION: 102 MS
OHS QTC CALCULATION: 460 MS
OPIATES UR QL SCN: NEGATIVE
PCP UR QL: NEGATIVE
PH UR STRIP: 5.5 [PH]
PH UR: 5.5 [PH] (ref 3–11)
PISA TR MAX VEL: 1.97 M/S
PLATELET # BLD AUTO: 341 X10(3)/MCL (ref 130–400)
PMV BLD AUTO: 9.7 FL (ref 7.4–10.4)
POTASSIUM SERPL-SCNC: 4.3 MMOL/L (ref 3.5–5.1)
PROT UR QL STRIP: NEGATIVE
PV PEAK GRADIENT: 5 MMHG
PV PEAK VELOCITY: 1.08 M/S
RBC # BLD AUTO: 2.41 X10(6)/MCL (ref 4.7–6.1)
RBC #/AREA URNS AUTO: NORMAL /HPF
SODIUM SERPL-SCNC: 141 MMOL/L (ref 136–145)
SP GR UR STRIP.AUTO: 1.02 (ref 1–1.03)
SPECIMEN OUTDATE: NORMAL
SQUAMOUS #/AREA URNS LPF: NORMAL /HPF
TDI LATERAL: 0.05 M/S
TDI SEPTAL: 0.09 M/S
TDI: 0.07 M/S
TIBC SERPL-MCNC: 138 UG/DL (ref 60–240)
TIBC SERPL-MCNC: 170 UG/DL (ref 250–450)
TR MAX PG: 16 MMHG
TRANSFERRIN SERPL-MCNC: 155 MG/DL (ref 163–344)
TRICUSPID ANNULAR PLANE SYSTOLIC EXCURSION: 1.75 CM
TROPONIN I SERPL-MCNC: 0.17 NG/ML (ref 0–0.04)
TROPONIN I SERPL-MCNC: 0.18 NG/ML (ref 0–0.04)
TROPONIN I SERPL-MCNC: 0.2 NG/ML (ref 0–0.04)
UROBILINOGEN UR STRIP-ACNC: NORMAL
VIT B12 SERPL-MCNC: 1742 PG/ML (ref 213–816)
WBC # BLD AUTO: 13.66 X10(3)/MCL (ref 4.5–11.5)
WBC #/AREA URNS AUTO: NORMAL /HPF
Z-SCORE OF LEFT VENTRICULAR DIMENSION IN END DIASTOLE: -0.67
Z-SCORE OF LEFT VENTRICULAR DIMENSION IN END SYSTOLE: 0.18

## 2025-01-15 PROCEDURE — 81001 URINALYSIS AUTO W/SCOPE: CPT | Performed by: STUDENT IN AN ORGANIZED HEALTH CARE EDUCATION/TRAINING PROGRAM

## 2025-01-15 PROCEDURE — 85025 COMPLETE CBC W/AUTO DIFF WBC: CPT | Performed by: INTERNAL MEDICINE

## 2025-01-15 PROCEDURE — 25000003 PHARM REV CODE 250

## 2025-01-15 PROCEDURE — 63600175 PHARM REV CODE 636 W HCPCS: Performed by: INTERNAL MEDICINE

## 2025-01-15 PROCEDURE — 82746 ASSAY OF FOLIC ACID SERUM: CPT | Performed by: INTERNAL MEDICINE

## 2025-01-15 PROCEDURE — 80048 BASIC METABOLIC PNL TOTAL CA: CPT

## 2025-01-15 PROCEDURE — 82607 VITAMIN B-12: CPT | Performed by: INTERNAL MEDICINE

## 2025-01-15 PROCEDURE — 83550 IRON BINDING TEST: CPT | Performed by: INTERNAL MEDICINE

## 2025-01-15 PROCEDURE — 11000001 HC ACUTE MED/SURG PRIVATE ROOM

## 2025-01-15 PROCEDURE — 83735 ASSAY OF MAGNESIUM: CPT

## 2025-01-15 PROCEDURE — 25000003 PHARM REV CODE 250: Performed by: INTERNAL MEDICINE

## 2025-01-15 PROCEDURE — 84484 ASSAY OF TROPONIN QUANT: CPT

## 2025-01-15 PROCEDURE — 92610 EVALUATE SWALLOWING FUNCTION: CPT

## 2025-01-15 PROCEDURE — 63600175 PHARM REV CODE 636 W HCPCS: Performed by: STUDENT IN AN ORGANIZED HEALTH CARE EDUCATION/TRAINING PROGRAM

## 2025-01-15 PROCEDURE — 80307 DRUG TEST PRSMV CHEM ANLYZR: CPT | Performed by: STUDENT IN AN ORGANIZED HEALTH CARE EDUCATION/TRAINING PROGRAM

## 2025-01-15 PROCEDURE — 21400001 HC TELEMETRY ROOM

## 2025-01-15 PROCEDURE — 25500020 PHARM REV CODE 255: Performed by: STUDENT IN AN ORGANIZED HEALTH CARE EDUCATION/TRAINING PROGRAM

## 2025-01-15 PROCEDURE — 27000221 HC OXYGEN, UP TO 24 HOURS

## 2025-01-15 PROCEDURE — 99233 SBSQ HOSP IP/OBS HIGH 50: CPT | Mod: ,,, | Performed by: SURGERY

## 2025-01-15 RX ORDER — POLYETHYLENE GLYCOL 3350 17 G/17G
17 POWDER, FOR SOLUTION ORAL 2 TIMES DAILY PRN
Status: DISCONTINUED | OUTPATIENT
Start: 2025-01-15 | End: 2025-01-17 | Stop reason: HOSPADM

## 2025-01-15 RX ORDER — FUROSEMIDE 10 MG/ML
40 INJECTION INTRAMUSCULAR; INTRAVENOUS
Status: COMPLETED | OUTPATIENT
Start: 2025-01-15 | End: 2025-01-15

## 2025-01-15 RX ORDER — METOPROLOL SUCCINATE 50 MG/1
50 TABLET, EXTENDED RELEASE ORAL DAILY
Status: DISCONTINUED | OUTPATIENT
Start: 2025-01-15 | End: 2025-01-17 | Stop reason: HOSPADM

## 2025-01-15 RX ORDER — BISACODYL 10 MG/1
10 SUPPOSITORY RECTAL DAILY PRN
Status: DISCONTINUED | OUTPATIENT
Start: 2025-01-15 | End: 2025-01-17 | Stop reason: HOSPADM

## 2025-01-15 RX ORDER — IBUPROFEN 200 MG
24 TABLET ORAL
Status: DISCONTINUED | OUTPATIENT
Start: 2025-01-15 | End: 2025-01-17 | Stop reason: HOSPADM

## 2025-01-15 RX ORDER — ONDANSETRON HYDROCHLORIDE 2 MG/ML
4 INJECTION, SOLUTION INTRAVENOUS EVERY 4 HOURS PRN
Status: DISCONTINUED | OUTPATIENT
Start: 2025-01-15 | End: 2025-01-17 | Stop reason: HOSPADM

## 2025-01-15 RX ORDER — TALC
6 POWDER (GRAM) TOPICAL NIGHTLY PRN
Status: DISCONTINUED | OUTPATIENT
Start: 2025-01-15 | End: 2025-01-17 | Stop reason: HOSPADM

## 2025-01-15 RX ORDER — NIFEDIPINE 30 MG/1
30 TABLET, EXTENDED RELEASE ORAL NIGHTLY
Status: DISCONTINUED | OUTPATIENT
Start: 2025-01-15 | End: 2025-01-17 | Stop reason: HOSPADM

## 2025-01-15 RX ORDER — ALUMINUM HYDROXIDE, MAGNESIUM HYDROXIDE, AND SIMETHICONE 1200; 120; 1200 MG/30ML; MG/30ML; MG/30ML
30 SUSPENSION ORAL 4 TIMES DAILY PRN
Status: DISCONTINUED | OUTPATIENT
Start: 2025-01-15 | End: 2025-01-17 | Stop reason: HOSPADM

## 2025-01-15 RX ORDER — GLUCAGON 1 MG
1 KIT INJECTION
Status: DISCONTINUED | OUTPATIENT
Start: 2025-01-15 | End: 2025-01-17 | Stop reason: HOSPADM

## 2025-01-15 RX ORDER — METHIMAZOLE 10 MG/1
10 TABLET ORAL DAILY
Status: DISCONTINUED | OUTPATIENT
Start: 2025-01-15 | End: 2025-01-17 | Stop reason: HOSPADM

## 2025-01-15 RX ORDER — OXYCODONE HYDROCHLORIDE 5 MG/1
5 TABLET ORAL EVERY 4 HOURS PRN
Status: DISCONTINUED | OUTPATIENT
Start: 2025-01-15 | End: 2025-01-17 | Stop reason: HOSPADM

## 2025-01-15 RX ORDER — FUROSEMIDE 10 MG/ML
40 INJECTION INTRAMUSCULAR; INTRAVENOUS EVERY 12 HOURS
Status: DISCONTINUED | OUTPATIENT
Start: 2025-01-15 | End: 2025-01-16

## 2025-01-15 RX ORDER — SODIUM CHLORIDE 0.9 % (FLUSH) 0.9 %
10 SYRINGE (ML) INJECTION
Status: DISCONTINUED | OUTPATIENT
Start: 2025-01-15 | End: 2025-01-17 | Stop reason: HOSPADM

## 2025-01-15 RX ORDER — AMIODARONE HYDROCHLORIDE 200 MG/1
200 TABLET ORAL DAILY
Status: DISCONTINUED | OUTPATIENT
Start: 2025-01-15 | End: 2025-01-17 | Stop reason: HOSPADM

## 2025-01-15 RX ORDER — ACETAMINOPHEN 500 MG
1000 TABLET ORAL EVERY 6 HOURS PRN
Status: DISCONTINUED | OUTPATIENT
Start: 2025-01-15 | End: 2025-01-17 | Stop reason: HOSPADM

## 2025-01-15 RX ORDER — ATORVASTATIN CALCIUM 40 MG/1
40 TABLET, FILM COATED ORAL NIGHTLY
Status: DISCONTINUED | OUTPATIENT
Start: 2025-01-15 | End: 2025-01-17 | Stop reason: HOSPADM

## 2025-01-15 RX ORDER — IBUPROFEN 200 MG
16 TABLET ORAL
Status: DISCONTINUED | OUTPATIENT
Start: 2025-01-15 | End: 2025-01-17 | Stop reason: HOSPADM

## 2025-01-15 RX ADMIN — ATORVASTATIN CALCIUM 40 MG: 40 TABLET, FILM COATED ORAL at 08:01

## 2025-01-15 RX ADMIN — AMIODARONE HYDROCHLORIDE 200 MG: 200 TABLET ORAL at 09:01

## 2025-01-15 RX ADMIN — FUROSEMIDE 40 MG: 10 INJECTION, SOLUTION INTRAMUSCULAR; INTRAVENOUS at 01:01

## 2025-01-15 RX ADMIN — METHIMAZOLE 10 MG: 10 TABLET ORAL at 09:01

## 2025-01-15 RX ADMIN — ISOSORBIDE DINITRATE: 20 TABLET ORAL at 02:01

## 2025-01-15 RX ADMIN — ISOSORBIDE DINITRATE: 20 TABLET ORAL at 08:01

## 2025-01-15 RX ADMIN — Medication 6 MG: at 11:01

## 2025-01-15 RX ADMIN — METOPROLOL SUCCINATE 50 MG: 50 TABLET, EXTENDED RELEASE ORAL at 09:01

## 2025-01-15 RX ADMIN — NIFEDIPINE 30 MG: 30 TABLET, FILM COATED, EXTENDED RELEASE ORAL at 08:01

## 2025-01-15 RX ADMIN — FUROSEMIDE 40 MG: 10 INJECTION, SOLUTION INTRAMUSCULAR; INTRAVENOUS at 08:01

## 2025-01-15 RX ADMIN — PERFLUTREN 1.3 ML: 6.52 INJECTION, SUSPENSION INTRAVENOUS at 04:01

## 2025-01-15 RX ADMIN — FUROSEMIDE 40 MG: 10 INJECTION, SOLUTION INTRAMUSCULAR; INTRAVENOUS at 09:01

## 2025-01-15 NOTE — CONSULTS
PRS    left sided facial fractures are non-displaced and non-operative. Recommend elevation of head of bed and minimize nose blowing. Allow swelling to subside. Follow up not required. PRS will sign off.

## 2025-01-15 NOTE — CONSULTS
"   Trauma Surgery   Activation Note    Patient Name: Leonidas Bautista  MRN: [id number]   YOB: 1950  Date: 01/14/2025    LEVEL 2 TRAUMA     Subjective:   History source(s): patient, EMS, and chart   History of present illness: Patient is a 74 year old male on aspirin and plavix who presents to the ED following a near syncope event. Patient reports he was sitting up on the side of his bed when his heart began to race and he fell off the bed. He reports he does recall falling. EMS reports patient had four vessel CABG on Monday, 1/6/25. He reports he has had increasing shortness of breath over the last two days since discharge from the hospital. Family reported to EMS he was somewhat altered when they found him on the floor, grabbing at things under the bed. He is awake and oriented x 3 on arrival here.   I was present in the trauma bay at 2226, prior to patient arrival.     Primary Survey:  A Clear, intact   B Unlabored, clear lung sounds bilaterally   C No signs of uncontrolled external hemorrhage, 2+ left radial, doppler right radial, doppler DPs bilaterally   D GCS 15(E 4, V 5, M 6)    E exposed, log-rolled and examined (see below)   F See below     VITAL SIGNS: 24 HR MIN & MAX LAST   No data recorded      BP  Min: 112/61  Max: 124/78  112/61    Pulse  Min: 70  Max: 73  70    Resp  Min: 20  Max: 20  20    SpO2  Min: 93 %  Max: 95 %  95 %      HT: 5' 10" (177.8 cm)  WT: 77.1 kg (170 lb)  BMI: 24.4     FAST: deferred    Medications/transfusions received en-route:  ml  Medications/transfusions received in trauma bay: None    ROS: 12 point ROS negative except as stated in HPI    Allergies: NKDA  PMH:  HTN, HLD, COPD, renal insufficiency, PAD, CAD  PSH:  CABG  Social history: Unknown  Objective:   Secondary Survey:   General: Well developed, well nourished, no acute distress, AAOx3  Neuro: CNII-XII grossly intact  HEENT:  Normocephalic, swelling to left periorbital region, PERRL, reports some blurry " vision to left eye  CV:  RRR  Pulse: 2+ left radial, doppler right radial, doppler DPs bilaterally  Resp/chest:  Non-labored breathing, satting on room air, midsternal incision clean and dry, slight redness at site, previous CT sites to epigastric region clean and dry  GI:  Abdomen soft, non-tender, non-distended  :  Deferred   Rectal: Deferred. Intact gluteal squeeze noted.  Extremities: Moves all 4 spontaneously and purposefully, no obvious gross deformities. Postop dressing in place to RLE  Back/Spine: No bony TTP, no palpable step offs or deformities.  Cervical back: Normal. No tenderness.  Thoracic back: Normal. No tenderness.  Lumbar back: Normal. No tenderness.  Skin/wounds:  Warm, well perfused  Psych: Normal mood and affect.    Lab:  Labs Reviewed   COMPREHENSIVE METABOLIC PANEL - Abnormal       Result Value    Sodium 139      Potassium 4.8      Chloride 109 (*)     CO2 23      Glucose 114      Blood Urea Nitrogen 34.6 (*)     Creatinine 1.69 (*)     Calcium 8.4 (*)     Protein Total 5.1 (*)     Albumin 2.8 (*)     Globulin 2.3 (*)     Albumin/Globulin Ratio 1.2      Bilirubin Total 0.4      ALP 73      ALT 25      AST 35 (*)     eGFR 42      Anion Gap 7.0      BUN/Creatinine Ratio 20     PROTIME-INR - Abnormal    PT 17.0 (*)     INR 1.4 (*)    APTT - Abnormal    PTT 36.2 (*)    TROPONIN I - Abnormal    Troponin-I 0.203 (*)    B-TYPE NATRIURETIC PEPTIDE - Abnormal    Natriuretic Peptide 859.5 (*)    CBC WITH DIFFERENTIAL - Abnormal    WBC 14.00 (*)     RBC 2.32 (*)     Hgb 8.0 (*)     Hct 24.3 (*)     .7 (*)     MCH 34.5 (*)     MCHC 32.9 (*)     RDW 14.5      Platelet 327      MPV 9.5      Neut % 80.6      Lymph % 8.8      Mono % 9.5      Eos % 0.4      Basophil % 0.1      Imm Grans % 0.6      Neut # 11.28 (*)     Lymph # 1.23      Mono # 1.33 (*)     Eos # 0.06      Baso # 0.02      Imm Gran # 0.08 (*)     NRBC% 0.5     LACTIC ACID, PLASMA - Normal    Lactic Acid Level 1.1     ALCOHOL,MEDICAL  (ETHANOL) - Normal    Ethanol Level <10.0     CBC W/ AUTO DIFFERENTIAL    Narrative:     The following orders were created for panel order CBC auto differential.  Procedure                               Abnormality         Status                     ---------                               -----------         ------                     CBC with Differential[[id number]]       Abnormal            Final result                 Please view results for these tests on the individual orders.   URINALYSIS, REFLEX TO URINE CULTURE   DRUG SCREEN, URINE (BEAKER)   TYPE & SCREEN     I have reviewed all pertinent lab results within the past 24 hours.    Imaging:  Imaging Results              CT Chest Abdomen Pelvis With IV Contrast (XPD) NO Oral Contrast (Preliminary result)  Result time 01/14/25 23:33:09      Preliminary result by Lyle Armendariz Jr., MD (01/14/25 23:33:09)                   Narrative:    START OF REPORT:  Technique: CT Scan of the chest abdomen and pelvis was performed with intravenous contrast with axial as well as sagittal and, coronal images.    Dosage Information: Automated Exposure Control was utilized.    Comparison: None.    Clinical History: Trauma level 2, fall syncopal episode, +BT, head and neck trauma, swelling to left eye, reports CABG X1 week ago, best images due to motion artifact.    Findings:  Soft Tissues: Unremarkable.  Neck: The bilateral thyroid glands and isthmus are enlarged. Correlate with clinical and laboratory findings.  Mediastinum: The patient is post median sternotomy. There is a non-enhancing cystic lesion in the anterior mediastinal pre-vascular space measuring 1.6 x 3.7. This may be reflective of a small seroma.  Heart: Moderate cardiomegaly is seen.  Aorta: No aortic dissection or aneurysm is seen. Moderate aortic calcification is seen in the arch and descending thoracic aorta. A small penetrating atherosclerotic ulcer is identified in the aortic arch measuring 2.0 x 0.9  cm.  Pulmonary Arteries: Unremarkable.  Lungs: There are moderate sized bilateral pleural effusions with associated passive atelectasis of the adjacent lung parenchyma. There is intrafissural seepage of fluid and possible loculated components seen on the left.  Bony Structures:  Spine: Mild spondylolytic changes are seen in the thoracic spine.  Ribs: No rib fractures are identified.  Abdomen:  Abdominal Wall: No abdominal wall pathology is seen.  Liver: The liver appears unremarkable.  Trauma: No traumatic injury is identified.  Biliary System: No intrahepatic or extrahepatic biliary duct dilatation is seen.  Gallbladder: Multiple gallstones are seen in the gallbladder which otherwise appears unremarkable.  Pancreas: The pancreas appears unremarkable.  Spleen: The spleen appears unremarkable.  Adrenals: The adrenal glands appear unremarkable.  Kidneys: Multiple cysts are identified in the bilateral kidneys the largest of which is located in the right interpolar region measuring 1.5 cm (Series 2 Image 143). Otherwise the kidneys appear unremarkable with no stones masses or hydronephrosis identified.  Aorta: There is minimal calcification of the abdominal aorta and its branches.  IVC: Unremarkable.  Bowel:  Esophagus: The visualized distal esophagus appears unremarkable.  Stomach: The stomach appears unremarkable.  Duodenum: Unremarkable appearing duodenum.  Small Bowel: The small bowel appears unremarkable.  Colon: Nondistended. Multiple diverticula are seen throughout the colon. No associated inflammatory stranding or pericolonic fluid is seen to suggest diverticulitis.  Appendix: The appendix is not identified but no inflammatory changes are seen in the right lower quadrant to suggest appendicitis.  Peritoneum: No intraperitoneal free air or ascites is seen.    Pelvis: There is moderate subcutaneous edema in the lower lumbar and bilateral upper gluteal regions.  Bladder: The urinary bladder walls appear mildly  thickened which may be due to cystitis or chronic bladder outlet obsrtuction. A small diverticulum is seen along its right lateral wall (Series 2 Image 180).  Male:  Prostate gland: The prostate gland is moderately enlarged with its median lobe projecting into the bladder base.    Bony structures:  Dorsal Spine: There is mild to spondylosis of the visualized dorsal spine.  Bony Pelvis: The visualized bony structures of the pelvis appear unremarkable.      Impression:  1. There are moderate sized bilateral pleural effusions with associated passive atelectasis of the adjacent lung parenchyma. There is intrafissural seepage of fluid and possible loculated components seen on the left.  2. No acute traumatic intrathoracic pathology identified. No acute traumatic intraabdominal or pelvic solid organ or bowel pathology identified. Details and other findings as discussed above.                                         CT Cervical Spine Without Contrast (Preliminary result)  Result time 01/14/25 23:30:14      Preliminary result by Lyle Armendariz Jr., MD (01/14/25 23:30:14)                   Narrative:    START OF REPORT:  Technique: CT of the cervical spine was performed without intravenous contrast with axial as well as sagittal and coronal images.    Comparison: None.    Dosage Information: Automated exposure control was utilized.    Clinical history: Trauma level 2, fall syncopal episode, +BT, head and neck trauma, swelling to left eye, reports CABG X1 week ago, best images due to motion artifact.    Findings:  Lung apices: There is a left pleural effusion. The right lung apex is clear.  Spine:  Mineralization: Within normal limits.  Rotation: No significant rotation is seen.  Scoliosis: No significant scoliosis is seen.  Vertebral Fusion: No vertebral fusion is identified.  Listhesis: No significant listhesis is identified.  Lordosis: Mild degenerative straightening of the cervical lordosis is seen. This may be  positional or reflect an element of myospasm.  Osteophytes: Mild to moderate multilevel endplate osteophytes are seen.  Endplate Sclerosis: Mild endplate sclerosis is seen off the opposing endplates at C4-C5 and C5-C6.  Uncovertebral degenerative changes: Moderate right sided and left sided uncovertebral joint degenerative changes are seen at C4-C5 and C5-C6.  Facet degenerative changes: Mild left sided facet degenerative changes are seen C4-C5.  Calcifications: Small linear and globular calcifications are seen anterior to the C3-C4 through C6-C7. These may reflect ligamentous calcifications. Large linear calcification is also seen in the posterior soft tissue of the neck which may reflect nuchal ligament calcification.  Fractures: No acute fracture, dislocation, or subluxation is seen in the cervical spine.    Miscellaneous:  Mastoid air cells: The visualized mastoid air cells appear clear.  Soft Tissues: Unremarkable.      Impression:  1. No acute fracture, dislocation, or subluxation is seen in the cervical spine.  2. There is a left pleural effusion.  3. Degenerative changes and other details as above.                                         CT Head Without Contrast (Preliminary result)  Result time 01/14/25 23:21:32      Preliminary result by Lyle Armendariz Jr., MD (01/14/25 23:21:32)                   Narrative:    START OF REPORT:  Technique: CT of the head was performed without intravenous contrast with axial as well as coronal and sagittal images.    Comparison: None.    Dosage Information: Automated exposure control was utilized.    Clinical history: Trauma level 2, syncopal episode, head and neck trauma, swelling to left eye.    Findings:  Hemorrhage: No acute intracranial hemorrhage is seen.  CSF spaces: The ventricles, sulci and basal cisterns all appear moderately prominent consistent with global cerebral atrophy.  Brain parenchyma: Mild microvascular change is seen in portions of the  periventricular and deep white matter tracts. Note is made of chronic lacunar infarcts and mild gliotic changes in bilateral capsuloganglionic regions.  Cerebellum: Unremarkable.  Sella and skull base: The sella appears to be within normal limits for age.  Intracranial calcifications: Incidental note is made of bilateral choroid plexus calcification. Incidental note is made of some pineal region calcification. Incidental note is made of some calcification of the falx.  Calvarium: No acute linear or depressed skull fracture is seen.    Maxillofacial Structures:  Paranasal sinuses: Note is made of left maxillary hemosinus as discussed below.  Orbits: There is a fracture of the floor of the left orbit with possible injury to the left inferior rectus muscle seen on series 8 image 18-20 and high attenuation contents in the left maxillary sinus, consistent with hemosinus. There is left preorbital preseptal swelling with subcutaneous emphysema and associated air foci within the left orbit and left maxillary sinus.  Zygomatic arches: The zygomatic arches are intact and unremarkable.  Temporal bones and mastoids: The temporal bones and mastoids appear unremarkable.  TMJ: The mandibular condyles appear normally placed with respect to the mandibular fossa.      Impression:  1. There is a fracture of the floor of the left orbit with possible injury to the left inferior rectus muscle seen on series 8 image 18-20 and high attenuation contents in the left maxillary sinus, consistent with hemosinus. There is left preorbital preseptal swelling with subcutaneous emphysema and associated air foci within the left orbit and left maxillary sinus.  2. No acute intracranial process identified. Details and other findings as noted above.                                         X-Ray Chest 1 View (In process)                      X-Ray Pelvis Routine AP (In process)                   CXR: Bilateral pleural effusions by my read.   Pelvis XR:  No acute fracture by my read.     I have reviewed all pertinent imaging results/findings within the past 24 hours.    Assessment & Plan:   Patient is a 74 year old male on plavix and aspirin who had an episode of palpitations with syncope resulting in a fall from bed. He has swelling to his left periorbital region. Imaging significant for left orbital floor fracture. EOM intact. Vision 20/20 left, right and both eyes. Imaging also significant for moderate sized bilateral pleural effusions with concern for loculation on left. Labs reviewed with elevated WBC, troponin and BNP. Given recent CABG and syncopal event with elevated cardiac enzymes recommend admission to medicine service with cardiology consult. Facial trauma consulted. Trauma surgery will follow in consult awaiting recommendations from facial trauma. Anticipate non-operative management.     Mikaela Gonzales, SHIRACNP-BC, FNP-BC  Trauma Surgery  [facility]  C: [phone]

## 2025-01-15 NOTE — NURSING
Admission documentation completed by the admit nurse. Home med rec complete. Pt did not elect for meds to bed with [facility]. 4 Eyes and standing weight to be completed by the admitting floor nurse.

## 2025-01-15 NOTE — ED PROVIDER NOTES
Encounter Date: 1/14/2025    SCRIBE #1 NOTE: I, Zaynab Irene, am scribing for, and in the presence of,  Dani Ceja MD. I have scribed the following portions of the note - Other sections scribed: HPI, ROS, PE.       History   No chief complaint on file.    Patient is a 74 year old male with a history of CAD s/p CABG, HTN, and HLD that presents to the ED via EMS as a level 2 trauma following a fall. EMS reports the patient sat up in bed and started having palpitations. When he stood up, he fell forward and landed face first on the floor. Possible syncope. They report a nosebleed and worsening left periorbital swelling with blurry vision. They report his daughter went to check on him and he was altered for about 5 minutes. Patient cannot fully recall the fall. The patient had a quadruple bypass on Monday, 01/06 and was recently diagnosed with new onset atrial fibrillation. On Plavix. They report a couple episodes of hypotension and GCS 15 en route. Patient also complains of drainage from right lower extremity surgical sites. No neck pain.     The history is provided by the patient, the EMS personnel and medical records.     Review of patient's allergies indicates:  No Known Allergies  Past Medical History:   Diagnosis Date    Abnormal nuclear stress test 01/05/2025    Bilateral carotid artery stenosis 01/05/2025    CAD S/P percutaneous coronary angioplasty 2003    Stent mid LAD July 15, 2003    Carotid artery disease     Coronary artery disease involving native coronary artery of native heart without angina pectoris 01/05/2025    Diastolic dysfunction     Essential (primary) hypertension     Resistent    Graves disease     hyperthroidism    Hyperlipidemia     Hypertensive heart disease     Other hyperlipidemia 01/05/2025    Presence of drug coated stent in LAD coronary artery 01/05/2025    Primary hypertension 01/05/2025     Past Surgical History:   Procedure Laterality Date    ANGIOGRAPHY OF INTERNAL MAMMARY VESSEL  Left 1/6/2025    Procedure: Angiogram Internal Mammary;  Surgeon: Erika Gomes MD;  Location: [facility] CATH LAB;  Service: Cardiology;  Laterality: Left;    ARTERIOGRAPHY OF SUBCLAVIAN ARTERY Left 1/6/2025    Procedure: ARTERIOGRAM, SUBCLAVIAN;  Surgeon: Erika Gomes MD;  Location: [facility] CATH LAB;  Service: Cardiology;  Laterality: Left;    CORONARY ARTERY BYPASS GRAFT (CABG) N/A 1/7/2025    Procedure: CORONARY ARTERY BYPASS GRAFT (CABG);  Surgeon: Sidra Guardado MD;  Location: [facility] OR;  Service: Cardiothoracic;  Laterality: N/A;  CABG x    ECHOCARDIOGRAM,TRANSESOPHAGEAL N/A 1/7/2025    Procedure: ECHOCARDIOGRAM,TRANSESOPHAGEAL;  Surgeon: Sidra Guardado MD;  Location: [facility] OR;  Service: Cardiothoracic;  Laterality: N/A;    ENDOSCOPIC HARVEST OF VEIN Right 1/7/2025    Procedure: SURGICAL PROCUREMENT, VEIN, ENDOSCOPIC;  Surgeon: Sidra Guardado MD;  Location: [facility] OR;  Service: Cardiothoracic;  Laterality: Right;    INJECTION OF ANESTHETIC AGENT AROUND MULTIPLE INTERCOSTAL NERVES N/A 1/7/2025    Procedure: BLOCK, NERVE, INTERCOSTAL, 2 OR MORE;  Surgeon: Sidra Guardado MD;  Location: [facility] OR;  Service: Cardiothoracic;  Laterality: N/A;  Para sternal nerve lock    LEFT HEART CATHETERIZATION Left 1/6/2025    Procedure: Left heart cath;  Surgeon: Erika Gomes MD;  Location: [facility] CATH LAB;  Service: Cardiology;  Laterality: Left;    ptca with Stent mid LAD in 2003 N/A      No family history on file.  Social History     Tobacco Use    Smoking status: Every Day     Types: Cigarettes    Smokeless tobacco: Never     Review of Systems   Constitutional:  Negative for chills and fever.   HENT:  Positive for nosebleeds. Negative for congestion, rhinorrhea and sore throat.    Eyes:  Positive for visual disturbance.        Left periorbital swelling.    Respiratory:  Negative for cough and shortness of breath.    Cardiovascular:  Positive for palpitations. Negative for chest pain.   Gastrointestinal:  Negative  for abdominal pain, nausea and vomiting.   Genitourinary:  Negative for dysuria and hematuria.   Skin:         Drainage from right lower extremity surgical sites.    Neurological:         Possible syncope.    Psychiatric/Behavioral:  Positive for confusion.    All other systems reviewed and are negative.      Physical Exam     Initial Vitals   BP Pulse Resp Temp SpO2   01/14/25 2233 01/14/25 2233 01/14/25 2233 01/14/25 2305 01/14/25 2157   124/78 73 20 98.4 °F (36.9 °C) 96 %      MAP       --                Physical Exam    Nursing note and vitals reviewed.  Constitutional: Airway: Normal. Breathing: Normal. Circulation: Normal. He is not diaphoretic. Pulses:Radial palpable. No distress. Cervical collar in place.   Airway intact   HENT:   Head: Normocephalic.   Left periorbital ecchymosis.   OS 20/20 OD 20/20 OU 20/20  No fluorescein uptake on wood's lamp  IOP 19-20  EOMI   Eyes: Pupils: Normal pupils. EOM are normal. Pupils are equal, round, and reactive to light.   Pupils are 3 mm to 2 mm bilaterally.    Neck: Neck supple.   No midline cervical tenderness    Normal range of motion.  Cardiovascular:  Normal rate and regular rhythm.           Pulses:       Radial pulses are 2+ on the left side.        Dorsalis pedis pulses are 2+ on the right side and 2+ on the left side.   Dopplerable right radial pulse.    Pulmonary/Chest: Breath sounds normal. No respiratory distress.   Bilateral breath sounds. Midline sternotomy scar.    Abdominal: Abdomen is soft. He exhibits no distension. There is no abdominal tenderness. The pelvis is stable.   Musculoskeletal:         General: No edema. Normal range of motion.      Cervical back: Normal range of motion and neck supple. No deformity or bony tenderness. Normal.      Thoracic back: Normal. No deformity or bony tenderness.      Lumbar back: Normal. No deformity or bony tenderness.      Comments: No midline thoracic or lumbar tenderness. Surgical sites to right lower extremity.       Neurological: He is alert and oriented to person, place, and time. GCS score is 15. GCS eye subscore is 4. GCS verbal subscore is 5. GCS motor subscore is 6.   Skin: Skin is warm. No rash noted.         ED Course   Critical Care    Date/Time: 1/15/2025 4:16 PM    Performed by: Dani Ceja IV, MD  Authorized by: Dani Ceja IV, MD  Total critical care time (exclusive of procedural time) : 45 minutes  Critical care time was exclusive of separately billable procedures and treating other patients.  Critical care was necessary to treat or prevent imminent or life-threatening deterioration of the following conditions: cardiac failure.  Critical care was time spent personally by me on the following activities: development of treatment plan with patient or surrogate, blood draw for specimens, discussions with consultants, ordering and performing treatments and interventions, obtaining history from patient or surrogate, examination of patient, ordering and review of laboratory studies, pulse oximetry, re-evaluation of patient's condition, review of old charts and ordering and review of radiographic studies.        Labs Reviewed   COMPREHENSIVE METABOLIC PANEL - Abnormal       Result Value    Sodium 139      Potassium 4.8      Chloride 109 (*)     CO2 23      Glucose 114      Blood Urea Nitrogen 34.6 (*)     Creatinine 1.69 (*)     Calcium 8.4 (*)     Protein Total 5.1 (*)     Albumin 2.8 (*)     Globulin 2.3 (*)     Albumin/Globulin Ratio 1.2      Bilirubin Total 0.4      ALP 73      ALT 25      AST 35 (*)     eGFR 42      Anion Gap 7.0      BUN/Creatinine Ratio 20     PROTIME-INR - Abnormal    PT 17.0 (*)     INR 1.4 (*)    APTT - Abnormal    PTT 36.2 (*)    DRUG SCREEN, URINE (BEAKER) - Abnormal    Amphetamines, Urine Negative      Barbiturates, Urine Negative      Benzodiazepine, Urine Negative      Cannabinoids, Urine Positive (*)     Cocaine, Urine Negative      Fentanyl, Urine Negative      MDMA, Urine  Negative      Opiates, Urine Negative      Phencyclidine, Urine Negative      pH, Urine 5.5      Specific Gravity, Urine Auto 1.017      Narrative:     Cut off concentrations:    Amphetamines - 1000 ng/ml  Barbiturates - 200 ng/ml  Benzodiazepine - 200 ng/ml  Cannabinoids (THC) - 50 ng/ml  Cocaine - 300 ng/ml  Fentanyl - 1.0 ng/ml  MDMA - 500 ng/ml  Opiates - 300 ng/ml   Phencyclidine (PCP) - 25 ng/ml    Specimen submitted for drug analysis and tested for pH and specific gravity in order to evaluate sample integrity. Suspect tampering if specific gravity is <1.003 and/or pH is not within the range of 4.5 - 8.0  False negatives may result form substances such as bleach added to urine.  False positives may result for the presence of a substance with similar chemical structure to the drug or its metabolite.    This test provides only a PRELIMINARY analytical test result. A more specific alternate chemical method must be used in order to obtain a confirmed analytical result. Gas chromatography/mass spectrometry (GC/MS) is the preferred confirmatory method. Other chemical confirmation methods are available. Clinical consideration and professional judgement should be applied to any drug of abuse test result, particularly when preliminary positive results are used.    Positive results will be confirmed only at the physicians request. Unconfirmed screening results are to be used only for medical purposes (treatment).        TROPONIN I - Abnormal    Troponin-I 0.203 (*)    B-TYPE NATRIURETIC PEPTIDE - Abnormal    Natriuretic Peptide 859.5 (*)    CBC WITH DIFFERENTIAL - Abnormal    WBC 14.00 (*)     RBC 2.32 (*)     Hgb 8.0 (*)     Hct 24.3 (*)     .7 (*)     MCH 34.5 (*)     MCHC 32.9 (*)     RDW 14.5      Platelet 327      MPV 9.5      Neut % 80.6      Lymph % 8.8      Mono % 9.5      Eos % 0.4      Basophil % 0.1      Imm Grans % 0.6      Neut # 11.28 (*)     Lymph # 1.23      Mono # 1.33 (*)     Eos # 0.06       Baso # 0.02      Imm Gran # 0.08 (*)     NRBC% 0.5     TROPONIN I - Abnormal    Troponin-I 0.203 (*)    BASIC METABOLIC PANEL - Abnormal    Sodium 141      Potassium 4.3      Chloride 109 (*)     CO2 26      Glucose 111      Blood Urea Nitrogen 30.4 (*)     Creatinine 1.82 (*)     BUN/Creatinine Ratio 17      Calcium 8.5 (*)     Anion Gap 6.0      eGFR 38     TROPONIN I - Abnormal    Troponin-I 0.175 (*)    IRON AND TIBC - Abnormal    Iron Binding Capacity Unsaturated 138      Iron Level 32 (*)     Transferrin 155 (*)     Iron Binding Capacity Total 170 (*)     Iron Saturation 19 (*)    VITAMIN B12 - Abnormal    Vitamin B12 1,742 (*)    CBC WITH DIFFERENTIAL - Abnormal    WBC 13.66 (*)     RBC 2.41 (*)     Hgb 8.3 (*)     Hct 25.2 (*)     .6 (*)     MCH 34.4 (*)     MCHC 32.9 (*)     RDW 14.6      Platelet 341      MPV 9.7      Neut % 79.0      Lymph % 11.1      Mono % 8.9      Eos % 0.4      Basophil % 0.1      Imm Grans % 0.5      Neut # 10.80 (*)     Lymph # 1.51      Mono # 1.21      Eos # 0.05      Baso # 0.02      Imm Gran # 0.07 (*)     NRBC% 0.4     TROPONIN I - Abnormal    Troponin-I 0.184 (*)    LACTIC ACID, PLASMA - Normal    Lactic Acid Level 1.1     URINALYSIS, REFLEX TO URINE CULTURE - Normal    Color, UA Light-Yellow      Appearance, UA Clear      Specific Gravity, UA 1.017      pH, UA 5.5      Protein, UA Negative      Glucose, UA Normal      Ketones, UA Negative      Blood, UA Negative      Bilirubin, UA Negative      Urobilinogen, UA Normal      Nitrites, UA Negative      Leukocyte Esterase, UA Negative      RBC, UA 0-5      WBC, UA 0-5      Bacteria, UA None Seen      Squamous Epithelial Cells, UA None Seen     ALCOHOL,MEDICAL (ETHANOL) - Normal    Ethanol Level <10.0     MAGNESIUM - Normal    Magnesium Level 2.30     MAGNESIUM - Normal    Magnesium Level 2.10     FOLATE - Normal    Folate Level 15.5     CBC W/ AUTO DIFFERENTIAL    Narrative:     The following orders were created for panel  order CBC auto differential.  Procedure                               Abnormality         Status                     ---------                               -----------         ------                     CBC with Differential[[id number]]       Abnormal            Final result                 Please view results for these tests on the individual orders.   CBC W/ AUTO DIFFERENTIAL    Narrative:     The following orders were created for panel order CBC Auto Differential.  Procedure                               Abnormality         Status                     ---------                               -----------         ------                     CBC with Differential[[id number]]       Abnormal            Final result                 Please view results for these tests on the individual orders.   TYPE & SCREEN    Group & Rh O POS      Indirect Kristan GEL NEG      Specimen Outdate 01/17/2025 23:59          ECG Results              EKG 12-lead (Final result)        Collection Time Result Time QRS Duration OHS QTC Calculation    01/14/25 22:59:40 01/15/25 08:12:22 102 460                     Final result by Interface, Lab [facility] (01/15/25 08:12:30)                   Narrative:    Test Reason : W19.XXXA,    Vent. Rate :  73 BPM     Atrial Rate :    BPM     P-R Int :    ms          QRS Dur : 102 ms      QT Int : 418 ms       P-R-T Axes :      3  96 degrees    QTcB Int : 460 ms    Atrial fibrillation  Minimal voltage criteria for LVH, may be normal variant ( Kyle product )  Nonspecific T wave abnormality  Prolonged QT  Abnormal ECG    Confirmed by Rudy Lebron ([id number]) on 1/15/2025 8:12:20 AM    Referred By:            Confirmed By: Rudy Lebron                                  Imaging Results              CT Chest Abdomen Pelvis With IV Contrast (XPD) NO Oral Contrast (Final result)  Result time 01/15/25 08:31:56      Final result by Promise Hall MD (01/15/25 08:31:56)                   Impression:      1.  Postsurgical change of recent sternotomy with expected scant pneumomediastinum.  2. Small bilateral pleural effusions with mild loculation on the left.  3. The preliminary and final reports are concordant.      Electronically signed by: Promise Hall  Date:    01/15/2025  Time:    08:31               Narrative:    EXAMINATION:  CT CHEST ABDOMEN PELVIS WITH IV CONTRAST (XPD)    CLINICAL HISTORY:  Trauma;    TECHNIQUE:  Helical acquisition with axial, sagittal and coronal reformations obtained from the thoracic inlet to the pubic symphysis following the IV administration of contrast.    Automated tube current modulation, weight-based exposure dosing, and/or iterative reconstruction technique utilized to reach lowest reasonably achievable exposure rate.    DLP: 1330 mGy*cm    COMPARISON:  Chest radiograph 01/07/2025    FINDINGS:  CHEST:      BASE OF NECK: Mild thyromegaly.    HEART: Mild cardiomegaly.  There are postsurgical changes of recent coronary artery bypass grafting.  There are atherosclerotic calcifications of the native coronary arteries.    THORACIC VASCULATURE: Aortic atherosclerosis.  Ascending aorta measures 4.6 cm in diameter.  Calcified outpouching at the proximal descending aorta may be related to ductus bump, ulcer aneurysm...Pulmonary arteries enhance normally.    TRICIA/MEDIASTINUM: Changes compatible with recent sternotomy.  Scant pneumomediastinum.  No significant hematoma or collection.    AIRWAYS: Patent.    LUNGS/PLEURA: Small pleural effusions.  Left pleural effusion is mildly loculated.  Right pleural effusion layers dependently.  Mild atelectasis.    THORACIC SOFT TISSUES: Unremarkable.    ABDOMEN PELVIS:    LIVER: Normal attenuation. No appreciable focal hepatic lesion.    BILIARY: Cholelithiasis.    PANCREAS: No inflammatory change.    SPLEEN: Normal in size    ADRENALS: No mass.    KIDNEYS/URETERS: No hydronephrosis.  There are renal cortical cysts and too small to characterize renal  cortical hypodensities.    GI TRACT/MESENTERY:  No evidence of bowel obstruction or inflammation.     Colonic diverticulosis without acute inflammatory change.    PERITONEUM: No free fluid.No free air.    ABDOMINOPELVIC LYMPH NODES: No enlarged lymph nodes by size criteria.    ABDOMINOPELVIC VASCULATURE: Aortoiliac atherosclerosis.    BLADDER: Small diverticulum on the right.    REPRODUCTIVE ORGANS: Prostatomegaly    ABDOMINAL WALL: Unremarkable.    BONES: Postop recent sternotomy.  Degenerative change at the lumbar spine.                        Preliminary result by Lyle Armendariz Jr., MD (01/14/25 23:33:09)                   Impression:    1. There are moderate sized bilateral pleural effusions with associated passive atelectasis of the adjacent lung parenchyma. There is intrafissural seepage of fluid and possible loculated components seen on the left.  2. No acute traumatic intrathoracic pathology identified. No acute traumatic intraabdominal or pelvic solid organ or bowel pathology identified. Details and other findings as discussed above.               Narrative:    START OF REPORT:  Technique: CT Scan of the chest abdomen and pelvis was performed with intravenous contrast with axial as well as sagittal and, coronal images.    Dosage Information: Automated Exposure Control was utilized.    Comparison: None.    Clinical History: Trauma level 2, fall syncopal episode, +BT, head and neck trauma, swelling to left eye, reports CABG X1 week ago, best images due to motion artifact.    Findings:  Soft Tissues: Unremarkable.  Neck: The bilateral thyroid glands and isthmus are enlarged. Correlate with clinical and laboratory findings.  Mediastinum: The patient is post median sternotomy. There is a non-enhancing cystic lesion in the anterior mediastinal pre-vascular space measuring 1.6 x 3.7. This may be reflective of a small seroma.  Heart: Moderate cardiomegaly is seen.  Aorta: No aortic dissection or aneurysm is  seen. Moderate aortic calcification is seen in the arch and descending thoracic aorta. A small penetrating atherosclerotic ulcer is identified in the aortic arch measuring 2.0 x 0.9 cm.  Pulmonary Arteries: Unremarkable.  Lungs: There are moderate sized bilateral pleural effusions with associated passive atelectasis of the adjacent lung parenchyma. There is intrafissural seepage of fluid and possible loculated components seen on the left.  Bony Structures:  Spine: Mild spondylolytic changes are seen in the thoracic spine.  Ribs: No rib fractures are identified.  Abdomen:  Abdominal Wall: No abdominal wall pathology is seen.  Liver: The liver appears unremarkable.  Trauma: No traumatic injury is identified.  Biliary System: No intrahepatic or extrahepatic biliary duct dilatation is seen.  Gallbladder: Multiple gallstones are seen in the gallbladder which otherwise appears unremarkable.  Pancreas: The pancreas appears unremarkable.  Spleen: The spleen appears unremarkable.  Adrenals: The adrenal glands appear unremarkable.  Kidneys: Multiple cysts are identified in the bilateral kidneys the largest of which is located in the right interpolar region measuring 1.5 cm (Series 2 Image 143). Otherwise the kidneys appear unremarkable with no stones masses or hydronephrosis identified.  Aorta: There is minimal calcification of the abdominal aorta and its branches.  IVC: Unremarkable.  Bowel:  Esophagus: The visualized distal esophagus appears unremarkable.  Stomach: The stomach appears unremarkable.  Duodenum: Unremarkable appearing duodenum.  Small Bowel: The small bowel appears unremarkable.  Colon: Nondistended. Multiple diverticula are seen throughout the colon. No associated inflammatory stranding or pericolonic fluid is seen to suggest diverticulitis.  Appendix: The appendix is not identified but no inflammatory changes are seen in the right lower quadrant to suggest appendicitis.  Peritoneum: No intraperitoneal free  air or ascites is seen.    Pelvis: There is moderate subcutaneous edema in the lower lumbar and bilateral upper gluteal regions.  Bladder: The urinary bladder walls appear mildly thickened which may be due to cystitis or chronic bladder outlet obsrtuction. A small diverticulum is seen along its right lateral wall (Series 2 Image 180).  Male:  Prostate gland: The prostate gland is moderately enlarged with its median lobe projecting into the bladder base.    Bony structures:  Dorsal Spine: There is mild to spondylosis of the visualized dorsal spine.  Bony Pelvis: The visualized bony structures of the pelvis appear unremarkable.                                         CT Cervical Spine Without Contrast (Final result)  Result time 01/15/25 08:24:25      Final result by Velasquez Leggett MD (01/15/25 08:24:25)                   Impression:      No acute bony injury of the cervical spine.    No significant discrepancy with the preliminary report.      Electronically signed by: Velasquez Leggett  Date:    01/15/2025  Time:    08:24               Narrative:    EXAMINATION:  CT CERVICAL SPINE WITHOUT CONTRAST    CLINICAL HISTORY:  Trauma;    TECHNIQUE:  Helical acquisition through the cervical spine without IV contrast. Three plane reconstructions were made available for review. DLP 1329 mGycm. Automatic exposure control, adjustment of mA/kV or iterative reconstruction technique was used to reduce radiation.    COMPARISON:  None available.    FINDINGS:  No fractures identified. No subluxation. Craniocervical junction and C1-C2 relationship are normal. The odontoid is intact. There is limited evaluation of the soft tissues. No prevertebral soft tissue swelling. Ligamentous injury cannot be excluded with CT.  There are moderate degenerative changes.  There are vascular calcifications.  Chest discussed in a separate report.                        Preliminary result by Lyle Armendariz Jr., MD (01/14/25 23:30:14)                    Impression:    1. No acute fracture, dislocation, or subluxation is seen in the cervical spine.  2. There is a left pleural effusion.  3. Degenerative changes and other details as above.               Narrative:    START OF REPORT:  Technique: CT of the cervical spine was performed without intravenous contrast with axial as well as sagittal and coronal images.    Comparison: None.    Dosage Information: Automated exposure control was utilized.    Clinical history: Trauma level 2, fall syncopal episode, +BT, head and neck trauma, swelling to left eye, reports CABG X1 week ago, best images due to motion artifact.    Findings:  Lung apices: There is a left pleural effusion. The right lung apex is clear.  Spine:  Mineralization: Within normal limits.  Rotation: No significant rotation is seen.  Scoliosis: No significant scoliosis is seen.  Vertebral Fusion: No vertebral fusion is identified.  Listhesis: No significant listhesis is identified.  Lordosis: Mild degenerative straightening of the cervical lordosis is seen. This may be positional or reflect an element of myospasm.  Osteophytes: Mild to moderate multilevel endplate osteophytes are seen.  Endplate Sclerosis: Mild endplate sclerosis is seen off the opposing endplates at C4-C5 and C5-C6.  Uncovertebral degenerative changes: Moderate right sided and left sided uncovertebral joint degenerative changes are seen at C4-C5 and C5-C6.  Facet degenerative changes: Mild left sided facet degenerative changes are seen C4-C5.  Calcifications: Small linear and globular calcifications are seen anterior to the C3-C4 through C6-C7. These may reflect ligamentous calcifications. Large linear calcification is also seen in the posterior soft tissue of the neck which may reflect nuchal ligament calcification.  Fractures: No acute fracture, dislocation, or subluxation is seen in the cervical spine.    Miscellaneous:  Mastoid air cells: The visualized mastoid air cells appear  clear.  Soft Tissues: Unremarkable.                                         CT Head Without Contrast (Final result)  Result time 01/15/25 08:10:37      Final result by Jeovanny Hatch MD (01/15/25 08:10:37)                   Impression:    Impression:    1. There is a fracture of the floor of the left orbit with possible injury to the left inferior rectus muscle seen on series 8 image 18-20 and high attenuation contents in the left maxillary sinus, consistent with hemosinus. There is left preorbital preseptal swelling with subcutaneous emphysema and associated air foci within the left orbit and left maxillary sinus.    2. No acute intracranial process identified. Details and other findings as noted above.    No significant discrepancy with overnight report.      Electronically signed by: Jeovanny Hatch  Date:    01/15/2025  Time:    08:10               Narrative:      Technique: CT of the head was performed without intravenous contrast with axial as well as coronal and sagittal images.    Comparison: None.    Dosage Information: Automated exposure control was utilized.  DLP 1329    Clinical history: Trauma level 2, syncopal episode, head and neck trauma, swelling to left eye.    Findings:    Hemorrhage: No acute intracranial hemorrhage is seen.    CSF spaces: The ventricles, sulci and basal cisterns all appear moderately prominent consistent with global cerebral atrophy.    Brain parenchyma: Chronic microvascular change is seen in portions of the periventricular and deep white matter tracts. Note is made of chronic lacunar infarcts and mild gliotic changes in bilateral capsuloganglionic regions.    Cerebellum: Unremarkable.    Sella and skull base: The sella appears to be within normal limits for age.    Calvarium: No acute linear or depressed skull fracture is seen.    Maxillofacial Structures:    Paranasal sinuses: Note is made of left maxillary hemosinus as discussed below.    Orbits: There is a fracture of the  floor of the left orbit with possible injury to the left inferior rectus muscle seen on series 8 image 18-20 and high attenuation contents in the left maxillary sinus, consistent with hemosinus. There is left preorbital preseptal swelling with subcutaneous emphysema and associated air foci within the left orbit and left maxillary sinus.                        Preliminary result by Lyle Armendariz Jr., MD (01/14/25 23:21:32)                   Impression:    1. There is a fracture of the floor of the left orbit with possible injury to the left inferior rectus muscle seen on series 8 image 18-20 and high attenuation contents in the left maxillary sinus, consistent with hemosinus. There is left preorbital preseptal swelling with subcutaneous emphysema and associated air foci within the left orbit and left maxillary sinus.  2. No acute intracranial process identified. Details and other findings as noted above.               Narrative:    START OF REPORT:  Technique: CT of the head was performed without intravenous contrast with axial as well as coronal and sagittal images.    Comparison: None.    Dosage Information: Automated exposure control was utilized.    Clinical history: Trauma level 2, syncopal episode, head and neck trauma, swelling to left eye.    Findings:  Hemorrhage: No acute intracranial hemorrhage is seen.  CSF spaces: The ventricles, sulci and basal cisterns all appear moderately prominent consistent with global cerebral atrophy.  Brain parenchyma: Mild microvascular change is seen in portions of the periventricular and deep white matter tracts. Note is made of chronic lacunar infarcts and mild gliotic changes in bilateral capsuloganglionic regions.  Cerebellum: Unremarkable.  Sella and skull base: The sella appears to be within normal limits for age.  Intracranial calcifications: Incidental note is made of bilateral choroid plexus calcification. Incidental note is made of some pineal region  calcification. Incidental note is made of some calcification of the falx.  Calvarium: No acute linear or depressed skull fracture is seen.    Maxillofacial Structures:  Paranasal sinuses: Note is made of left maxillary hemosinus as discussed below.  Orbits: There is a fracture of the floor of the left orbit with possible injury to the left inferior rectus muscle seen on series 8 image 18-20 and high attenuation contents in the left maxillary sinus, consistent with hemosinus. There is left preorbital preseptal swelling with subcutaneous emphysema and associated air foci within the left orbit and left maxillary sinus.  Zygomatic arches: The zygomatic arches are intact and unremarkable.  Temporal bones and mastoids: The temporal bones and mastoids appear unremarkable.  TMJ: The mandibular condyles appear normally placed with respect to the mandibular fossa.                                         X-Ray Chest 1 View (Final result)  Result time 01/15/25 05:29:20      Final result by Enoc Andersen MD (01/15/25 05:29:20)                   Impression:    .  Cardiomegaly.    Changes of left-sided pleural effusion with increased left retrocardiac density and silhouetting of the left hemidiaphragm.    Changes suggestive of a degree of pulmonary vascular congestion and cardiac decompensation      Electronically signed by: Enoc Andersen  Date:    01/15/2025  Time:    05:29               Narrative:    EXAMINATION:  XR CHEST 1 VIEW    CPT 00860    CLINICAL HISTORY:  r/o bleeding or hemorrhage;    COMPARISON:  January 10, 2025    FINDINGS:  Examination reveals mediastinal silhouette to be within normal limits cardiac silhouette is enlarged there is increase in interstitial and pulmonary vascular markings indicating the presence of pulmonary vascular congestion and cardiac decompensation.    There is blunting of the left costophrenic angle indicating the presence of left-sided pleural effusion.  There is increased left  retrocardiac density and silhouetting of the left hemidiaphragm although these might be related to pleural fluid it may also represent an infiltrate/atelectasis.    No focal consolidative changes are otherwise identified                                       X-Ray Pelvis Routine AP (Final result)  Result time 01/15/25 07:18:03      Final result by Enoc Andersen MD (01/15/25 07:18:03)                   Impression:      Degenerative changes.      Electronically signed by: Enoc Andersen  Date:    01/15/2025  Time:    07:18               Narrative:    EXAMINATION:  XR PELVIS ROUTINE AP    CLINICAL HISTORY:  r/o bleeding or hemorrhage;    COMPARISON:  None.    FINDINGS:  No acute displaced fractures or dislocations.    There is some narrowing of the inferior medial aspect of both hip joints with more significant degenerative changes of the left sacroiliac joint and lumbosacral spine articular spaces otherwise preserved with smooth articular surfaces    No blastic or lytic lesions.    Soft tissues within normal limits.                                       Medications   sodium chloride 0.9% flush 10 mL (has no administration in time range)   melatonin tablet 6 mg (has no administration in time range)   ondansetron injection 4 mg (has no administration in time range)   polyethylene glycol packet 17 g (has no administration in time range)   bisacodyL suppository 10 mg (has no administration in time range)   acetaminophen tablet 1,000 mg (has no administration in time range)   aluminum-magnesium hydroxide-simethicone 200-200-20 mg/5 mL suspension 30 mL (has no administration in time range)   glucose chewable tablet 16 g (has no administration in time range)   glucose chewable tablet 24 g (has no administration in time range)   dextrose 50% injection 12.5 g (has no administration in time range)   dextrose 50% injection 25 g (has no administration in time range)   glucagon (human recombinant) injection 1 mg (has no  administration in time range)   amiodarone tablet 200 mg (200 mg Oral Given 1/15/25 0908)   atorvastatin tablet 40 mg (has no administration in time range)   hydrALAZINE 10 mg 1 tablet, hydrALAZINE 25 mg 1 tablet, isorsorbide dinitrate 20 mg 1 tablet combination ( Oral Given 1/15/25 1434)   methIMAzole tablet 10 mg (10 mg Oral Given 1/15/25 0908)   metoprolol succinate (TOPROL-XL) 24 hr tablet 50 mg (50 mg Oral Given 1/15/25 0908)   NIFEdipine 24 hr tablet 30 mg (has no administration in time range)   oxyCODONE immediate release tablet 5 mg (has no administration in time range)   furosemide injection 40 mg (40 mg Intravenous Given 1/15/25 0908)   iohexoL (OMNIPAQUE 350) injection 100 mL (100 mLs Intravenous Given 1/14/25 2301)   fluorescein ophthalmic strip 1 each (1 each Both Eyes Given by Provider 1/14/25 6839)   proparacaine 0.5 % ophthalmic solution 1 drop (1 drop Both Eyes Given by Provider 1/14/25 4126)   furosemide injection 40 mg (40 mg Intravenous Given 1/15/25 0123)   perflutren lipid microspheres injection 1.3 mL (1.3 mLs Intravenous Given 1/15/25 1600)     Medical Decision Making  74-year-old with a history of CAD recent CABG several weeks ago [place]  Presenting after syncope and fall left eye swelling noted  Reported some slight blurry vision but extraocular movements intact no fluorescein uptake normal intra-ocular pressures normal visual acuities  Has a minimally displaced orbital floor fracture no clinical signs of entrapment - this was cleared for a medicine admission by facial trauma  Trauma surgery recommended medicine admission  Discussed the case with [facility] given cardiac history high-risk will admit to hospitalist for syncope cardiology will follow along  Does have mild troponin elevation on initial workup which is likely secondary to recent CABG but will trend out per cardiology recommendations    Differential diagnosis includes, but is not limited to:  abrasion, contusion, fracture,  traumatic ICH, TBI, concussion, spinal injury, fracture, pneumothorax, hemothorax, intrathoracic injury, intraabdominal injury, hemorrhage, laceration, arrhythmia, dehydration, orthostatic episode, intracranial hemorrhage, seizure, anemia, infection, electrolyte derangement, drug use, hypoglycemic episode, ACS, PE, structural heart disease, AAA rupture, ectopic pregnancy, aortic dissection, CVA       Problems Addressed:  Closed fracture of orbital wall, initial encounter: acute illness or injury that poses a threat to life or bodily functions  NSTEMI (non-ST elevated myocardial infarction): acute illness or injury that poses a threat to life or bodily functions  Syncope, unspecified syncope type: acute illness or injury that poses a threat to life or bodily functions    Amount and/or Complexity of Data Reviewed  Independent Historian: EMS     Details: EMS reports the patient sat up in bed and started having palpitations. When he stood up, he fell forward and landed face first on the floor. Possible syncope. They report a nosebleed and worsening left periorbital swelling with blurry vision. They report his daughter went to check on him and he was altered for about 5 minutes. Patient cannot fully recall the fall. The patient had a quadruple bypass on Monday, 01/06 and was recently diagnosed with new onset atrial fibrillation. On Plavix. They report a couple episodes of hypotension and GCS 15 en route.  Labs: ordered.  Radiology: ordered and independent interpretation performed.     Details: CXR performed at 2237. No pneumothorax or hemothorax. No acute abnormalities.   Pelvis x-ray performed at 2238. Pelvis stable. No acute abnormalities.   ECG/medicine tests: ordered and independent interpretation performed.     Details: See ED course   Discussion of management or test interpretation with external provider(s): Discussed with [facility] - agree with admission  Discussed wit hospitalist - will admit   Discussed with trauma -  clear for medicine service     Risk  Prescription drug management.  Decision regarding hospitalization.    Critical Care  Total time providing critical care: 45 minutes            Scribe Attestation:   Scribe #1: I performed the above scribed service and the documentation accurately describes the services I performed. I attest to the accuracy of the note.    Attending Attestation:           Physician Attestation for Scribe:  Physician Attestation Statement for Scribe #1: I, Dani Ceja MD, reviewed documentation, as scribed by Zaynab Bonilla in my presence, and it is both accurate and complete.             ED Course as of 01/15/25 1616 Tue Jan 14, 2025   2304 EKG AFib 73 significant motion artifact limits ST segment elevation but no obvious ischemic changes slightly prolonged QT time is 10:59 p.m. [AC]   2347 IOP 17-18  No flurescein uptake L eye  EMOI [AC]   2354 O 20/20 OD 20/20 OU 20/20  [AC]   Wed Scar 15, 2025   0001 Robert [facility] - diurese, admit to medicine, trend troponins  [AC]   0023 Ailyn with hospitalist - accepts admission [AC]      ED Course User Index  [AC] Dani Ceja IV, MD                             Clinical Impression:  Final diagnoses:  [W19.XXXA] Fall  [R55] Syncope, unspecified syncope type (Primary)  [S02.85XA] Closed fracture of orbital wall, initial encounter  [I21.4] NSTEMI (non-ST elevated myocardial infarction)          ED Disposition Condition    Admit Stable                Dani Ceja IV, MD  01/15/25 1616       Dani Ceja IV, MD  01/15/25 1616

## 2025-01-15 NOTE — PLAN OF CARE
Will sign off. Injuries are non-op. Consideration given to formal CT face but given location of pain and bruising, this was adequately imaged on CT head. We are available as needed.

## 2025-01-15 NOTE — PROGRESS NOTES
TERTIARY TRAUMA SURVEY (TTS)    List Injuries Identified to Date:   1. Left orbital floor fracture, possible muscular injury    List Operations and Procedures:   1. None    Past Surgical History:   Procedure Laterality Date    ANGIOGRAPHY OF INTERNAL MAMMARY VESSEL Left 1/6/2025    Procedure: Angiogram Internal Mammary;  Surgeon: Erika Gomes MD;  Location: [facility] CATH LAB;  Service: Cardiology;  Laterality: Left;    ARTERIOGRAPHY OF SUBCLAVIAN ARTERY Left 1/6/2025    Procedure: ARTERIOGRAM, SUBCLAVIAN;  Surgeon: Erika Gomes MD;  Location: [facility] CATH LAB;  Service: Cardiology;  Laterality: Left;    CORONARY ARTERY BYPASS GRAFT (CABG) N/A 1/7/2025    Procedure: CORONARY ARTERY BYPASS GRAFT (CABG);  Surgeon: Sidra Guardado MD;  Location: [facility] OR;  Service: Cardiothoracic;  Laterality: N/A;  CABG x    ECHOCARDIOGRAM,TRANSESOPHAGEAL N/A 1/7/2025    Procedure: ECHOCARDIOGRAM,TRANSESOPHAGEAL;  Surgeon: Sidra Guardado MD;  Location: [facility] OR;  Service: Cardiothoracic;  Laterality: N/A;    ENDOSCOPIC HARVEST OF VEIN Right 1/7/2025    Procedure: SURGICAL PROCUREMENT, VEIN, ENDOSCOPIC;  Surgeon: Sidra Guardado MD;  Location: [facility] OR;  Service: Cardiothoracic;  Laterality: Right;    INJECTION OF ANESTHETIC AGENT AROUND MULTIPLE INTERCOSTAL NERVES N/A 1/7/2025    Procedure: BLOCK, NERVE, INTERCOSTAL, 2 OR MORE;  Surgeon: Sidra Guardado MD;  Location: [facility] OR;  Service: Cardiothoracic;  Laterality: N/A;  Para sternal nerve lock    LEFT HEART CATHETERIZATION Left 1/6/2025    Procedure: Left heart cath;  Surgeon: Erika Gomes MD;  Location: [facility] CATH LAB;  Service: Cardiology;  Laterality: Left;    ptca with Stent mid LAD in 2003 N/A        Incidental findings:   1. Bilateral pleural effusions  2.  Elevated troponin   Three.  Renal cyst   4. And large thyroid  5. Seroma in the chest     Past Medical History:   1. Significant recent past medical history including coronary artery disease, hypertension,  hyperlipidemia, COPD, renal insufficiency, peripheral arterial disease, recent cardiac surgery.    Active Ambulatory Problems     Diagnosis Date Noted    Abnormal nuclear stress test 01/05/2025    Coronary artery disease involving native coronary artery of native heart without angina pectoris 01/05/2025    Primary hypertension 01/05/2025    Other hyperlipidemia 01/05/2025    Bilateral carotid artery stenosis 01/05/2025    Presence of drug coated stent in LAD coronary artery 01/05/2025    CAD, multiple vessel 01/07/2025    S/P CABG x 4 01/07/2025    Currently smokes tobacco 01/07/2025    Stage 3b chronic kidney disease 01/07/2025    Graves' disease 01/07/2025    On mechanically assisted ventilation 01/07/2025    Paroxysmal atrial fibrillation 01/09/2025     Resolved Ambulatory Problems     Diagnosis Date Noted    No Resolved Ambulatory Problems     Past Medical History:   Diagnosis Date    CAD S/P percutaneous coronary angioplasty 2003    Carotid artery disease     Diastolic dysfunction     Essential (primary) hypertension     Graves disease     Hyperlipidemia     Hypertensive heart disease      Past Medical History:   Diagnosis Date    Abnormal nuclear stress test 01/05/2025    Bilateral carotid artery stenosis 01/05/2025    CAD S/P percutaneous coronary angioplasty 2003    Stent mid LAD July 15, 2003    Carotid artery disease     Coronary artery disease involving native coronary artery of native heart without angina pectoris 01/05/2025    Diastolic dysfunction     Essential (primary) hypertension     Resistent    Graves disease     hyperthroidism    Hyperlipidemia     Hypertensive heart disease     Other hyperlipidemia 01/05/2025    Presence of drug coated stent in LAD coronary artery 01/05/2025    Primary hypertension 01/05/2025       Tertiary Physical Exam:     Physical Exam  Constitutional:       Appearance: Normal appearance.   HENT:      Head: Normocephalic and atraumatic.      Nose: Nose normal.   Eyes:       Pupils: Pupils are equal, round, and reactive to light.   Cardiovascular:      Rate and Rhythm: Normal rate.      Pulses: Normal pulses.      Comments: Normal peripheral pulses  Pulmonary:      Effort: Pulmonary effort is normal. No respiratory distress.   Chest:      Chest wall: No tenderness.   Abdominal:      General: Abdomen is flat. Bowel sounds are normal. There is no distension.      Palpations: Abdomen is soft.      Tenderness: There is no abdominal tenderness.   Musculoskeletal:         General: No swelling, tenderness, deformity or signs of injury.      Cervical back: Normal range of motion and neck supple. No tenderness.   Skin:     General: Skin is warm and dry.      Capillary Refill: Capillary refill takes less than 2 seconds.      Findings: No lesion.      Comments: Well healing cardiac surgery scar with a bruising as expected.  Does have periorbital bruising to the left eye.  Extraocular movements are intact.  Vision grossly intact.     Neurological:      General: No focal deficit present.      Mental Status: He is alert and oriented to person, place, and time. Mental status is at baseline.   Psychiatric:         Mood and Affect: Mood normal.         Behavior: Behavior normal.         Thought Content: Thought content normal.         Judgment: Judgment normal.         Imaging Review:     Imaging Results              CT Chest Abdomen Pelvis With IV Contrast (XPD) NO Oral Contrast (Preliminary result)  Result time 01/14/25 23:33:09      Preliminary result by Lyle Armendariz Jr., MD (01/14/25 23:33:09)                   Narrative:    START OF REPORT:  Technique: CT Scan of the chest abdomen and pelvis was performed with intravenous contrast with axial as well as sagittal and, coronal images.    Dosage Information: Automated Exposure Control was utilized.    Comparison: None.    Clinical History: Trauma level 2, fall syncopal episode, +BT, head and neck trauma, swelling to left eye, reports CABG X1  week ago, best images due to motion artifact.    Findings:  Soft Tissues: Unremarkable.  Neck: The bilateral thyroid glands and isthmus are enlarged. Correlate with clinical and laboratory findings.  Mediastinum: The patient is post median sternotomy. There is a non-enhancing cystic lesion in the anterior mediastinal pre-vascular space measuring 1.6 x 3.7. This may be reflective of a small seroma.  Heart: Moderate cardiomegaly is seen.  Aorta: No aortic dissection or aneurysm is seen. Moderate aortic calcification is seen in the arch and descending thoracic aorta. A small penetrating atherosclerotic ulcer is identified in the aortic arch measuring 2.0 x 0.9 cm.  Pulmonary Arteries: Unremarkable.  Lungs: There are moderate sized bilateral pleural effusions with associated passive atelectasis of the adjacent lung parenchyma. There is intrafissural seepage of fluid and possible loculated components seen on the left.  Bony Structures:  Spine: Mild spondylolytic changes are seen in the thoracic spine.  Ribs: No rib fractures are identified.  Abdomen:  Abdominal Wall: No abdominal wall pathology is seen.  Liver: The liver appears unremarkable.  Trauma: No traumatic injury is identified.  Biliary System: No intrahepatic or extrahepatic biliary duct dilatation is seen.  Gallbladder: Multiple gallstones are seen in the gallbladder which otherwise appears unremarkable.  Pancreas: The pancreas appears unremarkable.  Spleen: The spleen appears unremarkable.  Adrenals: The adrenal glands appear unremarkable.  Kidneys: Multiple cysts are identified in the bilateral kidneys the largest of which is located in the right interpolar region measuring 1.5 cm (Series 2 Image 143). Otherwise the kidneys appear unremarkable with no stones masses or hydronephrosis identified.  Aorta: There is minimal calcification of the abdominal aorta and its branches.  IVC: Unremarkable.  Bowel:  Esophagus: The visualized distal esophagus appears  unremarkable.  Stomach: The stomach appears unremarkable.  Duodenum: Unremarkable appearing duodenum.  Small Bowel: The small bowel appears unremarkable.  Colon: Nondistended. Multiple diverticula are seen throughout the colon. No associated inflammatory stranding or pericolonic fluid is seen to suggest diverticulitis.  Appendix: The appendix is not identified but no inflammatory changes are seen in the right lower quadrant to suggest appendicitis.  Peritoneum: No intraperitoneal free air or ascites is seen.    Pelvis: There is moderate subcutaneous edema in the lower lumbar and bilateral upper gluteal regions.  Bladder: The urinary bladder walls appear mildly thickened which may be due to cystitis or chronic bladder outlet obsrtuction. A small diverticulum is seen along its right lateral wall (Series 2 Image 180).  Male:  Prostate gland: The prostate gland is moderately enlarged with its median lobe projecting into the bladder base.    Bony structures:  Dorsal Spine: There is mild to spondylosis of the visualized dorsal spine.  Bony Pelvis: The visualized bony structures of the pelvis appear unremarkable.      Impression:  1. There are moderate sized bilateral pleural effusions with associated passive atelectasis of the adjacent lung parenchyma. There is intrafissural seepage of fluid and possible loculated components seen on the left.  2. No acute traumatic intrathoracic pathology identified. No acute traumatic intraabdominal or pelvic solid organ or bowel pathology identified. Details and other findings as discussed above.                                         CT Cervical Spine Without Contrast (Preliminary result)  Result time 01/14/25 23:30:14      Preliminary result by Lyle Armendariz Jr., MD (01/14/25 23:30:14)                   Narrative:    START OF REPORT:  Technique: CT of the cervical spine was performed without intravenous contrast with axial as well as sagittal and coronal  images.    Comparison: None.    Dosage Information: Automated exposure control was utilized.    Clinical history: Trauma level 2, fall syncopal episode, +BT, head and neck trauma, swelling to left eye, reports CABG X1 week ago, best images due to motion artifact.    Findings:  Lung apices: There is a left pleural effusion. The right lung apex is clear.  Spine:  Mineralization: Within normal limits.  Rotation: No significant rotation is seen.  Scoliosis: No significant scoliosis is seen.  Vertebral Fusion: No vertebral fusion is identified.  Listhesis: No significant listhesis is identified.  Lordosis: Mild degenerative straightening of the cervical lordosis is seen. This may be positional or reflect an element of myospasm.  Osteophytes: Mild to moderate multilevel endplate osteophytes are seen.  Endplate Sclerosis: Mild endplate sclerosis is seen off the opposing endplates at C4-C5 and C5-C6.  Uncovertebral degenerative changes: Moderate right sided and left sided uncovertebral joint degenerative changes are seen at C4-C5 and C5-C6.  Facet degenerative changes: Mild left sided facet degenerative changes are seen C4-C5.  Calcifications: Small linear and globular calcifications are seen anterior to the C3-C4 through C6-C7. These may reflect ligamentous calcifications. Large linear calcification is also seen in the posterior soft tissue of the neck which may reflect nuchal ligament calcification.  Fractures: No acute fracture, dislocation, or subluxation is seen in the cervical spine.    Miscellaneous:  Mastoid air cells: The visualized mastoid air cells appear clear.  Soft Tissues: Unremarkable.      Impression:  1. No acute fracture, dislocation, or subluxation is seen in the cervical spine.  2. There is a left pleural effusion.  3. Degenerative changes and other details as above.                                         CT Head Without Contrast (Preliminary result)  Result time 01/14/25 23:21:32      Preliminary  result by Lyle Armendariz Jr., MD (01/14/25 23:21:32)                   Narrative:    START OF REPORT:  Technique: CT of the head was performed without intravenous contrast with axial as well as coronal and sagittal images.    Comparison: None.    Dosage Information: Automated exposure control was utilized.    Clinical history: Trauma level 2, syncopal episode, head and neck trauma, swelling to left eye.    Findings:  Hemorrhage: No acute intracranial hemorrhage is seen.  CSF spaces: The ventricles, sulci and basal cisterns all appear moderately prominent consistent with global cerebral atrophy.  Brain parenchyma: Mild microvascular change is seen in portions of the periventricular and deep white matter tracts. Note is made of chronic lacunar infarcts and mild gliotic changes in bilateral capsuloganglionic regions.  Cerebellum: Unremarkable.  Sella and skull base: The sella appears to be within normal limits for age.  Intracranial calcifications: Incidental note is made of bilateral choroid plexus calcification. Incidental note is made of some pineal region calcification. Incidental note is made of some calcification of the falx.  Calvarium: No acute linear or depressed skull fracture is seen.    Maxillofacial Structures:  Paranasal sinuses: Note is made of left maxillary hemosinus as discussed below.  Orbits: There is a fracture of the floor of the left orbit with possible injury to the left inferior rectus muscle seen on series 8 image 18-20 and high attenuation contents in the left maxillary sinus, consistent with hemosinus. There is left preorbital preseptal swelling with subcutaneous emphysema and associated air foci within the left orbit and left maxillary sinus.  Zygomatic arches: The zygomatic arches are intact and unremarkable.  Temporal bones and mastoids: The temporal bones and mastoids appear unremarkable.  TMJ: The mandibular condyles appear normally placed with respect to the mandibular  fossa.      Impression:  1. There is a fracture of the floor of the left orbit with possible injury to the left inferior rectus muscle seen on series 8 image 18-20 and high attenuation contents in the left maxillary sinus, consistent with hemosinus. There is left preorbital preseptal swelling with subcutaneous emphysema and associated air foci within the left orbit and left maxillary sinus.  2. No acute intracranial process identified. Details and other findings as noted above.                                         X-Ray Chest 1 View (Final result)  Result time 01/15/25 05:29:20      Final result by Enoc Andersen MD (01/15/25 05:29:20)                   Impression:    .  Cardiomegaly.    Changes of left-sided pleural effusion with increased left retrocardiac density and silhouetting of the left hemidiaphragm.    Changes suggestive of a degree of pulmonary vascular congestion and cardiac decompensation      Electronically signed by: Enoc Andersen  Date:    01/15/2025  Time:    05:29               Narrative:    EXAMINATION:  XR CHEST 1 VIEW    CPT 72876    CLINICAL HISTORY:  r/o bleeding or hemorrhage;    COMPARISON:  January 10, 2025    FINDINGS:  Examination reveals mediastinal silhouette to be within normal limits cardiac silhouette is enlarged there is increase in interstitial and pulmonary vascular markings indicating the presence of pulmonary vascular congestion and cardiac decompensation.    There is blunting of the left costophrenic angle indicating the presence of left-sided pleural effusion.  There is increased left retrocardiac density and silhouetting of the left hemidiaphragm although these might be related to pleural fluid it may also represent an infiltrate/atelectasis.    No focal consolidative changes are otherwise identified                                       X-Ray Pelvis Routine AP (In process)                      Lab Review:   CBC:  Recent Labs   Lab Result Units 01/03/25  1343  01/07/25  0526 01/07/25  0859 01/07/25  1408 01/07/25  1910 01/07/25  1913 01/08/25  0347 01/09/25  0447 01/10/25  0517 01/14/25  2243   WBC x10(3)/mcL 7.63 7.23 7.11  --  21.94*  --  18.34* 21.36* 18.06* 14.00*   RBC x10(6)/mcL 4.38* 4.02* 3.98*  --  3.04*  --  2.77* 2.60* 2.68* 2.32*   Hgb g/dL  --   --   --   --   --   --   --   --   --  8.0*   Hemoglobin g/dL 15.0 13.6* 13.3*  --  10.5*  --  9.5* 8.9* 9.2*  --    POC Hematocrit   --   --   --    < >  --    < >  --   --   --   --    Hematocrit % 44.3 41.5 41.0  --  31.7*  --  29.1* 27.0* 28.2*  --    Hct %  --   --   --   --   --   --   --   --   --  24.3*   Platelets K/uL 209 191 188  --  147*  --  150 128* 148*  --    Platelet x10(3)/mcL  --   --   --   --   --   --   --   --   --  327   MCV fL 101* 103* 103*  --  104*  --  105* 104* 105* 104.7*   MCH pg 34.2* 33.8* 33.4*  --  34.5*  --  34.3* 34.2* 34.3* 34.5*   MCHC g/dL 33.9 32.8 32.4  --  33.1  --  32.6 33.0 32.6 32.9*    < > = values in this interval not displayed.       CMP:  Recent Labs   Lab Result Units 01/03/25  1343 01/06/25  0925 01/07/25  0859 01/07/25  1910 01/08/25  0347 01/09/25  0447 01/10/25  0517 01/14/25  2242   Glucose mg/dL 147*   < > 94 134* 280* 113* 106  --    Calcium mg/dL 9.8   < > 9.6 9.8 9.4 9.6 9.7 8.4*   Albumin g/dL 3.5  --  3.4* 2.4* 3.5 3.3* 3.2* 2.8*   Total Protein g/dL 6.9  --  6.0 4.0* 5.0* 4.8* 5.6*  --    Sodium mmol/L 140   < > 140 139 140 139 136 139   Potassium mmol/L 3.7   < > 3.9 5.2* 3.6 4.3 4.4 4.8   CO2 mmol/L 33*   < > 24 22* 18* 26 20* 23   Chloride mmol/L 106   < > 106 109 108 107 103 109*   BUN mg/dL 17   < > 18 20 26* 36* 52*  --    Blood Urea Nitrogen mg/dL  --   --   --   --   --   --   --  34.6*   Creatinine mg/dL 1.9*   < > 1.6* 1.7* 2.3* 2.2* 2.1* 1.69*   Alkaline Phosphatase U/L 109  --  92 61 61 55 63  --    ALP unit/L  --   --   --   --   --   --   --  73   ALT unit/L 28  --  20 17 15 11 16 25   AST unit/L 21  --  28 43* 32 45* 56* 35*   Total  Bilirubin mg/dL 0.4  --  0.6 0.4 0.5 0.4 0.5  --    Bilirubin Total mg/dL  --   --   --   --   --   --   --  0.4    < > = values in this interval not displayed.       Troponin:  Recent Labs   Lab Result Units 01/14/25 2242   Troponin-I ng/mL 0.203*       ETOH:  Recent Labs     01/14/25 2242   ETHANOL <10.0        Urine Drug Screen:  Recent Labs     01/15/25  0331   FENTANYL Negative   MDMA Negative      Plan:   We will follow up facial trauma, if no intervention plan we will sign off   Remainder of care per primary medical team  We will continue to follow until we have final recommendations from facial Trauma    Fly Mead NP  c - [phone]

## 2025-01-15 NOTE — CONSULTS
Inpatient consult to Cardiology  Consult performed by: Eve Maradiaga FNP  Consult ordered by: Karina Miles FNP  Reason for consult: NSTEMI        [facility] *    Cardiology  Consult Note    Patient Name: Leonidas Bautista  MRN: [id number]  Admission Date: 1/14/2025  Hospital Length of Stay: 0 days  Code Status: Full Code   Attending Provider: Reyes, Thairy G, DO   Consulting Provider: KASEY Donald  Primary Care Physician: No, Primary Doctor  Principal Problem:<principal problem not specified>    Patient information was obtained from patient, past medical records, ER records, and primary team.     Subjective:     Chief Complaint/Reason for Consult: NSTEMI    HPI: Mr. Bautista is a 74 /o male with a history of CAD, HHD/HTN, CHF, ERIC, HLD, MAYITO, Venous Insuffiencey, Aortic Dilatation, Venous Insuffiencey, Pulmonary HTN, PAD, CKD, who is formerly known to [facility], Dr. Ennis (Last seen in 2016). He presented to the ER on 1.14.25 with complaints of a near syncopal event. He reports that he was sitting on the side of the bed when his heart began to race and he fell of the bed. He reports increased shortness of breath since he was discharged. He reports he recently underwent a CABGx4 on 1.7.25. EMS reports that he was altered when they found him on the floor grabbing at things under the bed. Significant labs include WBC 14.00, H&H 8.0/24.3, INR 1.4, Chloride 109, BUN/Creat 34.6/1.69, Calcium 8.4, Albumin 2.8, .5, Troponin 0.203. UDS positive for cannabis. CT Cervical spine negative for anything acute, but shows left pleural effusion. CT Head shows There is a fracture of the floor of the left orbit with possible injury to the left inferior rectus muscle seen on series 8 image 18-20 and high attenuation contents in the left maxillary sinus, consistent with hemosinus and  left preorbital preseptal swelling with subcutaneous emphysema and associated air foci within the left orbit and left maxillary sinus.  CT Chest shows small bilateral pleural effusions with mild loculation on the left.  CXR shows Cardiomegaly, Changes of left-sided pleural effusion with increased left retrocardiac density and silhouetting of the left hemidiaphragm, and Changes suggestive of a degree of pulmonary vascular congestion and cardiac decompensation. Xray of hip shows degenerative changes. [facility] has been consulted for NSTEMI.          PMH: CAD, HHD/HTN, CHF, ERIC, HLD, MAYITO, Venous Insuffiencey, Aortic Dilatation, Venous Insuffiencey, Pulmonary HTN, PAD, CKD, Graves Disease, Vitamin D Deficiency  PSH: LHC, CABG, [place]  Family History: Father - CABG, HTN, Cancer; Mother - HTN  Social History: Current Smoker. Denies illicit drug use and alcohol use.      Previous Cardiac Diagnostics:   CABG (1.7.25):  Coronary artery bypass graft, x 4:  Pedicled LIMA to LAD, Saphenous vein graft to PDA, Saphenous vein graft to obtuse marginal, and Saphenous vein graft to diagonal. Endoscopic vein harvesting from the left leg. Multiple intercostal nerve blocks with 0.25% Marcaine. Transesophageal echocardiogram findings: Estimated ejection fraction 55 % Ascending aorta without significant calcifications, Apical motion improved status post bypass completion.     LHC (1.6.25):  Left Main; The vessel is large and is angiographically normal.  Left Anterior Descending; Ost LAD to Prox LAD lesion was 90% stenosed. The lesion was 15 mm long. The lesion shape was eccentric. The lesion contour was irregular. The segment angulation was 45-90 degrees. Prox LAD to Mid LAD lesion was 80% stenosed. The lesion was 15 mm long. The lesion shape was eccentric. The lesion contour was irregular. The segment angulation was 45-90 degrees. The lesion was previously treated using a stent of unknown type. First Diagonal Branch: 1st Diag lesion was 60% stenosed. Left Circumflex: Prox Cx to Mid Cx lesion was 50% stenosed.  First Obtuse Marginal Branch: 1st Mrg lesion was 80% stenosed. The  lesion was located at the bend. The lesion was 20 mm long. The lesion shape was eccentric. The lesion contour was irregular. The segment angulation was 45-90 degrees. Right Coronary Artery: Prox RCA lesion was 100% stenosed. There was chronic total occlusion. The total occlusion was greater than three months old. Right Posterior Descending Artery Collaterals: RPDA filled by collaterals from 2nd Sept. Third Right Posterolateral Branch: Collaterals, 3rd RPL filled by collaterals from Dist Cx.    SPECT (11.19.24):  Abnormal myocardial perfusion scan.There are two significant perfusion abnormalities. Perfusion Abnormality #1 - There is a moderate to severe intensity, medium to large sized, fixed perfusion abnormality consistent with scar in the basal to apical wall(s). Perfusion Abnormality #2 - There is a mild intensity, mostly fixed perfusion abnormality with some reversibilty in the basal to apical  wall(s). There are no other significant perfusion abnormalities. The gated perfusion images showed an ejection fraction of 35% at rest. The ECG portion of the study is suspicious for ischemia. The patient reported no chest pain during the stress test. During stress, frequent PVCs are noted. The exercise capacity was mildly impaired.    Carotid US (2.23.16):  The study quality is average. The gray scale analysis shows homogeneous plaque and less than 50% stenosis in the common carotid artery, bilaterally.1-39% stenosis in the proximal right internal carotid artery based on Bleuth Criteria. 1-39% stenosis in the proximal left internal carotid artery based on Bleuth Criteria. Antegrade right vertebral artery flow. Antegrade left vertebral artery flow.     ECHO (2.23.16):  The study quality is average. The left ventricle is normal in size. The left ventricular ejection fraction is 50%. Global left ventricular systolic function is borderline normal. Left ventricular diastolic function is abnormal (stage I impaired relaxation).  Noted left ventricular hypertrophy. It is severe. Asymmetric septal left ventricular hypertrophy noted. The left atrial diameter is mildly increased. Left atrial diameter is 4.61 cms. The right ventricle is mildy enlarged. Right ventricular diastolic dimension is 3.34 cms. Mild calcification of the mitral valve is noted. Mild calcification of the aortic valve is noted with adequate cuspal excursion. Mild (1+) tricuspid regurgitation. Mild (1+) mitral regurgitation. Trace aortic regurgitation. Trace pulmonic regurgitation. The pulmonary artery systolic pressure is 24 mmHg.     Abdominal US (10.3.13):  The study quality is below average. All measurements are approximate.Mildly dilated abdominal aorta with mild plaque in mid to distal segment. No obvious aneurysm noted. Velocity obtained in celiac, superior mesenteric artery and proximal common iliac arteries are essentially within normal range. Renal arteries are not well visualized. Proximal renal artery to arotic velocity ratio (RAR) is not suggestive of any significant (0-59% range) proximal renal artery stenosis, bilaterally.     Review of patient's allergies indicates:  No Known Allergies  No current facility-administered medications on file prior to encounter.     Current Outpatient Medications on File Prior to Encounter   Medication Sig    amiodarone (PACERONE) 200 MG Tab Take 1 tablet (200 mg total) by mouth once daily.    apixaban (ELIQUIS) 5 mg Tab Take 1 tablet (5 mg total) by mouth 2 (two) times daily.    aspirin (ECOTRIN) 81 MG EC tablet Take 81 mg by mouth once daily.    atorvastatin (LIPITOR) 40 MG tablet Take 1 tablet (40 mg total) by mouth every evening.    clopidogreL (PLAVIX) 75 mg tablet Take 1 tablet (75 mg total) by mouth once daily.    isosorbide-hydrALAZINE 20-37.5 mg (BIDIL) 20-37.5 mg Tab Take 1 tablet by mouth 3 (three) times daily.    losartan (COZAAR) 100 MG tablet Take 100 mg by mouth once daily.    methIMAzole (TAPAZOLE) 5 MG Tab Take 10  mg by mouth once daily.    metoprolol succinate (TOPROL-XL) 50 MG 24 hr tablet Take 50 mg by mouth once daily.    NIFEdipine (PROCARDIA-XL) 30 MG (OSM) 24 hr tablet Take 30 mg by mouth every evening.    nitroGLYCERIN 0.4 MG/DOSE TL SPRY (NITROLINGUAL) 400 mcg/spray spray Place 1 spray under the tongue every 5 (five) minutes as needed for Chest pain.    omega-3 fatty acids/fish oil (FISH OIL-OMEGA-3 FATTY ACIDS) 300-1,000 mg capsule Take 1 capsule by mouth once daily.    oxyCODONE (ROXICODONE) 5 MG immediate release tablet Take 1 tablet (5 mg total) by mouth every 4 (four) hours as needed for Pain.     Review of Systems   Constitutional:  Negative for diaphoresis.   Respiratory:  Positive for shortness of breath. Negative for chest tightness.    Cardiovascular:  Negative for chest pain, palpitations and leg swelling.   Neurological:  Positive for headaches.   All other systems reviewed and are negative.      Objective:     Vital Signs (Most Recent):  Temp: 98.4 °F (36.9 °C) (01/14/25 2305)  Pulse: 65 (01/15/25 0703)  Resp: (!) 21 (01/15/25 0703)  BP: 138/71 (01/15/25 0703)  SpO2: 96 % (01/15/25 0703) Vital Signs (24h Range):  Temp:  [98.4 °F (36.9 °C)] 98.4 °F (36.9 °C)  Pulse:  [65-82] 65  Resp:  [19-34] 21  SpO2:  [90 %-98 %] 96 %  BP: ()/(61-93) 138/71   Weight: 77.1 kg (170 lb)  Body mass index is 24.39 kg/m².  SpO2: 96 %     No intake or output data in the 24 hours ending 01/15/25 0748  Lines/Drains/Airways       Peripheral Intravenous Line  Duration                  Peripheral IV - Single Lumen 18 G Anterior;Right Forearm -- days                  Significant Labs:   Chemistries:   Recent Labs   Lab 01/09/25  0447 01/09/25  1703 01/10/25  0517 01/10/25  1654 01/11/25  0604 01/14/25  2242     --  136  --   --  139   K 4.3  --  4.4  --   --  4.8     --  103  --   --  109*   CO2 26  --  20*  --   --  23   BUN 36*  --  52*  --   --  34.6*   CREATININE 2.2*  --  2.1*  --   --  1.69*   CALCIUM 9.6   --  9.7  --   --  8.4*   PROT 4.8*  --  5.6*  --   --   --    BILITOT 0.4  --  0.5  --   --  0.4   ALKPHOS 55  --  63  --   --  73   ALT 11  --  16  --   --  25   AST 45*  --  56*  --   --  35*   GLUCOSE  --   --   --   --   --  114   MG 2.4   < > 3.1* 3.1* 2.9* 2.30   TROPONINI  --   --   --   --   --  0.203*    < > = values in this interval not displayed.        CBC/Anemia Labs: Coags:    Recent Labs   Lab 01/09/25  0447 01/10/25  0517 01/14/25  2243   WBC 21.36* 18.06* 14.00*   HGB 8.9* 9.2* 8.0*   HCT 27.0* 28.2* 24.3*   * 148* 327   * 105* 104.7*   RDW 14.2 14.3 14.5    Recent Labs   Lab 01/14/25  2242   INR 1.4*   APTT 36.2*        Significant Imaging:  Imaging Results              CT Chest Abdomen Pelvis With IV Contrast (XPD) NO Oral Contrast (Preliminary result)  Result time 01/14/25 23:33:09      Preliminary result by Lyle Armendariz Jr., MD (01/14/25 23:33:09)                   Narrative:    START OF REPORT:  Technique: CT Scan of the chest abdomen and pelvis was performed with intravenous contrast with axial as well as sagittal and, coronal images.    Dosage Information: Automated Exposure Control was utilized.    Comparison: None.    Clinical History: Trauma level 2, fall syncopal episode, +BT, head and neck trauma, swelling to left eye, reports CABG X1 week ago, best images due to motion artifact.    Findings:  Soft Tissues: Unremarkable.  Neck: The bilateral thyroid glands and isthmus are enlarged. Correlate with clinical and laboratory findings.  Mediastinum: The patient is post median sternotomy. There is a non-enhancing cystic lesion in the anterior mediastinal pre-vascular space measuring 1.6 x 3.7. This may be reflective of a small seroma.  Heart: Moderate cardiomegaly is seen.  Aorta: No aortic dissection or aneurysm is seen. Moderate aortic calcification is seen in the arch and descending thoracic aorta. A small penetrating atherosclerotic ulcer is identified in the aortic  arch measuring 2.0 x 0.9 cm.  Pulmonary Arteries: Unremarkable.  Lungs: There are moderate sized bilateral pleural effusions with associated passive atelectasis of the adjacent lung parenchyma. There is intrafissural seepage of fluid and possible loculated components seen on the left.  Bony Structures:  Spine: Mild spondylolytic changes are seen in the thoracic spine.  Ribs: No rib fractures are identified.  Abdomen:  Abdominal Wall: No abdominal wall pathology is seen.  Liver: The liver appears unremarkable.  Trauma: No traumatic injury is identified.  Biliary System: No intrahepatic or extrahepatic biliary duct dilatation is seen.  Gallbladder: Multiple gallstones are seen in the gallbladder which otherwise appears unremarkable.  Pancreas: The pancreas appears unremarkable.  Spleen: The spleen appears unremarkable.  Adrenals: The adrenal glands appear unremarkable.  Kidneys: Multiple cysts are identified in the bilateral kidneys the largest of which is located in the right interpolar region measuring 1.5 cm (Series 2 Image 143). Otherwise the kidneys appear unremarkable with no stones masses or hydronephrosis identified.  Aorta: There is minimal calcification of the abdominal aorta and its branches.  IVC: Unremarkable.  Bowel:  Esophagus: The visualized distal esophagus appears unremarkable.  Stomach: The stomach appears unremarkable.  Duodenum: Unremarkable appearing duodenum.  Small Bowel: The small bowel appears unremarkable.  Colon: Nondistended. Multiple diverticula are seen throughout the colon. No associated inflammatory stranding or pericolonic fluid is seen to suggest diverticulitis.  Appendix: The appendix is not identified but no inflammatory changes are seen in the right lower quadrant to suggest appendicitis.  Peritoneum: No intraperitoneal free air or ascites is seen.    Pelvis: There is moderate subcutaneous edema in the lower lumbar and bilateral upper gluteal regions.  Bladder: The urinary bladder  walls appear mildly thickened which may be due to cystitis or chronic bladder outlet obsrtuction. A small diverticulum is seen along its right lateral wall (Series 2 Image 180).  Male:  Prostate gland: The prostate gland is moderately enlarged with its median lobe projecting into the bladder base.    Bony structures:  Dorsal Spine: There is mild to spondylosis of the visualized dorsal spine.  Bony Pelvis: The visualized bony structures of the pelvis appear unremarkable.      Impression:  1. There are moderate sized bilateral pleural effusions with associated passive atelectasis of the adjacent lung parenchyma. There is intrafissural seepage of fluid and possible loculated components seen on the left.  2. No acute traumatic intrathoracic pathology identified. No acute traumatic intraabdominal or pelvic solid organ or bowel pathology identified. Details and other findings as discussed above.                                         CT Cervical Spine Without Contrast (Preliminary result)  Result time 01/14/25 23:30:14      Preliminary result by Lyle Armendariz Jr., MD (01/14/25 23:30:14)                   Narrative:    START OF REPORT:  Technique: CT of the cervical spine was performed without intravenous contrast with axial as well as sagittal and coronal images.    Comparison: None.    Dosage Information: Automated exposure control was utilized.    Clinical history: Trauma level 2, fall syncopal episode, +BT, head and neck trauma, swelling to left eye, reports CABG X1 week ago, best images due to motion artifact.    Findings:  Lung apices: There is a left pleural effusion. The right lung apex is clear.  Spine:  Mineralization: Within normal limits.  Rotation: No significant rotation is seen.  Scoliosis: No significant scoliosis is seen.  Vertebral Fusion: No vertebral fusion is identified.  Listhesis: No significant listhesis is identified.  Lordosis: Mild degenerative straightening of the cervical lordosis is  seen. This may be positional or reflect an element of myospasm.  Osteophytes: Mild to moderate multilevel endplate osteophytes are seen.  Endplate Sclerosis: Mild endplate sclerosis is seen off the opposing endplates at C4-C5 and C5-C6.  Uncovertebral degenerative changes: Moderate right sided and left sided uncovertebral joint degenerative changes are seen at C4-C5 and C5-C6.  Facet degenerative changes: Mild left sided facet degenerative changes are seen C4-C5.  Calcifications: Small linear and globular calcifications are seen anterior to the C3-C4 through C6-C7. These may reflect ligamentous calcifications. Large linear calcification is also seen in the posterior soft tissue of the neck which may reflect nuchal ligament calcification.  Fractures: No acute fracture, dislocation, or subluxation is seen in the cervical spine.    Miscellaneous:  Mastoid air cells: The visualized mastoid air cells appear clear.  Soft Tissues: Unremarkable.      Impression:  1. No acute fracture, dislocation, or subluxation is seen in the cervical spine.  2. There is a left pleural effusion.  3. Degenerative changes and other details as above.                                         CT Head Without Contrast (Preliminary result)  Result time 01/14/25 23:21:32      Preliminary result by Lyle Armendariz Jr., MD (01/14/25 23:21:32)                   Narrative:    START OF REPORT:  Technique: CT of the head was performed without intravenous contrast with axial as well as coronal and sagittal images.    Comparison: None.    Dosage Information: Automated exposure control was utilized.    Clinical history: Trauma level 2, syncopal episode, head and neck trauma, swelling to left eye.    Findings:  Hemorrhage: No acute intracranial hemorrhage is seen.  CSF spaces: The ventricles, sulci and basal cisterns all appear moderately prominent consistent with global cerebral atrophy.  Brain parenchyma: Mild microvascular change is seen in portions  of the periventricular and deep white matter tracts. Note is made of chronic lacunar infarcts and mild gliotic changes in bilateral capsuloganglionic regions.  Cerebellum: Unremarkable.  Sella and skull base: The sella appears to be within normal limits for age.  Intracranial calcifications: Incidental note is made of bilateral choroid plexus calcification. Incidental note is made of some pineal region calcification. Incidental note is made of some calcification of the falx.  Calvarium: No acute linear or depressed skull fracture is seen.    Maxillofacial Structures:  Paranasal sinuses: Note is made of left maxillary hemosinus as discussed below.  Orbits: There is a fracture of the floor of the left orbit with possible injury to the left inferior rectus muscle seen on series 8 image 18-20 and high attenuation contents in the left maxillary sinus, consistent with hemosinus. There is left preorbital preseptal swelling with subcutaneous emphysema and associated air foci within the left orbit and left maxillary sinus.  Zygomatic arches: The zygomatic arches are intact and unremarkable.  Temporal bones and mastoids: The temporal bones and mastoids appear unremarkable.  TMJ: The mandibular condyles appear normally placed with respect to the mandibular fossa.      Impression:  1. There is a fracture of the floor of the left orbit with possible injury to the left inferior rectus muscle seen on series 8 image 18-20 and high attenuation contents in the left maxillary sinus, consistent with hemosinus. There is left preorbital preseptal swelling with subcutaneous emphysema and associated air foci within the left orbit and left maxillary sinus.  2. No acute intracranial process identified. Details and other findings as noted above.                                         X-Ray Chest 1 View (Final result)  Result time 01/15/25 05:29:20      Final result by Enoc Andersen MD (01/15/25 05:29:20)                   Impression:     .  Cardiomegaly.    Changes of left-sided pleural effusion with increased left retrocardiac density and silhouetting of the left hemidiaphragm.    Changes suggestive of a degree of pulmonary vascular congestion and cardiac decompensation      Electronically signed by: Enoc Andersen  Date:    01/15/2025  Time:    05:29               Narrative:    EXAMINATION:  XR CHEST 1 VIEW    CPT 99741    CLINICAL HISTORY:  r/o bleeding or hemorrhage;    COMPARISON:  January 10, 2025    FINDINGS:  Examination reveals mediastinal silhouette to be within normal limits cardiac silhouette is enlarged there is increase in interstitial and pulmonary vascular markings indicating the presence of pulmonary vascular congestion and cardiac decompensation.    There is blunting of the left costophrenic angle indicating the presence of left-sided pleural effusion.  There is increased left retrocardiac density and silhouetting of the left hemidiaphragm although these might be related to pleural fluid it may also represent an infiltrate/atelectasis.    No focal consolidative changes are otherwise identified                                       X-Ray Pelvis Routine AP (Final result)  Result time 01/15/25 07:18:03      Final result by Enoc Andersen MD (01/15/25 07:18:03)                   Impression:      Degenerative changes.      Electronically signed by: Enoc Andersen  Date:    01/15/2025  Time:    07:18               Narrative:    EXAMINATION:  XR PELVIS ROUTINE AP    CLINICAL HISTORY:  r/o bleeding or hemorrhage;    COMPARISON:  None.    FINDINGS:  No acute displaced fractures or dislocations.    There is some narrowing of the inferior medial aspect of both hip joints with more significant degenerative changes of the left sacroiliac joint and lumbosacral spine articular spaces otherwise preserved with smooth articular surfaces    No blastic or lytic lesions.    Soft tissues within normal limits.                                     EKG:       Telemetry:  AFIB CVR    Physical Exam  Vitals and nursing note reviewed.   HENT:      Head: Normocephalic.      Mouth/Throat:      Mouth: Mucous membranes are moist.   Eyes:      Conjunctiva/sclera: Conjunctivae normal.      Comments: Left eye/face bruising    Cardiovascular:      Rate and Rhythm: Normal rate. Rhythm irregular.      Pulses: Normal pulses.      Heart sounds: Normal heart sounds.   Pulmonary:      Effort: Pulmonary effort is normal.      Comments: Bilateral Lower Lung crackles at bases  Abdominal:      Palpations: Abdomen is soft.   Musculoskeletal:         General: Normal range of motion.      Cervical back: Normal range of motion.   Skin:     General: Skin is warm.      Findings: Bruising present.      Comments: Left Face/Eye Bruising    Midline Chest Incision C/D/I   Neurological:      Mental Status: He is alert.   Psychiatric:         Mood and Affect: Mood normal.         Behavior: Behavior normal.       Home Medications:   No current facility-administered medications on file prior to encounter.     Current Outpatient Medications on File Prior to Encounter   Medication Sig Dispense Refill    amiodarone (PACERONE) 200 MG Tab Take 1 tablet (200 mg total) by mouth once daily. 30 tablet 11    apixaban (ELIQUIS) 5 mg Tab Take 1 tablet (5 mg total) by mouth 2 (two) times daily. 30 tablet 3    aspirin (ECOTRIN) 81 MG EC tablet Take 81 mg by mouth once daily.      atorvastatin (LIPITOR) 40 MG tablet Take 1 tablet (40 mg total) by mouth every evening. 90 tablet 3    clopidogreL (PLAVIX) 75 mg tablet Take 1 tablet (75 mg total) by mouth once daily. 30 tablet 11    isosorbide-hydrALAZINE 20-37.5 mg (BIDIL) 20-37.5 mg Tab Take 1 tablet by mouth 3 (three) times daily.      losartan (COZAAR) 100 MG tablet Take 100 mg by mouth once daily.      methIMAzole (TAPAZOLE) 5 MG Tab Take 10 mg by mouth once daily.      metoprolol succinate (TOPROL-XL) 50 MG 24 hr tablet Take 50 mg by mouth once daily.       NIFEdipine (PROCARDIA-XL) 30 MG (OSM) 24 hr tablet Take 30 mg by mouth every evening.      nitroGLYCERIN 0.4 MG/DOSE TL SPRY (NITROLINGUAL) 400 mcg/spray spray Place 1 spray under the tongue every 5 (five) minutes as needed for Chest pain.      omega-3 fatty acids/fish oil (FISH OIL-OMEGA-3 FATTY ACIDS) 300-1,000 mg capsule Take 1 capsule by mouth once daily.      oxyCODONE (ROXICODONE) 5 MG immediate release tablet Take 1 tablet (5 mg total) by mouth every 4 (four) hours as needed for Pain. 20 tablet 0     Current Schedule Inpatient Medications:    Continuous Infusions:    Assessment:   NSTEMI Type IV due to recent CABG  Persistent AFIB - Currently CVR    - [id number] Score 3 (3.2% Stroke Risk Pear Year)  Heart Failure, Unspecified ?  Bilateral Pleural Effusions    - CT Chest (1.14.25): Small bilateral pleural effusions with mild loculation on the left.   Left Orbital Fracture    - CT Head (1.14.25): There is a fracture of the floor of the left orbit with possible injury to the left inferior rectus muscle seen on series 8 image 18-20 and high attenuation contents in the left maxillary sinus, consistent with hemosinus. There is left preorbital preseptal swelling with subcutaneous emphysema and associated air foci within the left orbit and left maxillary sinus.   CAD    - CABG (1.7.25): LIMA to LAD, SV to PDA, AV to OM, SV to Diagonal     - LHC (1.6.25): The Prox RCA lesion was 100% stenosed. The Prox Cx to Mid Cx lesion was 50% stenosed. The 1st Mrg lesion was 80% stenosed. The Ost LAD to Prox LAD lesion was 90% stenosed. The 1st Diag lesion was 60% stenosed. The Prox LAD to Mid LAD lesion was 80% stenosed. The ejection fraction was calculated to be 60%.  Leukocytosis  Anemia   HONG on CKD  Elevated LFTs   HHD/HTN  ERIC  HLD  MAYITO (Bilateral)  Venous Insuffiencey  Aortic Dilatation  Venous Insuffiencey  Pulmonary HTN  PAD  Graves Disease  Vitamin D Deficiency  No known history of GI Bleed     Plan:   ECHO  Pending  Trend Troponin until peak  Diurese if clinically indicated   Resume Home Medications as appropriate  Accurate I&O/Daily Weight   Keep Mag > 2 and Potassium > 4     Thank you for your consult.     Eve Maradiaga, KASEY  Cardiology  [facility]    Pt seen and examined by me, Manolo Clayton MD. I have reviewed the NP's note, assessment and plan. I have personally interviewed and examined the patient at bedside and agree with the findings. Medical decision making listed above were done under my guidance.    Assessment and Plan:  Recent CABG in [place] with reports of some post op palpitations at home. Pt reports palpitation that caused some urinary urgency. Experienced traumatic syncope while going to bathroom with orbital fracture. Unclear if pt is having PAF with possible post conversion pauses and will continue to follow Tele.     Some SOB/RIVERA following surgery but denies orthopnea. CXR showing b/l effusions with Cr 1.82 and will provide gentle diuresis following echocardiogram. Multiple flat troponins < 1 with     Will follow and make recommendation after echo

## 2025-01-15 NOTE — PT/OT/SLP EVAL
[facility]  Speech Language Pathology Department  Clinical Swallow Evaluation    Patient Name:  Leonidas Bautista   MRN:  [id number]    Recommendations     General recommendations:  SLP intervention not indicated  Solid texture recommendation:  Regular  Liquid consistency recommendation: Thin   Medications: per patient preference  Swallow strategies/precautions: small bites/sips and slow rate  Precautions: aspiration    History     Leonidas Bautista is a/n 74 y.o. male admitted as a level 2 trauma following a fall resulting in a left orbital floor fracture.    Past Medical History:   Diagnosis Date    Abnormal nuclear stress test 01/05/2025    Bilateral carotid artery stenosis 01/05/2025    CAD S/P percutaneous coronary angioplasty 2003    Stent mid LAD July 15, 2003    Carotid artery disease     Coronary artery disease involving native coronary artery of native heart without angina pectoris 01/05/2025    Diastolic dysfunction     Essential (primary) hypertension     Resistent    Graves disease     hyperthroidism    Hyperlipidemia     Hypertensive heart disease     Other hyperlipidemia 01/05/2025    Presence of drug coated stent in LAD coronary artery 01/05/2025    Primary hypertension 01/05/2025     Past Surgical History:   Procedure Laterality Date    ANGIOGRAPHY OF INTERNAL MAMMARY VESSEL Left 1/6/2025    Procedure: Angiogram Internal Mammary;  Surgeon: Erika Gomes MD;  Location: [facility] CATH LAB;  Service: Cardiology;  Laterality: Left;    ARTERIOGRAPHY OF SUBCLAVIAN ARTERY Left 1/6/2025    Procedure: ARTERIOGRAM, SUBCLAVIAN;  Surgeon: Erika Gomes MD;  Location: [facility] CATH LAB;  Service: Cardiology;  Laterality: Left;    CORONARY ARTERY BYPASS GRAFT (CABG) N/A 1/7/2025    Procedure: CORONARY ARTERY BYPASS GRAFT (CABG);  Surgeon: Sidra Guardado MD;  Location: [facility] OR;  Service: Cardiothoracic;  Laterality: N/A;  CABG x    ECHOCARDIOGRAM,TRANSESOPHAGEAL N/A 1/7/2025    Procedure:  ECHOCARDIOGRAM,TRANSESOPHAGEAL;  Surgeon: Sidra Guardado MD;  Location: [facility] OR;  Service: Cardiothoracic;  Laterality: N/A;    ENDOSCOPIC HARVEST OF VEIN Right 1/7/2025    Procedure: SURGICAL PROCUREMENT, VEIN, ENDOSCOPIC;  Surgeon: Sidra Guardado MD;  Location: [facility] OR;  Service: Cardiothoracic;  Laterality: Right;    INJECTION OF ANESTHETIC AGENT AROUND MULTIPLE INTERCOSTAL NERVES N/A 1/7/2025    Procedure: BLOCK, NERVE, INTERCOSTAL, 2 OR MORE;  Surgeon: iSdra Guardado MD;  Location: [facility] OR;  Service: Cardiothoracic;  Laterality: N/A;  Para sternal nerve lock    LEFT HEART CATHETERIZATION Left 1/6/2025    Procedure: Left heart cath;  Surgeon: Erika Gomes MD;  Location: [facility] CATH LAB;  Service: Cardiology;  Laterality: Left;    ptca with Stent mid LAD in 2003 N/A      Home diet texture/consistency: Regular and thin liquids  Current method of nutrition: NPO    Patient complaint: denies difficulty swallowing    Imaging   Results for orders placed during the hospital encounter of 01/14/25    X-Ray Chest 1 View    Narrative  EXAMINATION:  XR CHEST 1 VIEW    CPT 23770    CLINICAL HISTORY:  r/o bleeding or hemorrhage;    COMPARISON:  January 10, 2025    FINDINGS:  Examination reveals mediastinal silhouette to be within normal limits cardiac silhouette is enlarged there is increase in interstitial and pulmonary vascular markings indicating the presence of pulmonary vascular congestion and cardiac decompensation.    There is blunting of the left costophrenic angle indicating the presence of left-sided pleural effusion.  There is increased left retrocardiac density and silhouetting of the left hemidiaphragm although these might be related to pleural fluid it may also represent an infiltrate/atelectasis.    No focal consolidative changes are otherwise identified    Impression  .  Cardiomegaly.    Changes of left-sided pleural effusion with increased left retrocardiac density and silhouetting of the left  hemidiaphragm.    Changes suggestive of a degree of pulmonary vascular congestion and cardiac decompensation      Electronically signed by: Enoc Andersen  Date:    01/15/2025  Time:    05:29    Subjective     Patient awake, alert, and oriented x4 .    Patient goals: to eat/drink   Spiritual/Cultural/Latter day Beliefs/Practices that affect care: no    Pain/Comfort: Pain Rating 1: 0/10    Respiratory Status:  3L via nasal cannula    Restraints/positioning devices: none    Objective     ORAL MUSCULATURE  Dentition: own teeth  Secretion Management: adequate  Mucosal Quality: good  Facial Movement: WFL  Buccal Strength & Mobility: WFL  Mandibular Strength & Mobility: WFL  Oral Labial Strength & Mobility: WFL  Lingual Strength & Mobility: WFL  Vocal Quality: adequate    PO TRIALS  Consistency Fed By Oral Symptoms Pharyngeal Symptoms   Ice chips SLP None None   Puree SLP None None   Thin liquid by cup Self None None   Chewable solid Self None None   Thin liquid by straw Self None None     Assessment     Oropharyngeal swallow WFL.  No overt/clinical signs/sx aspiration noted.    Outcome Measures     Functional Oral Intake Scale: 7 - Total oral diet with no restrictions    Education     Patient provided with verbal education regarding SLP POC.  Understanding was verbalized.    Plan     SLP Follow-Up:  No   Plan of Care reviewed with:  patient     Time Tracking     SLP Treatment Date:   01/15/25  Speech Start Time:  1250  Speech Stop Time:  1305     Speech Total Time (min):  15 min    Billable minutes:  Swallow and Oral Function Evaluation, 15 minutes     01/15/2025

## 2025-01-15 NOTE — PLAN OF CARE
Problem: Adult Inpatient Plan of Care  Goal: Plan of Care Review  Outcome: Progressing  Goal: Patient-Specific Goal (Individualized)  Outcome: Progressing  Goal: Absence of Hospital-Acquired Illness or Injury  Outcome: Progressing  Goal: Optimal Comfort and Wellbeing  Outcome: Progressing  Goal: Readiness for Transition of Care  Outcome: Progressing     Problem: Wound  Goal: Optimal Coping  Outcome: Progressing  Goal: Optimal Functional Ability  Outcome: Progressing  Goal: Absence of Infection Signs and Symptoms  Outcome: Progressing  Goal: Improved Oral Intake  Outcome: Progressing  Goal: Optimal Pain Control and Function  Outcome: Progressing  Goal: Skin Health and Integrity  Outcome: Progressing  Goal: Optimal Wound Healing  Outcome: Progressing     Problem: Fall Injury Risk  Goal: Absence of Fall and Fall-Related Injury  Outcome: Progressing     Problem: Wound  Goal: Optimal Coping  Outcome: Progressing  Goal: Optimal Functional Ability  Outcome: Progressing  Goal: Absence of Infection Signs and Symptoms  Outcome: Progressing  Goal: Improved Oral Intake  Outcome: Progressing  Goal: Optimal Pain Control and Function  Outcome: Progressing  Goal: Skin Health and Integrity  Outcome: Progressing  Goal: Optimal Wound Healing  Outcome: Progressing

## 2025-01-16 LAB
ALBUMIN SERPL-MCNC: 2.8 G/DL (ref 3.4–4.8)
ALBUMIN/GLOB SERPL: 1.3 RATIO (ref 1.1–2)
ALP SERPL-CCNC: 76 UNIT/L (ref 40–150)
ALT SERPL-CCNC: 20 UNIT/L (ref 0–55)
ANION GAP SERPL CALC-SCNC: 6 MEQ/L
AST SERPL-CCNC: 22 UNIT/L (ref 5–34)
BASOPHILS # BLD AUTO: 0.02 X10(3)/MCL
BASOPHILS NFR BLD AUTO: 0.2 %
BILIRUB SERPL-MCNC: 0.5 MG/DL
BUN SERPL-MCNC: 29.5 MG/DL (ref 8.4–25.7)
CALCIUM SERPL-MCNC: 8.6 MG/DL (ref 8.8–10)
CHLORIDE SERPL-SCNC: 106 MMOL/L (ref 98–107)
CK SERPL-CCNC: 64 U/L (ref 30–200)
CO2 SERPL-SCNC: 28 MMOL/L (ref 23–31)
CREAT SERPL-MCNC: 2 MG/DL (ref 0.72–1.25)
CREAT/UREA NIT SERPL: 15
EOSINOPHIL # BLD AUTO: 0.17 X10(3)/MCL (ref 0–0.9)
EOSINOPHIL NFR BLD AUTO: 1.6 %
ERYTHROCYTE [DISTWIDTH] IN BLOOD BY AUTOMATED COUNT: 15.1 % (ref 11.5–17)
GFR SERPLBLD CREATININE-BSD FMLA CKD-EPI: 34 ML/MIN/1.73/M2
GLOBULIN SER-MCNC: 2.1 GM/DL (ref 2.4–3.5)
GLUCOSE SERPL-MCNC: 105 MG/DL (ref 82–115)
HCT VFR BLD AUTO: 26.4 % (ref 42–52)
HGB BLD-MCNC: 8.5 G/DL (ref 14–18)
IMM GRANULOCYTES # BLD AUTO: 0.05 X10(3)/MCL (ref 0–0.04)
IMM GRANULOCYTES NFR BLD AUTO: 0.5 %
LYMPHOCYTES # BLD AUTO: 1.82 X10(3)/MCL (ref 0.6–4.6)
LYMPHOCYTES NFR BLD AUTO: 16.8 %
MAGNESIUM SERPL-MCNC: 2 MG/DL (ref 1.6–2.6)
MCH RBC QN AUTO: 33.9 PG (ref 27–31)
MCHC RBC AUTO-ENTMCNC: 32.2 G/DL (ref 33–36)
MCV RBC AUTO: 105.2 FL (ref 80–94)
MONOCYTES # BLD AUTO: 1 X10(3)/MCL (ref 0.1–1.3)
MONOCYTES NFR BLD AUTO: 9.2 %
NEUTROPHILS # BLD AUTO: 7.8 X10(3)/MCL (ref 2.1–9.2)
NEUTROPHILS NFR BLD AUTO: 71.7 %
NRBC BLD AUTO-RTO: 0.3 %
PLATELET # BLD AUTO: 387 X10(3)/MCL (ref 130–400)
PMV BLD AUTO: 9.3 FL (ref 7.4–10.4)
POTASSIUM SERPL-SCNC: 4.2 MMOL/L (ref 3.5–5.1)
PROT SERPL-MCNC: 4.9 GM/DL (ref 5.8–7.6)
RBC # BLD AUTO: 2.51 X10(6)/MCL (ref 4.7–6.1)
SODIUM SERPL-SCNC: 140 MMOL/L (ref 136–145)
TROPONIN I SERPL-MCNC: 0.15 NG/ML (ref 0–0.04)
WBC # BLD AUTO: 10.86 X10(3)/MCL (ref 4.5–11.5)

## 2025-01-16 PROCEDURE — 63600175 PHARM REV CODE 636 W HCPCS: Performed by: INTERNAL MEDICINE

## 2025-01-16 PROCEDURE — 97162 PT EVAL MOD COMPLEX 30 MIN: CPT

## 2025-01-16 PROCEDURE — 82550 ASSAY OF CK (CPK): CPT | Performed by: INTERNAL MEDICINE

## 2025-01-16 PROCEDURE — 36415 COLL VENOUS BLD VENIPUNCTURE: CPT

## 2025-01-16 PROCEDURE — 85025 COMPLETE CBC W/AUTO DIFF WBC: CPT

## 2025-01-16 PROCEDURE — 97166 OT EVAL MOD COMPLEX 45 MIN: CPT

## 2025-01-16 PROCEDURE — 97530 THERAPEUTIC ACTIVITIES: CPT

## 2025-01-16 PROCEDURE — 80053 COMPREHEN METABOLIC PANEL: CPT

## 2025-01-16 PROCEDURE — 25000242 PHARM REV CODE 250 ALT 637 W/ HCPCS: Performed by: INTERNAL MEDICINE

## 2025-01-16 PROCEDURE — 83735 ASSAY OF MAGNESIUM: CPT

## 2025-01-16 PROCEDURE — 21400001 HC TELEMETRY ROOM

## 2025-01-16 PROCEDURE — 27000221 HC OXYGEN, UP TO 24 HOURS

## 2025-01-16 PROCEDURE — 25000003 PHARM REV CODE 250: Performed by: INTERNAL MEDICINE

## 2025-01-16 PROCEDURE — 97535 SELF CARE MNGMENT TRAINING: CPT

## 2025-01-16 PROCEDURE — 84484 ASSAY OF TROPONIN QUANT: CPT | Performed by: INTERNAL MEDICINE

## 2025-01-16 RX ORDER — FUROSEMIDE 20 MG/1
20 TABLET ORAL DAILY
Status: DISCONTINUED | OUTPATIENT
Start: 2025-01-16 | End: 2025-01-16

## 2025-01-16 RX ORDER — CLOPIDOGREL BISULFATE 75 MG/1
75 TABLET ORAL DAILY
Status: DISCONTINUED | OUTPATIENT
Start: 2025-01-16 | End: 2025-01-17 | Stop reason: HOSPADM

## 2025-01-16 RX ORDER — ASPIRIN 81 MG/1
81 TABLET ORAL DAILY
Status: DISCONTINUED | OUTPATIENT
Start: 2025-01-16 | End: 2025-01-16

## 2025-01-16 RX ORDER — FUROSEMIDE 20 MG/1
20 TABLET ORAL DAILY
Status: DISCONTINUED | OUTPATIENT
Start: 2025-01-17 | End: 2025-01-17 | Stop reason: HOSPADM

## 2025-01-16 RX ORDER — IPRATROPIUM BROMIDE AND ALBUTEROL SULFATE 2.5; .5 MG/3ML; MG/3ML
3 SOLUTION RESPIRATORY (INHALATION) EVERY 4 HOURS PRN
Status: DISCONTINUED | OUTPATIENT
Start: 2025-01-16 | End: 2025-01-17 | Stop reason: HOSPADM

## 2025-01-16 RX ORDER — FLUTICASONE FUROATE AND VILANTEROL 100; 25 UG/1; UG/1
1 POWDER RESPIRATORY (INHALATION) DAILY
Status: DISCONTINUED | OUTPATIENT
Start: 2025-01-16 | End: 2025-01-17 | Stop reason: HOSPADM

## 2025-01-16 RX ADMIN — ASPIRIN 81 MG: 81 TABLET, COATED ORAL at 08:01

## 2025-01-16 RX ADMIN — ISOSORBIDE DINITRATE: 20 TABLET ORAL at 09:01

## 2025-01-16 RX ADMIN — TIOTROPIUM BROMIDE INHALATION SPRAY 2 PUFF: 3.12 SPRAY, METERED RESPIRATORY (INHALATION) at 09:01

## 2025-01-16 RX ADMIN — APIXABAN 5 MG: 5 TABLET, FILM COATED ORAL at 08:01

## 2025-01-16 RX ADMIN — FUROSEMIDE 40 MG: 10 INJECTION, SOLUTION INTRAMUSCULAR; INTRAVENOUS at 08:01

## 2025-01-16 RX ADMIN — FLUTICASONE FUROATE AND VILANTEROL TRIFENATATE 1 PUFF: 100; 25 POWDER RESPIRATORY (INHALATION) at 08:01

## 2025-01-16 RX ADMIN — ATORVASTATIN CALCIUM 40 MG: 40 TABLET, FILM COATED ORAL at 09:01

## 2025-01-16 RX ADMIN — METHIMAZOLE 10 MG: 10 TABLET ORAL at 08:01

## 2025-01-16 RX ADMIN — TIOTROPIUM BROMIDE INHALATION SPRAY 2 PUFF: 3.12 SPRAY, METERED RESPIRATORY (INHALATION) at 04:01

## 2025-01-16 RX ADMIN — APIXABAN 5 MG: 5 TABLET, FILM COATED ORAL at 09:01

## 2025-01-16 RX ADMIN — CLOPIDOGREL BISULFATE 75 MG: 75 TABLET ORAL at 08:01

## 2025-01-16 RX ADMIN — AMIODARONE HYDROCHLORIDE 200 MG: 200 TABLET ORAL at 08:01

## 2025-01-16 RX ADMIN — NIFEDIPINE 30 MG: 30 TABLET, FILM COATED, EXTENDED RELEASE ORAL at 09:01

## 2025-01-16 RX ADMIN — METOPROLOL SUCCINATE 50 MG: 50 TABLET, EXTENDED RELEASE ORAL at 08:01

## 2025-01-16 NOTE — PT/OT/SLP EVAL
Occupational Therapy  Evaluation    Name: Leonidas Bautista  MRN: [id number]  Admitting Diagnosis: Fall, recent CABGx4  Recent Surgery: * No surgery found *      Recommendations:     Discharge therapy intensity: Low Intensity Therapy   Discharge Equipment Recommendations:  none  Barriers to discharge:  Other (Comment) (Medically complex)    Assessment:     Leonidas Bautista is a 74 y.o. male with a medical diagnosis of fall, non-op facial fractures, recent CABGx4 on 1/7/25 and h/o CAD. Pt recently discharged home with HH therapy and he reports his dtr has been staying with him to (A) home management tasks/meal prep. Pt is (I) at baseline.  He presents with the following performance deficits affecting function: weakness, impaired endurance, impaired self care skills, impaired functional mobility, impaired cardiopulmonary response to activity, decreased safety awareness, impaired skin, other (comment) (sternal prxns).     Pt tolerated OT eval well, vInstructed ADL performance while adhering to sternal prxns. Pt receptive to all education and able to return demo. BP soft, but stable t/o session. Pt states his brother and dtr will be able to (A) him at home. Recommending Low intensity at d/c. Will progress as able.      Rehab Prognosis: Good; patient would benefit from acute skilled OT services to address these deficits and reach maximum level of function.       Plan:     Patient to be seen 4 x/week to address the above listed problems via self-care/home management, therapeutic activities, therapeutic exercises  Plan of Care Expires: 02/21/25  Plan of Care Reviewed with: patient    Subjective     Chief Complaint: none reported   Patient/Family Comments/goals: be independent again    Occupational Profile:  Living Environment: lives alone in [facility], x4STE with R-sided rail, t/s combo - no seat. Grab bars in    Previous level of function: independent  Roles and Routines: father, brother, friend  Equipment Used at Home:  none  Assistance upon Discharge: dtr, brother    Pain/Comfort:  Pain Rating 1: 0/10    Patients cultural, spiritual, Buddhist conflicts given the current situation: no    Objective:     OT communicated with RN prior to session.      Patient was found HOB elevated with pulse ox (continuous), telemetry, oxygen, PureWick, peripheral IV upon OT entry to room.    General Precautions: Standard, sternal, fall  Orthopedic Precautions: N/A  Braces: N/A    Vital Signs: Blood Pressure: 112/72 (69 bpm) - semi-sup; 120/65 (78bpm) sts  Sp02: >93% t/o  Supplemental 02: 2 LPM on NC    Bed Mobility:  instructed technique while maintaining sternal prxns. Pt hugs pillow to roll.   Patient completed Scooting/Bridging with stand by assistance  Patient completed Supine to Sit with stand by assistance    Functional Mobility/Transfers:  Patient completed Sit <> Stand Transfer with contact guard assistance  with  no assistive device and hand-held assist   Patient completed Bed <> Chair Transfer using Step Transfer technique with contact guard assistance with no assistive device  Patient completed Toilet Transfer Step Transfer technique with contact guard assistance and cues for hand placement r/t sternal prxns with  no AD  Functional Mobility: completed mobility in HW and regular toilet transfer with SBA-CGA and cues for sternal prxns.     Activities of Daily Living:  Grooming: contact guard assistance standing at sink, completed hand hygiene   Lower Body Dressing: maximal assistance to don socks  Toileting: contact guard assistance performs posterior adelso care s/p +BM on regular toilet.  Pt instructed on adaptive technique to maintain sternal prxns. Pt returns demonstration.     AMPA 6 Click ADL:  AMPAC Total Score: 19    Functional Cognition:  Intact    Visual Perceptual Skills:  Intact  No fxnl deficits/field cuts in L eye.     Upper Extremity Function:  Right Upper Extremity:   WFL    Left Upper Extremity:  WFL    Balance:   Dynamic  sitting balance:WFL  Dynamic standing balance:SBA-CGA    Therapeutic Positioning  Risk for acquired pressure injuries is decreased due to ability to get to BSC/toilet with assist and continence.    OT interventions performed during the course of today's session:   Education was provided on benefits of and recommendations for therapeutic positioning    Skin assessment: all bony prominences were assessed    Findings: no redness or breakdown noted; sternal incision     OT recommendations for therapeutic positioning throughout hospitalization:   Follow [facility] Pressure Injury Prevention Protocol  Geomat recommended for sacral protection while UIC    Patient Education:  Patient provided with verbal education and demonstrations education regarding OT role/goals/POC, post op precautions, fall prevention, safety awareness, and Discharge/DME recommendations.  Patient was able to return demonstration.     Patient left up in chair with all lines intact, call button in reach, and RN notified.    GOALS:   Multidisciplinary Problems       Occupational Therapy Goals          Problem: Occupational Therapy    Goal Priority Disciplines Outcome Interventions   Occupational Therapy Goal     OT, PT/OT Progressing    Description: LTG: Pt will perform basic ADLs and ADL transfers with Modified independence and good compliance to sternal precautions using LRAD by discharge.    STG: to be met by 1/30/25    Pt will complete grooming standing at sink with LRAD with SBA.  Pt will complete UB dressing with SBA.  Pt will complete LB dressing with SBA using LRAD and AE prn.  Pt will complete toileting with SBA using LRAD.  Pt will complete functional mobility to/from toilet and toilet transfer with SBA using LRAD.   Pt will independently verbalize sternal precautions with >90% accuracy.                        History:     Past Medical History:   Diagnosis Date    Abnormal nuclear stress test 01/05/2025    Bilateral carotid artery stenosis  01/05/2025    CAD S/P percutaneous coronary angioplasty 2003    Stent mid LAD July 15, 2003    Carotid artery disease     Coronary artery disease involving native coronary artery of native heart without angina pectoris 01/05/2025    Diastolic dysfunction     Essential (primary) hypertension     Resistent    Graves disease     hyperthroidism    Hyperlipidemia     Hypertensive heart disease     Other hyperlipidemia 01/05/2025    Presence of drug coated stent in LAD coronary artery 01/05/2025    Primary hypertension 01/05/2025         Past Surgical History:   Procedure Laterality Date    ANGIOGRAPHY OF INTERNAL MAMMARY VESSEL Left 1/6/2025    Procedure: Angiogram Internal Mammary;  Surgeon: Erika Gomes MD;  Location: [facility] CATH LAB;  Service: Cardiology;  Laterality: Left;    ARTERIOGRAPHY OF SUBCLAVIAN ARTERY Left 1/6/2025    Procedure: ARTERIOGRAM, SUBCLAVIAN;  Surgeon: Erika Gomes MD;  Location: [facility] CATH LAB;  Service: Cardiology;  Laterality: Left;    CORONARY ARTERY BYPASS GRAFT (CABG) N/A 1/7/2025    Procedure: CORONARY ARTERY BYPASS GRAFT (CABG);  Surgeon: Sidra Guardado MD;  Location: [facility] OR;  Service: Cardiothoracic;  Laterality: N/A;  CABG x    ECHOCARDIOGRAM,TRANSESOPHAGEAL N/A 1/7/2025    Procedure: ECHOCARDIOGRAM,TRANSESOPHAGEAL;  Surgeon: Sidra Guardado MD;  Location: [facility] OR;  Service: Cardiothoracic;  Laterality: N/A;    ENDOSCOPIC HARVEST OF VEIN Right 1/7/2025    Procedure: SURGICAL PROCUREMENT, VEIN, ENDOSCOPIC;  Surgeon: Sidra Guardado MD;  Location: [facility] OR;  Service: Cardiothoracic;  Laterality: Right;    INJECTION OF ANESTHETIC AGENT AROUND MULTIPLE INTERCOSTAL NERVES N/A 1/7/2025    Procedure: BLOCK, NERVE, INTERCOSTAL, 2 OR MORE;  Surgeon: Sidra Guardado MD;  Location: [facility] OR;  Service: Cardiothoracic;  Laterality: N/A;  Para sternal nerve lock    LEFT HEART CATHETERIZATION Left 1/6/2025    Procedure: Left heart cath;  Surgeon: Erika Gomes MD;  Location: [facility]  CATH LAB;  Service: Cardiology;  Laterality: Left;    ptca with Stent mid LAD in 2003 N/A        Time Tracking:     OT Date of Treatment: 01/16/25  OT Start Time: 0950  OT Stop Time: 1029  OT Total Time (min): 39 min    Billable Minutes:Evaluation MOD  Self Care/Home Management 2    1/16/2025

## 2025-01-16 NOTE — PLAN OF CARE
Problem: Physical Therapy  Goal: Physical Therapy Goal  Description: Goals to be met by: 25     Patient will increase functional independence with mobility by performin. Supine to sit with [place]  2. Sit to supine with [place]  3. Sit to stand transfer with [place]  4. Gait  x 300 feet with [place] using No Assistive Device.   5. Ascend/descend 4 stair with right Handrails Supervision using No Assistive Device.     Outcome: Progressing

## 2025-01-16 NOTE — PROGRESS NOTES
[facility] *    Cardiology  Progress Note    Patient Name: Leonidas Bautista  MRN: [id number]  Admission Date: 1/14/2025  Hospital Length of Stay: 1 days  Code Status: Full Code   Attending Provider: Reyes, Thairy G, DO   Consulting Provider: KASEY Donald  Primary Care Physician: Ann, Primary Doctor  Principal Problem:<principal problem not specified>    Patient information was obtained from patient, past medical records, ER records, and primary team.     Subjective:     Chief Complaint/Reason for Consult: NSTEMI    HPI: Mr. Bautista is a 74 /o male with a history of CAD, HHD/HTN, CHF, ERIC, HLD, MAYITO, Venous Insuffiencey, Aortic Dilatation, Venous Insuffiencey, Pulmonary HTN, PAD, CKD, who is formerly known to [facility], Dr. Ennis (Last seen in 2016). He presented to the ER on 1.14.25 with complaints of a near syncopal event. He reports that he was sitting on the side of the bed when his heart began to race and he fell of the bed. He reports increased shortness of breath since he was discharged. He reports he recently underwent a CABGx4 on 1.7.25. EMS reports that he was altered when they found him on the floor grabbing at things under the bed. Significant labs include WBC 14.00, H&H 8.0/24.3, INR 1.4, Chloride 109, BUN/Creat 34.6/1.69, Calcium 8.4, Albumin 2.8, .5, Troponin 0.203. UDS positive for cannabis. CT Cervical spine negative for anything acute, but shows left pleural effusion. CT Head shows There is a fracture of the floor of the left orbit with possible injury to the left inferior rectus muscle seen on series 8 image 18-20 and high attenuation contents in the left maxillary sinus, consistent with hemosinus and  left preorbital preseptal swelling with subcutaneous emphysema and associated air foci within the left orbit and left maxillary sinus. CT Chest shows small bilateral pleural effusions with mild loculation on the left.  CXR shows Cardiomegaly, Changes of left-sided pleural effusion with  "increased left retrocardiac density and silhouetting of the left hemidiaphragm, and Changes suggestive of a degree of pulmonary vascular congestion and cardiac decompensation. Xray of hip shows degenerative changes. [facility] has been consulted for NSTEMI.    1.16.25: NAD. VSS. No complaints of CP/SOB/Palps. "I feel okay"  H&H 8.5/26.4, Creat 2.0    PMH: CAD, HHD/HTN, CHF, ERIC, HLD, MAYITO, Venous Insuffiencey, Aortic Dilatation, Venous Insuffiencey, Pulmonary HTN, PAD, CKD, Graves Disease, Vitamin D Deficiency  PSH: LHC, CABG, [place]  Family History: Father - CABG, HTN, Cancer; Mother - HTN  Social History: Current Smoker. Denies illicit drug use and alcohol use.      Previous Cardiac Diagnostics:   ECHO (1.15.25):  Technically difficult study. Left Ventricle: The left ventricle is normal in size. Increased wall thickness. There is normal systolic function with a visually estimated ejection fraction of 55 - 60%. Right Ventricle: Right ventricle was not well visualized due to poor acoustic window.  Aortic Valve: There is no stenosis. There is no significant regurgitation. Mitral Valve: There is mild regurgitation. Tricuspid Valve: There is trace regurgitation. Pericardium: There is no pericardial effusion. Left pleural effusion.    CABG (1.7.25):  Coronary artery bypass graft, x 4:  Pedicled LIMA to LAD, Saphenous vein graft to PDA, Saphenous vein graft to obtuse marginal, and Saphenous vein graft to diagonal. Endoscopic vein harvesting from the left leg. Multiple intercostal nerve blocks with 0.25% Marcaine. Transesophageal echocardiogram findings: Estimated ejection fraction 55 % Ascending aorta without significant calcifications, Apical motion improved status post bypass completion.     LHC (1.6.25):  Left Main; The vessel is large and is angiographically normal.  Left Anterior Descending; Ost LAD to Prox LAD lesion was 90% stenosed. The lesion was 15 mm long. The lesion shape was eccentric. The lesion contour was irregular. " The segment angulation was 45-90 degrees. Prox LAD to Mid LAD lesion was 80% stenosed. The lesion was 15 mm long. The lesion shape was eccentric. The lesion contour was irregular. The segment angulation was 45-90 degrees. The lesion was previously treated using a stent of unknown type. First Diagonal Branch: 1st Diag lesion was 60% stenosed. Left Circumflex: Prox Cx to Mid Cx lesion was 50% stenosed.  First Obtuse Marginal Branch: 1st Mrg lesion was 80% stenosed. The lesion was located at the bend. The lesion was 20 mm long. The lesion shape was eccentric. The lesion contour was irregular. The segment angulation was 45-90 degrees. Right Coronary Artery: Prox RCA lesion was 100% stenosed. There was chronic total occlusion. The total occlusion was greater than three months old. Right Posterior Descending Artery Collaterals: RPDA filled by collaterals from 2nd Sept. Third Right Posterolateral Branch: Collaterals, 3rd RPL filled by collaterals from Dist Cx.    SPECT (11.19.24):  Abnormal myocardial perfusion scan.There are two significant perfusion abnormalities. Perfusion Abnormality #1 - There is a moderate to severe intensity, medium to large sized, fixed perfusion abnormality consistent with scar in the basal to apical wall(s). Perfusion Abnormality #2 - There is a mild intensity, mostly fixed perfusion abnormality with some reversibilty in the basal to apical  wall(s). There are no other significant perfusion abnormalities. The gated perfusion images showed an ejection fraction of 35% at rest. The ECG portion of the study is suspicious for ischemia. The patient reported no chest pain during the stress test. During stress, frequent PVCs are noted. The exercise capacity was mildly impaired.    Carotid US (2.23.16):  The study quality is average. The gray scale analysis shows homogeneous plaque and less than 50% stenosis in the common carotid artery, bilaterally.1-39% stenosis in the proximal right internal carotid  artery based on Bleuth Criteria. 1-39% stenosis in the proximal left internal carotid artery based on Bleuth Criteria. Antegrade right vertebral artery flow. Antegrade left vertebral artery flow.     ECHO (2.23.16):  The study quality is average. The left ventricle is normal in size. The left ventricular ejection fraction is 50%. Global left ventricular systolic function is borderline normal. Left ventricular diastolic function is abnormal (stage I impaired relaxation). Noted left ventricular hypertrophy. It is severe. Asymmetric septal left ventricular hypertrophy noted. The left atrial diameter is mildly increased. Left atrial diameter is 4.61 cms. The right ventricle is mildy enlarged. Right ventricular diastolic dimension is 3.34 cms. Mild calcification of the mitral valve is noted. Mild calcification of the aortic valve is noted with adequate cuspal excursion. Mild (1+) tricuspid regurgitation. Mild (1+) mitral regurgitation. Trace aortic regurgitation. Trace pulmonic regurgitation. The pulmonary artery systolic pressure is 24 mmHg.     Abdominal US (10.3.13):  The study quality is below average. All measurements are approximate.Mildly dilated abdominal aorta with mild plaque in mid to distal segment. No obvious aneurysm noted. Velocity obtained in celiac, superior mesenteric artery and proximal common iliac arteries are essentially within normal range. Renal arteries are not well visualized. Proximal renal artery to arotic velocity ratio (RAR) is not suggestive of any significant (0-59% range) proximal renal artery stenosis, bilaterally.     Review of patient's allergies indicates:  No Known Allergies  No current facility-administered medications on file prior to encounter.     Current Outpatient Medications on File Prior to Encounter   Medication Sig    amiodarone (PACERONE) 200 MG Tab Take 1 tablet (200 mg total) by mouth once daily.    apixaban (ELIQUIS) 5 mg Tab Take 1 tablet (5 mg total) by mouth 2 (two)  times daily.    aspirin (ECOTRIN) 81 MG EC tablet Take 81 mg by mouth once daily.    atorvastatin (LIPITOR) 40 MG tablet Take 1 tablet (40 mg total) by mouth every evening.    clopidogreL (PLAVIX) 75 mg tablet Take 1 tablet (75 mg total) by mouth once daily.    isosorbide-hydrALAZINE 20-37.5 mg (BIDIL) 20-37.5 mg Tab Take 1 tablet by mouth 3 (three) times daily.    losartan (COZAAR) 100 MG tablet Take 100 mg by mouth once daily.    methIMAzole (TAPAZOLE) 5 MG Tab Take 10 mg by mouth once daily.    metoprolol succinate (TOPROL-XL) 50 MG 24 hr tablet Take 50 mg by mouth once daily.    NIFEdipine (PROCARDIA-XL) 30 MG (OSM) 24 hr tablet Take 30 mg by mouth every evening.    nitroGLYCERIN 0.4 MG/DOSE TL SPRY (NITROLINGUAL) 400 mcg/spray spray Place 1 spray under the tongue every 5 (five) minutes as needed for Chest pain.    omega-3 fatty acids/fish oil (FISH OIL-OMEGA-3 FATTY ACIDS) 300-1,000 mg capsule Take 1 capsule by mouth once daily.    oxyCODONE (ROXICODONE) 5 MG immediate release tablet Take 1 tablet (5 mg total) by mouth every 4 (four) hours as needed for Pain.     Review of Systems   Constitutional:  Negative for diaphoresis.   Respiratory:  Positive for shortness of breath. Negative for chest tightness.    Cardiovascular:  Negative for chest pain, palpitations and leg swelling.   Neurological:  Positive for headaches.   All other systems reviewed and are negative.      Objective:     Vital Signs (Most Recent):  Temp: 97.8 °F (36.6 °C) (01/16/25 0409)  Pulse: (P) 80 (01/16/25 0604)  Resp: (!) (P) 22 (01/16/25 0604)  BP: 126/83 (01/16/25 0409)  SpO2: 95 % (01/16/25 0409) Vital Signs (24h Range):  Temp:  [97.7 °F (36.5 °C)-98.7 °F (37.1 °C)] 97.8 °F (36.6 °C)  Pulse:  [63-88] (P) 80  Resp:  [18-25] (P) 22  SpO2:  [93 %-100 %] 95 %  BP: (109-156)/(72-94) 126/83   Weight: 77.1 kg (170 lb)  Body mass index is 24.39 kg/m².  SpO2: 95 %       Intake/Output Summary (Last 24 hours) at 1/16/2025 0715  Last data filed at  1/16/2025 0500  Gross per 24 hour   Intake 240 ml   Output 800 ml   Net -560 ml     Lines/Drains/Airways       Drain  Duration             Female External Urinary Catheter w/ Suction 01/15/25 0749 <1 day              Peripheral Intravenous Line  Duration                  Peripheral IV - Single Lumen 18 G Anterior;Right Forearm -- days                  Significant Labs:   Chemistries:   Recent Labs   Lab 01/10/25  0517 01/10/25  1654 01/14/25  2242 01/15/25  0723 01/15/25  0759 01/15/25  1243 01/16/25  0339     --  139  --  141  --  140   K 4.4  --  4.8  --  4.3  --  4.2     --  109*  --  109*  --  106   CO2 20*  --  23  --  26  --  28   BUN 52*  --  34.6*  --  30.4*  --  29.5*   CREATININE 2.1*  --  1.69*  --  1.82*  --  2.00*   CALCIUM 9.7  --  8.4*  --  8.5*  --  8.6*   PROT 5.6*  --   --   --   --   --   --    BILITOT 0.5  --  0.4  --   --   --  0.5   ALKPHOS 63  --  73  --   --   --  76   ALT 16  --  25  --   --   --  20   AST 56*  --  35*  --   --   --  22   GLUCOSE  --   --  114  --  111  --  105   MG 3.1*   < > 2.30  --  2.10  --  2.00   TROPONINI  --   --  0.203* 0.203* 0.175* 0.184* 0.154*    < > = values in this interval not displayed.        CBC/Anemia Labs: Coags:    Recent Labs   Lab 01/14/25 2243 01/15/25  0759 01/15/25  0840 01/16/25  0339   WBC 14.00* 13.66*  --  10.86   HGB 8.0* 8.3*  --  8.5*   HCT 24.3* 25.2*  --  26.4*    341  --  387   .7* 104.6*  --  105.2*   RDW 14.5 14.6  --  15.1   IRON  --  32*  --   --    FOLATE  --   --  15.5  --    OTBILZKV42  --  1,742*  --   --     Recent Labs   Lab 01/14/25  2242   INR 1.4*   APTT 36.2*        Significant Imaging:  Imaging Results              CT Chest Abdomen Pelvis With IV Contrast (XPD) NO Oral Contrast (Final result)  Result time 01/15/25 08:31:56      Final result by Promise Hall MD (01/15/25 08:31:56)                   Impression:      1. Postsurgical change of recent sternotomy with expected scant  pneumomediastinum.  2. Small bilateral pleural effusions with mild loculation on the left.  3. The preliminary and final reports are concordant.      Electronically signed by: Promise Hall  Date:    01/15/2025  Time:    08:31               Narrative:    EXAMINATION:  CT CHEST ABDOMEN PELVIS WITH IV CONTRAST (XPD)    CLINICAL HISTORY:  Trauma;    TECHNIQUE:  Helical acquisition with axial, sagittal and coronal reformations obtained from the thoracic inlet to the pubic symphysis following the IV administration of contrast.    Automated tube current modulation, weight-based exposure dosing, and/or iterative reconstruction technique utilized to reach lowest reasonably achievable exposure rate.    DLP: 1330 mGy*cm    COMPARISON:  Chest radiograph 01/07/2025    FINDINGS:  CHEST:      BASE OF NECK: Mild thyromegaly.    HEART: Mild cardiomegaly.  There are postsurgical changes of recent coronary artery bypass grafting.  There are atherosclerotic calcifications of the native coronary arteries.    THORACIC VASCULATURE: Aortic atherosclerosis.  Ascending aorta measures 4.6 cm in diameter.  Calcified outpouching at the proximal descending aorta may be related to ductus bump, ulcer aneurysm...Pulmonary arteries enhance normally.    TRICIA/MEDIASTINUM: Changes compatible with recent sternotomy.  Scant pneumomediastinum.  No significant hematoma or collection.    AIRWAYS: Patent.    LUNGS/PLEURA: Small pleural effusions.  Left pleural effusion is mildly loculated.  Right pleural effusion layers dependently.  Mild atelectasis.    THORACIC SOFT TISSUES: Unremarkable.    ABDOMEN PELVIS:    LIVER: Normal attenuation. No appreciable focal hepatic lesion.    BILIARY: Cholelithiasis.    PANCREAS: No inflammatory change.    SPLEEN: Normal in size    ADRENALS: No mass.    KIDNEYS/URETERS: No hydronephrosis.  There are renal cortical cysts and too small to characterize renal cortical hypodensities.    GI TRACT/MESENTERY:  No evidence of  bowel obstruction or inflammation.     Colonic diverticulosis without acute inflammatory change.    PERITONEUM: No free fluid.No free air.    ABDOMINOPELVIC LYMPH NODES: No enlarged lymph nodes by size criteria.    ABDOMINOPELVIC VASCULATURE: Aortoiliac atherosclerosis.    BLADDER: Small diverticulum on the right.    REPRODUCTIVE ORGANS: Prostatomegaly    ABDOMINAL WALL: Unremarkable.    BONES: Postop recent sternotomy.  Degenerative change at the lumbar spine.                        Preliminary result by Lyle Armendariz Jr., MD (01/14/25 23:33:09)                   Impression:    1. There are moderate sized bilateral pleural effusions with associated passive atelectasis of the adjacent lung parenchyma. There is intrafissural seepage of fluid and possible loculated components seen on the left.  2. No acute traumatic intrathoracic pathology identified. No acute traumatic intraabdominal or pelvic solid organ or bowel pathology identified. Details and other findings as discussed above.               Narrative:    START OF REPORT:  Technique: CT Scan of the chest abdomen and pelvis was performed with intravenous contrast with axial as well as sagittal and, coronal images.    Dosage Information: Automated Exposure Control was utilized.    Comparison: None.    Clinical History: Trauma level 2, fall syncopal episode, +BT, head and neck trauma, swelling to left eye, reports CABG X1 week ago, best images due to motion artifact.    Findings:  Soft Tissues: Unremarkable.  Neck: The bilateral thyroid glands and isthmus are enlarged. Correlate with clinical and laboratory findings.  Mediastinum: The patient is post median sternotomy. There is a non-enhancing cystic lesion in the anterior mediastinal pre-vascular space measuring 1.6 x 3.7. This may be reflective of a small seroma.  Heart: Moderate cardiomegaly is seen.  Aorta: No aortic dissection or aneurysm is seen. Moderate aortic calcification is seen in the arch and  descending thoracic aorta. A small penetrating atherosclerotic ulcer is identified in the aortic arch measuring 2.0 x 0.9 cm.  Pulmonary Arteries: Unremarkable.  Lungs: There are moderate sized bilateral pleural effusions with associated passive atelectasis of the adjacent lung parenchyma. There is intrafissural seepage of fluid and possible loculated components seen on the left.  Bony Structures:  Spine: Mild spondylolytic changes are seen in the thoracic spine.  Ribs: No rib fractures are identified.  Abdomen:  Abdominal Wall: No abdominal wall pathology is seen.  Liver: The liver appears unremarkable.  Trauma: No traumatic injury is identified.  Biliary System: No intrahepatic or extrahepatic biliary duct dilatation is seen.  Gallbladder: Multiple gallstones are seen in the gallbladder which otherwise appears unremarkable.  Pancreas: The pancreas appears unremarkable.  Spleen: The spleen appears unremarkable.  Adrenals: The adrenal glands appear unremarkable.  Kidneys: Multiple cysts are identified in the bilateral kidneys the largest of which is located in the right interpolar region measuring 1.5 cm (Series 2 Image 143). Otherwise the kidneys appear unremarkable with no stones masses or hydronephrosis identified.  Aorta: There is minimal calcification of the abdominal aorta and its branches.  IVC: Unremarkable.  Bowel:  Esophagus: The visualized distal esophagus appears unremarkable.  Stomach: The stomach appears unremarkable.  Duodenum: Unremarkable appearing duodenum.  Small Bowel: The small bowel appears unremarkable.  Colon: Nondistended. Multiple diverticula are seen throughout the colon. No associated inflammatory stranding or pericolonic fluid is seen to suggest diverticulitis.  Appendix: The appendix is not identified but no inflammatory changes are seen in the right lower quadrant to suggest appendicitis.  Peritoneum: No intraperitoneal free air or ascites is seen.    Pelvis: There is moderate  subcutaneous edema in the lower lumbar and bilateral upper gluteal regions.  Bladder: The urinary bladder walls appear mildly thickened which may be due to cystitis or chronic bladder outlet obsrtuction. A small diverticulum is seen along its right lateral wall (Series 2 Image 180).  Male:  Prostate gland: The prostate gland is moderately enlarged with its median lobe projecting into the bladder base.    Bony structures:  Dorsal Spine: There is mild to spondylosis of the visualized dorsal spine.  Bony Pelvis: The visualized bony structures of the pelvis appear unremarkable.                                         CT Cervical Spine Without Contrast (Final result)  Result time 01/15/25 08:24:25      Final result by Velasquez Leggett MD (01/15/25 08:24:25)                   Impression:      No acute bony injury of the cervical spine.    No significant discrepancy with the preliminary report.      Electronically signed by: Velasquez Leggett  Date:    01/15/2025  Time:    08:24               Narrative:    EXAMINATION:  CT CERVICAL SPINE WITHOUT CONTRAST    CLINICAL HISTORY:  Trauma;    TECHNIQUE:  Helical acquisition through the cervical spine without IV contrast. Three plane reconstructions were made available for review. DLP 1329 mGycm. Automatic exposure control, adjustment of mA/kV or iterative reconstruction technique was used to reduce radiation.    COMPARISON:  None available.    FINDINGS:  No fractures identified. No subluxation. Craniocervical junction and C1-C2 relationship are normal. The odontoid is intact. There is limited evaluation of the soft tissues. No prevertebral soft tissue swelling. Ligamentous injury cannot be excluded with CT.  There are moderate degenerative changes.  There are vascular calcifications.  Chest discussed in a separate report.                        Preliminary result by Lyle Armendariz Jr., MD (01/14/25 23:30:14)                   Impression:    1. No acute fracture, dislocation,  or subluxation is seen in the cervical spine.  2. There is a left pleural effusion.  3. Degenerative changes and other details as above.               Narrative:    START OF REPORT:  Technique: CT of the cervical spine was performed without intravenous contrast with axial as well as sagittal and coronal images.    Comparison: None.    Dosage Information: Automated exposure control was utilized.    Clinical history: Trauma level 2, fall syncopal episode, +BT, head and neck trauma, swelling to left eye, reports CABG X1 week ago, best images due to motion artifact.    Findings:  Lung apices: There is a left pleural effusion. The right lung apex is clear.  Spine:  Mineralization: Within normal limits.  Rotation: No significant rotation is seen.  Scoliosis: No significant scoliosis is seen.  Vertebral Fusion: No vertebral fusion is identified.  Listhesis: No significant listhesis is identified.  Lordosis: Mild degenerative straightening of the cervical lordosis is seen. This may be positional or reflect an element of myospasm.  Osteophytes: Mild to moderate multilevel endplate osteophytes are seen.  Endplate Sclerosis: Mild endplate sclerosis is seen off the opposing endplates at C4-C5 and C5-C6.  Uncovertebral degenerative changes: Moderate right sided and left sided uncovertebral joint degenerative changes are seen at C4-C5 and C5-C6.  Facet degenerative changes: Mild left sided facet degenerative changes are seen C4-C5.  Calcifications: Small linear and globular calcifications are seen anterior to the C3-C4 through C6-C7. These may reflect ligamentous calcifications. Large linear calcification is also seen in the posterior soft tissue of the neck which may reflect nuchal ligament calcification.  Fractures: No acute fracture, dislocation, or subluxation is seen in the cervical spine.    Miscellaneous:  Mastoid air cells: The visualized mastoid air cells appear clear.  Soft Tissues: Unremarkable.                                          CT Head Without Contrast (Final result)  Result time 01/15/25 08:10:37      Final result by Jeovanny Hatch MD (01/15/25 08:10:37)                   Impression:    Impression:    1. There is a fracture of the floor of the left orbit with possible injury to the left inferior rectus muscle seen on series 8 image 18-20 and high attenuation contents in the left maxillary sinus, consistent with hemosinus. There is left preorbital preseptal swelling with subcutaneous emphysema and associated air foci within the left orbit and left maxillary sinus.    2. No acute intracranial process identified. Details and other findings as noted above.    No significant discrepancy with overnight report.      Electronically signed by: Jeovanny Hatch  Date:    01/15/2025  Time:    08:10               Narrative:      Technique: CT of the head was performed without intravenous contrast with axial as well as coronal and sagittal images.    Comparison: None.    Dosage Information: Automated exposure control was utilized.  DLP 1329    Clinical history: Trauma level 2, syncopal episode, head and neck trauma, swelling to left eye.    Findings:    Hemorrhage: No acute intracranial hemorrhage is seen.    CSF spaces: The ventricles, sulci and basal cisterns all appear moderately prominent consistent with global cerebral atrophy.    Brain parenchyma: Chronic microvascular change is seen in portions of the periventricular and deep white matter tracts. Note is made of chronic lacunar infarcts and mild gliotic changes in bilateral capsuloganglionic regions.    Cerebellum: Unremarkable.    Sella and skull base: The sella appears to be within normal limits for age.    Calvarium: No acute linear or depressed skull fracture is seen.    Maxillofacial Structures:    Paranasal sinuses: Note is made of left maxillary hemosinus as discussed below.    Orbits: There is a fracture of the floor of the left orbit with possible injury to the left  inferior rectus muscle seen on series 8 image 18-20 and high attenuation contents in the left maxillary sinus, consistent with hemosinus. There is left preorbital preseptal swelling with subcutaneous emphysema and associated air foci within the left orbit and left maxillary sinus.                        Preliminary result by Lyle Armendariz Jr., MD (01/14/25 23:21:32)                   Impression:    1. There is a fracture of the floor of the left orbit with possible injury to the left inferior rectus muscle seen on series 8 image 18-20 and high attenuation contents in the left maxillary sinus, consistent with hemosinus. There is left preorbital preseptal swelling with subcutaneous emphysema and associated air foci within the left orbit and left maxillary sinus.  2. No acute intracranial process identified. Details and other findings as noted above.               Narrative:    START OF REPORT:  Technique: CT of the head was performed without intravenous contrast with axial as well as coronal and sagittal images.    Comparison: None.    Dosage Information: Automated exposure control was utilized.    Clinical history: Trauma level 2, syncopal episode, head and neck trauma, swelling to left eye.    Findings:  Hemorrhage: No acute intracranial hemorrhage is seen.  CSF spaces: The ventricles, sulci and basal cisterns all appear moderately prominent consistent with global cerebral atrophy.  Brain parenchyma: Mild microvascular change is seen in portions of the periventricular and deep white matter tracts. Note is made of chronic lacunar infarcts and mild gliotic changes in bilateral capsuloganglionic regions.  Cerebellum: Unremarkable.  Sella and skull base: The sella appears to be within normal limits for age.  Intracranial calcifications: Incidental note is made of bilateral choroid plexus calcification. Incidental note is made of some pineal region calcification. Incidental note is made of some calcification of  the falx.  Calvarium: No acute linear or depressed skull fracture is seen.    Maxillofacial Structures:  Paranasal sinuses: Note is made of left maxillary hemosinus as discussed below.  Orbits: There is a fracture of the floor of the left orbit with possible injury to the left inferior rectus muscle seen on series 8 image 18-20 and high attenuation contents in the left maxillary sinus, consistent with hemosinus. There is left preorbital preseptal swelling with subcutaneous emphysema and associated air foci within the left orbit and left maxillary sinus.  Zygomatic arches: The zygomatic arches are intact and unremarkable.  Temporal bones and mastoids: The temporal bones and mastoids appear unremarkable.  TMJ: The mandibular condyles appear normally placed with respect to the mandibular fossa.                                         X-Ray Chest 1 View (Final result)  Result time 01/15/25 05:29:20      Final result by Enoc Andersen MD (01/15/25 05:29:20)                   Impression:    .  Cardiomegaly.    Changes of left-sided pleural effusion with increased left retrocardiac density and silhouetting of the left hemidiaphragm.    Changes suggestive of a degree of pulmonary vascular congestion and cardiac decompensation      Electronically signed by: Enoc Andersen  Date:    01/15/2025  Time:    05:29               Narrative:    EXAMINATION:  XR CHEST 1 VIEW    CPT 42501    CLINICAL HISTORY:  r/o bleeding or hemorrhage;    COMPARISON:  January 10, 2025    FINDINGS:  Examination reveals mediastinal silhouette to be within normal limits cardiac silhouette is enlarged there is increase in interstitial and pulmonary vascular markings indicating the presence of pulmonary vascular congestion and cardiac decompensation.    There is blunting of the left costophrenic angle indicating the presence of left-sided pleural effusion.  There is increased left retrocardiac density and silhouetting of the left hemidiaphragm  although these might be related to pleural fluid it may also represent an infiltrate/atelectasis.    No focal consolidative changes are otherwise identified                                       X-Ray Pelvis Routine AP (Final result)  Result time 01/15/25 07:18:03      Final result by Enoc Andersen MD (01/15/25 07:18:03)                   Impression:      Degenerative changes.      Electronically signed by: Enoc Andersen  Date:    01/15/2025  Time:    07:18               Narrative:    EXAMINATION:  XR PELVIS ROUTINE AP    CLINICAL HISTORY:  r/o bleeding or hemorrhage;    COMPARISON:  None.    FINDINGS:  No acute displaced fractures or dislocations.    There is some narrowing of the inferior medial aspect of both hip joints with more significant degenerative changes of the left sacroiliac joint and lumbosacral spine articular spaces otherwise preserved with smooth articular surfaces    No blastic or lytic lesions.    Soft tissues within normal limits.                                    Telemetry:  AFIB CVR    Physical Exam  Vitals and nursing note reviewed.   HENT:      Head: Normocephalic.      Mouth/Throat:      Mouth: Mucous membranes are moist.   Eyes:      Conjunctiva/sclera: Conjunctivae normal.      Comments: Left eye/face bruising    Cardiovascular:      Rate and Rhythm: Normal rate. Rhythm irregular.      Pulses: Normal pulses.      Heart sounds: Normal heart sounds.   Pulmonary:      Effort: Pulmonary effort is normal.      Comments: Bilateral Lower Lung crackles at bases  Abdominal:      Palpations: Abdomen is soft.   Musculoskeletal:         General: Normal range of motion.      Cervical back: Normal range of motion.   Skin:     General: Skin is warm.      Findings: Bruising present.      Comments: Left Face/Eye Bruising    Midline Chest Incision C/D/I   Neurological:      Mental Status: He is alert.   Psychiatric:         Mood and Affect: Mood normal.         Behavior: Behavior normal.        Home Medications:   No current facility-administered medications on file prior to encounter.     Current Outpatient Medications on File Prior to Encounter   Medication Sig Dispense Refill    amiodarone (PACERONE) 200 MG Tab Take 1 tablet (200 mg total) by mouth once daily. 30 tablet 11    apixaban (ELIQUIS) 5 mg Tab Take 1 tablet (5 mg total) by mouth 2 (two) times daily. 30 tablet 3    aspirin (ECOTRIN) 81 MG EC tablet Take 81 mg by mouth once daily.      atorvastatin (LIPITOR) 40 MG tablet Take 1 tablet (40 mg total) by mouth every evening. 90 tablet 3    clopidogreL (PLAVIX) 75 mg tablet Take 1 tablet (75 mg total) by mouth once daily. 30 tablet 11    isosorbide-hydrALAZINE 20-37.5 mg (BIDIL) 20-37.5 mg Tab Take 1 tablet by mouth 3 (three) times daily.      losartan (COZAAR) 100 MG tablet Take 100 mg by mouth once daily.      methIMAzole (TAPAZOLE) 5 MG Tab Take 10 mg by mouth once daily.      metoprolol succinate (TOPROL-XL) 50 MG 24 hr tablet Take 50 mg by mouth once daily.      NIFEdipine (PROCARDIA-XL) 30 MG (OSM) 24 hr tablet Take 30 mg by mouth every evening.      nitroGLYCERIN 0.4 MG/DOSE TL SPRY (NITROLINGUAL) 400 mcg/spray spray Place 1 spray under the tongue every 5 (five) minutes as needed for Chest pain.      omega-3 fatty acids/fish oil (FISH OIL-OMEGA-3 FATTY ACIDS) 300-1,000 mg capsule Take 1 capsule by mouth once daily.      oxyCODONE (ROXICODONE) 5 MG immediate release tablet Take 1 tablet (5 mg total) by mouth every 4 (four) hours as needed for Pain. 20 tablet 0     Current Schedule Inpatient Medications:   amiodarone  200 mg Oral Daily    apixaban  5 mg Oral BID    aspirin  81 mg Oral Daily    atorvastatin  40 mg Oral QHS    clopidogreL  75 mg Oral Daily    fluticasone furoate-vilanteroL  1 puff Inhalation Daily    furosemide (LASIX) injection  40 mg Intravenous Q12H    hydrALAZINE 10 mg 1 tablet, hydrALAZINE 25 mg 1 tablet, isorsorbide dinitrate 20 mg 1 tablet combination   Oral TID     methIMAzole  10 mg Oral Daily    metoprolol succinate  50 mg Oral Daily    NIFEdipine  30 mg Oral QHS    tiotropium bromide  2 puff Inhalation QHS     Continuous Infusions:    Assessment:   NSTEMI Type IV due to recent CABG    - Troponin peaked at 0.203  Persistent AFIB - Currently CVR    - [id number] Score 3 (3.2% Stroke Risk Pear Year)  Acute Diastolic Heart Failure  Bilateral Pleural Effusions    - CT Chest (1.14.25): Small bilateral pleural effusions with mild loculation on the left.   Left Orbital Fracture    - CT Head (1.14.25): There is a fracture of the floor of the left orbit with possible injury to the left inferior rectus muscle seen on series 8 image 18-20 and high attenuation contents in the left maxillary sinus, consistent with hemosinus. There is left preorbital preseptal swelling with subcutaneous emphysema and associated air foci within the left orbit and left maxillary sinus.   CAD    - CABG (1.7.25): LIMA to LAD, SV to PDA, AV to OM, SV to Diagonal     - LHC (1.6.25): The Prox RCA lesion was 100% stenosed. The Prox Cx to Mid Cx lesion was 50% stenosed. The 1st Mrg lesion was 80% stenosed. The Ost LAD to Prox LAD lesion was 90% stenosed. The 1st Diag lesion was 60% stenosed. The Prox LAD to Mid LAD lesion was 80% stenosed. The ejection fraction was calculated to be 60%.    - EF 55-60% on ECHO (1.15.25)  Leukocytosis - Resolved   Anemia   HONG on CKD - worsening   Elevated LFTs   VHD    - Mild MR, Trace TR  HHD/HTN  ERIC  HLD  MAYITO (Bilateral)  Venous Insuffiencey  Aortic Dilatation  Venous Insuffiencey  Pulmonary HTN  PAD  Graves Disease  Vitamin D Deficiency  No known history of GI Bleed     Plan:   ECHO Reviewed   Discontinue IV Lasix: Start Lasix 20 mg daily   Resume Home Medications as appropriate  Accurate I&O/Daily Weight   Keep Mag > 2 and Potassium > 4     KASEY Donald  Cardiology  [facility]    Pt seen and examined by me, Manolo Clayton MD. I have reviewed the NP's  note, assessment and plan. I have personally interviewed and examined the patient at bedside and agree with the findings. Medical decision making listed above were done under my guidance.     Assessment and Plan:  Recent CABG in [place] with reports of some post op palpitations at home. Pt reports palpitation that caused some urinary urgency. Experienced traumatic syncope while walking to bathroom with orbital fracture. Some AF on tele with CVR. Unclear if pt is having possible post conversion pauses and no issue on Tele. Discuss with pt f/u with primary cardiologist for possible outpt 30 tele monitor vs implantable loop recorder. Echo showing EF 55-60% with no pericardial effusion. L-pleural effusion noted.      Some SOB/RIVERA following surgery that is improved. Pt denies orthopnea. CXR showing b/l effusions with initial Cr 1.82 and at 2 today. Multiple flat troponins < 1 with     -stop ASA and continue plavix/Eliquis. Will avoid triple therpy  -Will sign off and be available  -Pt will f/u with outpt cardiology to discuss ILR vs 30 day monitor

## 2025-01-16 NOTE — PT/OT/SLP EVAL
Physical Therapy Evaluation    Patient Name:  Leonidas Bautista   MRN:  [id number]    Recommendations:     Discharge therapy intensity: Low Intensity Therapy   Discharge Equipment Recommendations: none   Barriers to discharge:  medical dx, impaired mobility    Assessment:     Leonidas Bautista is a 74 y.o. male admitted with a medical diagnosis of B pleural effusions; L orbital fracture. Of note, pt with recent PMH significant for s/p CABG on 1/6.      He presents with the following impairments/functional limitations: gait instability, impaired balance, impaired endurance, impaired self care skills, impaired functional mobility, impaired cardiopulmonary response to activity.    Pt tolerates eval well. Requires CGA fro transfers and ambulation. Following recent heart surgery, pt returned home with daughter-- pt was mobilizing fairly well, no use of AD and daughter would assist with cooking and cleaning. Daughter left to return home, but pt's brother will now be staying with him to assist as needed.Will continue treating to ensure safety upon dc.    Rehab Prognosis: Good; patient would benefit from acute skilled PT services to address these deficits and reach maximum level of function.    Recent Surgery: * No surgery found *      Plan:     During this hospitalization, patient would benefit from acute PT services 5 x/week to address the identified rehab impairments via gait training, therapeutic activities, therapeutic exercises and progress toward the following goals:    Plan of Care Expires:  02/16/25    Subjective     Chief Complaint: n/a  Patient/Family Comments/goals: to go home   Pain/Comfort:  Pain Rating 1: 0/10    Patients cultural, spiritual, Pentec[employer]al conflicts given the current situation: no    Living Environment:  Pt lives alone in a H with 4 steps to enter/exit with a R railing   Prior to admission, patients level of function was independent.    Equipment used at home: none.  DME owned (not currently used):  none.    Upon discharge, patient will have assistance from brother.    Objective:     Communicated with NSG prior to session.  Patient found HOB elevated with oxygen, pulse ox (continuous), telemetry, peripheral IV  upon PT entry to room.    General Precautions: Standard, fall, sternal  Orthopedic Precautions:N/A   Braces: N/A  Respiratory Status: Nasal cannula, flow 2 L/min  Blood Pressure:   112/72, 69 semi supine  120/65, 78 standing       Exams:  Cognitive Exam:  Patient is oriented to Person, Place, Time, and Situation  RLE Strength: WFL  LLE Strength: WFL  Skin integrity: Visible skin intact      Functional Mobility:  Bed Mobility:     Supine to Sit: stand by assistance; pt holding pillow to protect chest region; HOB elevated  Transfers:     Sit to Stand:  contact guard assistance with no AD  Gait: pt ambulates approx 170 ft without use of AD; one standing rest break about half way through distance; pt with a step through pattern; CGA; no major LOB       AM-PAC 6 CLICK MOBILITY  Total Score:19       Treatment & Education:  Patient provided with verbal education regarding PT role/goals/POC, post-op precautions, fall prevention, safety awareness, and discharge/DME recommendations.  Understanding was verbalized.     Patient left up in chair with all lines intact, call button in reach, and RN notified.    GOALS:   Multidisciplinary Problems       Physical Therapy Goals          Problem: Physical Therapy    Goal Priority Disciplines Outcome Interventions   Physical Therapy Goal     PT, PT/OT Progressing    Description: Goals to be met by: 25     Patient will increase functional independence with mobility by performin. Supine to sit with [place]  2. Sit to supine with [place]  3. Sit to stand transfer with [place]  4. Gait  x 300 feet with [place] using No Assistive Device.   5. Ascend/descend 4 stair with right Handrails Supervision using No Assistive Device.                           History:     Past Medical History:   Diagnosis Date    Abnormal nuclear stress test 01/05/2025    Bilateral carotid artery stenosis 01/05/2025    CAD S/P percutaneous coronary angioplasty 2003    Stent mid LAD July 15, 2003    Carotid artery disease     Coronary artery disease involving native coronary artery of native heart without angina pectoris 01/05/2025    Diastolic dysfunction     Essential (primary) hypertension     Resistent    Graves disease     hyperthroidism    Hyperlipidemia     Hypertensive heart disease     Other hyperlipidemia 01/05/2025    Presence of drug coated stent in LAD coronary artery 01/05/2025    Primary hypertension 01/05/2025       Past Surgical History:   Procedure Laterality Date    ANGIOGRAPHY OF INTERNAL MAMMARY VESSEL Left 1/6/2025    Procedure: Angiogram Internal Mammary;  Surgeon: Erika Gomes MD;  Location: [facility] CATH LAB;  Service: Cardiology;  Laterality: Left;    ARTERIOGRAPHY OF SUBCLAVIAN ARTERY Left 1/6/2025    Procedure: ARTERIOGRAM, SUBCLAVIAN;  Surgeon: Erika Gomes MD;  Location: [facility] CATH LAB;  Service: Cardiology;  Laterality: Left;    CORONARY ARTERY BYPASS GRAFT (CABG) N/A 1/7/2025    Procedure: CORONARY ARTERY BYPASS GRAFT (CABG);  Surgeon: Sidra Guardado MD;  Location: [facility] OR;  Service: Cardiothoracic;  Laterality: N/A;  CABG x    ECHOCARDIOGRAM,TRANSESOPHAGEAL N/A 1/7/2025    Procedure: ECHOCARDIOGRAM,TRANSESOPHAGEAL;  Surgeon: Sidra Guardado MD;  Location: [facility] OR;  Service: Cardiothoracic;  Laterality: N/A;    ENDOSCOPIC HARVEST OF VEIN Right 1/7/2025    Procedure: SURGICAL PROCUREMENT, VEIN, ENDOSCOPIC;  Surgeon: Sidra Guardado MD;  Location: [facility] OR;  Service: Cardiothoracic;  Laterality: Right;    INJECTION OF ANESTHETIC AGENT AROUND MULTIPLE INTERCOSTAL NERVES N/A 1/7/2025    Procedure: BLOCK, NERVE, INTERCOSTAL, 2 OR MORE;  Surgeon: Sidra Guardado MD;  Location: [facility] OR;  Service: Cardiothoracic;  Laterality: N/A;  Para sternal  nerve lock    LEFT HEART CATHETERIZATION Left 1/6/2025    Procedure: Left heart cath;  Surgeon: Erika Gomes MD;  Location: [facility] CATH LAB;  Service: Cardiology;  Laterality: Left;    ptca with Stent mid LAD in 2003 N/A        Time Tracking:     PT Received On: 01/16/25  PT Start Time: 0953     PT Stop Time: 1029  PT Total Time (min): 36 min     Billable Minutes: Evaluation mod      01/16/2025

## 2025-01-16 NOTE — PROGRESS NOTES
[facility] Hospital Medicine Progress Note        Chief Complaint: Inpatient Follow-up for     HPI:   75 yo male with PMHx of HTN, HLD, CAD s/p prior stent, recent CABG 4 vessel on 1/7/25 on ASA/Plavix, current smoker, COPD , Carotid artery disease, Thyroid disease/ Grave's disease, CHF, Pul HTN, PAD, CKD 3b, PAF on Eliquis presented to the ED as a level 2 trauma following a fall at home. Reportedly Pt was sitting in the bed and was having palpitation. When stood up he fell forward and face down on the floor. Subsequently developed nose bleed and left periorbital swelling with blur vision and associated confusion. Pt with h/o new onset PAF following CABG on 1/7/25 at [facility]. C/O increased SOB on exertion and episodes of palpitation since being discharged from OSH following CABG. Denies associated chest pain, nausea, vomiting or diaphoresis. EMS reported hypotension en route otherwise GCS was 15. Upon arrival to the ED Pt was hemodynamically stable with /78, HR 73, RR 20, SpO2 96% and afebrile. EKG with afib, non specific T wave abnormality and prolonged QT. CT head with left orbit floor fracture with injury to the left inferior rectus muscle, left maxillary hemosinus, left periorbital subcutaneous emphysema and associated air foci within the left orbit and left maxillary sinus, no intra cranial process. CT C spine with no fracture, CT chest abd pelvis with evidence of pul vascular congestion, small eliu pleural effusion, scanty expected pneumomediastinum post recent CABG, and no pelvic fracture identified. Labs showed leukocytosis, Hgb 8.0, Cr 1.6, Na 139, K 4.8, , Troponin 0.203>0.184, lactic 1.1, UDS positive for marijuana, UA without infection. Pt was evaluated by Trauma surgery and subsequently admitted to  service. Cardiology and Plastic Surgery were consulted. Plastics recommended non operative management of left facial/ orbital floor fracture. SLP  cleared Pt for regular consistency diet. Cardiology recommended telemetry, echocardiogram, trend troponin, diuresis as indicated and continue current home meds as appropriate.  Echocardiogram showed EF 55-60%, mild MR, trace TR.  Plastic surgery signed off due to nonoperative fractures.  Trauma surgery signed off.  PT/OT consulted.    Interval Hx:   Today, patient stated he was doing well and had no new complaints.  Will continue following Cardiology recommendations.  Started on Lasix 20 mg daily.    Case was discussed with patient's nurse on the floor.    Objective/physical exam:  General: In no acute distress, afebrile; left periorbital ecchymosis  Chest: Clear to auscultation bilaterally  Heart: RRR, +S1, S2, no appreciable murmur  Abdomen: Soft, nontender, BS +  MSK: Warm, no lower extremity edema, no clubbing or cyanosis  Neurologic: Alert and oriented x4, Cranial nerve II-XII intact, Strength 5/5 in all 4 extremities    VITAL SIGNS: 24 HRS MIN & MAX LAST   Temp  Min: 97.7 °F (36.5 °C)  Max: 98.7 °F (37.1 °C) 98.3 °F (36.8 °C)   BP  Min: 109/72  Max: 137/83 114/74   Pulse  Min: 63  Max: 222  (!) 222   Resp  Min: 18  Max: 23 20   SpO2  Min: 79 %  Max: 100 % (!) 79 %     I have reviewed the following labs:  Recent Labs   Lab 01/14/25  2243 01/15/25  0759 01/16/25  0339   WBC 14.00* 13.66* 10.86   RBC 2.32* 2.41* 2.51*   HGB 8.0* 8.3* 8.5*   HCT 24.3* 25.2* 26.4*   .7* 104.6* 105.2*   MCH 34.5* 34.4* 33.9*   MCHC 32.9* 32.9* 32.2*   RDW 14.5 14.6 15.1    341 387   MPV 9.5 9.7 9.3     Recent Labs   Lab 01/10/25  0517 01/10/25  1654 01/14/25  2242 01/15/25  0759 01/16/25  0339     --  139 141 140   K 4.4  --  4.8 4.3 4.2     --  109* 109* 106   CO2 20*  --  23 26 28   ANIONGAP 13  --   --   --   --    BUN 52*  --  34.6* 30.4* 29.5*   CREATININE 2.1*  --  1.69* 1.82* 2.00*     --   --   --   --    CALCIUM 9.7  --  8.4* 8.5* 8.6*   MG 3.1*   < > 2.30 2.10 2.00   ALBUMIN 3.2*  --  2.8*   --  2.8*   PROT 5.6*  --   --   --   --    ALKPHOS 63  --  73  --  76   ALT 16  --  25  --  20   AST 56*  --  35*  --  22   BILITOT 0.5  --  0.4  --  0.5    < > = values in this interval not displayed.     Assessment/Plan:  Syncope and Collapse  Left sided facial/ left orbital floor fracture due to fall  Paroxysmal atrial fibrillation with CVR  Elevated troponin / NSTEMI type 2 in the setting of PAF and recent CABG  Acute on chronic diastolic heart failure, LVEF 55-60%  Anemia of chronic disease with mild macrocytosis, moderate   Chronic kidney disease, stage IIIb  Current everyday smoker / Marijuana use      Hx- HTN, HLD, CAD s/p prior stent, recent CABG 4 vessel on 1/7/25 on ASA/Plavix, current smoker, COPD , Carotid artery disease, Thyroid disease/ Grave's disease, CHF, Pul HTN, PAD, CKD 3b, PAF    Continues to be admitted  Reporting no new complaints  Cardiology on board; follow recommendations   Plastic surgery signed off  Trauma surgery signed off   PT/OT consulted   On Eliquis 5 mg b.i.d. and Plavix 75 mg daily   Started on Lasix 20 mg daily   Continued on amiodarone, atorvastatin, fluticasone/vilanterol, hydralazine/ISDN, methimazole, metoprolol succinate, nifedipine, tiotropium  Continue monitoring patient's symptoms    VTE prophylaxis:  Eliquis    Patient condition:  Stable    Anticipated discharge and Disposition:     Pending    All diagnosis and differential diagnosis have been reviewed; assessment and plan has been documented; I have personally reviewed the labs and test results that are presently available; I have reviewed the patients medication list; I have reviewed the consulting providers response and recommendations. I have reviewed or attempted to review medical records based upon their availability    All of the patient's questions have been  addressed and answered. Patient's is agreeable to the above stated plan. I will continue to monitor closely and make adjustments to medical management as  needed.    Portions of this note dictated using EMR integrated voice recognition software, and may be subject to voice recognition errors not corrected at proofreading. Please contact writer for clarification if needed.     Radiology:  I have personally reviewed the following imaging and agree with the radiologist.     Echo    Technically difficult study.    Left Ventricle: The left ventricle is normal in size. Increased wall   thickness. There is normal systolic function with a visually estimated   ejection fraction of 55 - 60%.    Right Ventricle: Right ventricle was not well visualized due to poor   acoustic window.    Aortic Valve: There is no stenosis. There is no significant   regurgitation.    Mitral Valve: There is mild regurgitation.    Tricuspid Valve: There is trace regurgitation.    Pericardium: There is no pericardial effusion. Left pleural effusion.  CT Chest Abdomen Pelvis With IV Contrast (XPD) NO Oral Contrast  Narrative: EXAMINATION:  CT CHEST ABDOMEN PELVIS WITH IV CONTRAST (XPD)    CLINICAL HISTORY:  Trauma;    TECHNIQUE:  Helical acquisition with axial, sagittal and coronal reformations obtained from the thoracic inlet to the pubic symphysis following the IV administration of contrast.    Automated tube current modulation, weight-based exposure dosing, and/or iterative reconstruction technique utilized to reach lowest reasonably achievable exposure rate.    DLP: 1330 mGy*cm    COMPARISON:  Chest radiograph 01/07/2025    FINDINGS:  CHEST:    BASE OF NECK: Mild thyromegaly.    HEART: Mild cardiomegaly.  There are postsurgical changes of recent coronary artery bypass grafting.  There are atherosclerotic calcifications of the native coronary arteries.    THORACIC VASCULATURE: Aortic atherosclerosis.  Ascending aorta measures 4.6 cm in diameter.  Calcified outpouching at the proximal descending aorta may be related to ductus bump, ulcer aneurysm...Pulmonary arteries enhance  normally.    TRICIA/MEDIASTINUM: Changes compatible with recent sternotomy.  Scant pneumomediastinum.  No significant hematoma or collection.    AIRWAYS: Patent.    LUNGS/PLEURA: Small pleural effusions.  Left pleural effusion is mildly loculated.  Right pleural effusion layers dependently.  Mild atelectasis.    THORACIC SOFT TISSUES: Unremarkable.    ABDOMEN PELVIS:    LIVER: Normal attenuation. No appreciable focal hepatic lesion.    BILIARY: Cholelithiasis.    PANCREAS: No inflammatory change.    SPLEEN: Normal in size    ADRENALS: No mass.    KIDNEYS/URETERS: No hydronephrosis.  There are renal cortical cysts and too small to characterize renal cortical hypodensities.    GI TRACT/MESENTERY:  No evidence of bowel obstruction or inflammation.     Colonic diverticulosis without acute inflammatory change.    PERITONEUM: No free fluid.No free air.    ABDOMINOPELVIC LYMPH NODES: No enlarged lymph nodes by size criteria.    ABDOMINOPELVIC VASCULATURE: Aortoiliac atherosclerosis.    BLADDER: Small diverticulum on the right.    REPRODUCTIVE ORGANS: Prostatomegaly    ABDOMINAL WALL: Unremarkable.    BONES: Postop recent sternotomy.  Degenerative change at the lumbar spine.  Impression: 1. Postsurgical change of recent sternotomy with expected scant pneumomediastinum.  2. Small bilateral pleural effusions with mild loculation on the left.  3. The preliminary and final reports are concordant.    Electronically signed by: Promise Hall  Date:    01/15/2025  Time:    08:31  CT Cervical Spine Without Contrast  Narrative: EXAMINATION:  CT CERVICAL SPINE WITHOUT CONTRAST    CLINICAL HISTORY:  Trauma;    TECHNIQUE:  Helical acquisition through the cervical spine without IV contrast. Three plane reconstructions were made available for review. DLP 1329 mGycm. Automatic exposure control, adjustment of mA/kV or iterative reconstruction technique was used to reduce radiation.    COMPARISON:  None available.    FINDINGS:  No fractures  identified. No subluxation. Craniocervical junction and C1-C2 relationship are normal. The odontoid is intact. There is limited evaluation of the soft tissues. No prevertebral soft tissue swelling. Ligamentous injury cannot be excluded with CT.  There are moderate degenerative changes.  There are vascular calcifications.  Chest discussed in a separate report.  Impression: No acute bony injury of the cervical spine.    No significant discrepancy with the preliminary report.    Electronically signed by: Velasquez Leggett  Date:    01/15/2025  Time:    08:24  CT Head Without Contrast  Narrative: Technique: CT of the head was performed without intravenous contrast with axial as well as coronal and sagittal images.    Comparison: None.    Dosage Information: Automated exposure control was utilized.  DLP 1329    Clinical history: Trauma level 2, syncopal episode, head and neck trauma, swelling to left eye.    Findings:    Hemorrhage: No acute intracranial hemorrhage is seen.    CSF spaces: The ventricles, sulci and basal cisterns all appear moderately prominent consistent with global cerebral atrophy.    Brain parenchyma: Chronic microvascular change is seen in portions of the periventricular and deep white matter tracts. Note is made of chronic lacunar infarcts and mild gliotic changes in bilateral capsuloganglionic regions.    Cerebellum: Unremarkable.    Sella and skull base: The sella appears to be within normal limits for age.    Calvarium: No acute linear or depressed skull fracture is seen.    Maxillofacial Structures:    Paranasal sinuses: Note is made of left maxillary hemosinus as discussed below.    Orbits: There is a fracture of the floor of the left orbit with possible injury to the left inferior rectus muscle seen on series 8 image 18-20 and high attenuation contents in the left maxillary sinus, consistent with hemosinus. There is left preorbital preseptal swelling with subcutaneous emphysema and associated air  foci within the left orbit and left maxillary sinus.  Impression: Impression:    1. There is a fracture of the floor of the left orbit with possible injury to the left inferior rectus muscle seen on series 8 image 18-20 and high attenuation contents in the left maxillary sinus, consistent with hemosinus. There is left preorbital preseptal swelling with subcutaneous emphysema and associated air foci within the left orbit and left maxillary sinus.    2. No acute intracranial process identified. Details and other findings as noted above.    No significant discrepancy with overnight report.    Electronically signed by: Jeovanny Hatch  Date:    01/15/2025  Time:    08:10  X-Ray Pelvis Routine AP  Narrative: EXAMINATION:  XR PELVIS ROUTINE AP    CLINICAL HISTORY:  r/o bleeding or hemorrhage;    COMPARISON:  None.    FINDINGS:  No acute displaced fractures or dislocations.    There is some narrowing of the inferior medial aspect of both hip joints with more significant degenerative changes of the left sacroiliac joint and lumbosacral spine articular spaces otherwise preserved with smooth articular surfaces    No blastic or lytic lesions.    Soft tissues within normal limits.  Impression: Degenerative changes.    Electronically signed by: Enoc Andersen  Date:    01/15/2025  Time:    07:18  X-Ray Chest 1 View  Narrative: EXAMINATION:  XR CHEST 1 VIEW    CPT 10236    CLINICAL HISTORY:  r/o bleeding or hemorrhage;    COMPARISON:  January 10, 2025    FINDINGS:  Examination reveals mediastinal silhouette to be within normal limits cardiac silhouette is enlarged there is increase in interstitial and pulmonary vascular markings indicating the presence of pulmonary vascular congestion and cardiac decompensation.    There is blunting of the left costophrenic angle indicating the presence of left-sided pleural effusion.  There is increased left retrocardiac density and silhouetting of the left hemidiaphragm although these might be  related to pleural fluid it may also represent an infiltrate/atelectasis.    No focal consolidative changes are otherwise identified  Impression: .  Cardiomegaly.    Changes of left-sided pleural effusion with increased left retrocardiac density and silhouetting of the left hemidiaphragm.    Changes suggestive of a degree of pulmonary vascular congestion and cardiac decompensation    Electronically signed by: Enoc Andersen  Date:    01/15/2025  Time:    05:29      Jeremiah Palacios MD  Department of Hospital Medicine   [facility]   01/16/2025

## 2025-01-16 NOTE — H&P
[facility] Medicine History & Physical Examination       Patient Name: Leonidas Bautista  MRN: [id number]  Patient Class: IP- Inpatient   Admission Date: 1/14/2025   Admitting Physician: CLARENCE Service   Length of Stay: 1  Attending Physician: Shawn Pruitt MD  Primary Care Provider: Ann, Primary Doctor  Face-to-Face encounter date: 01/16/2025  Code Status: full  Chief Complaint: No chief complaint on file.      Screening for Social Drivers for health:  Patient screened for food insecurity, housing instability, transportation needs, utility difficulties, and interpersonal safety (select all that apply as identified as concern)  []Housing or Food  []Transportation Needs  []Utility Difficulties  []Interpersonal safety  [x]None      Patient information was obtained from patient, patient's family, past medical records and ER records.  ED records were reviewed in detail and documented below    HISTORY OF PRESENT ILLNESS:   73 yo male with PMHx of HTN, HLD, CAD s/p prior stent, recent CABG 4 vessel on 1/7/25 on ASA/Plavix, current smoker, COPD , Carotid artery disease, Thyroid disease/ Grave's disease, CHF, Pul HTN, PAD, CKD 3b, PAF on Eliquis presented to the ED as a level 2 trauma following a fall at home. Reportedly Pt was sitting in the bed and was having palpitation. When stood up he fell forward and face down on the floor. Subsequently developed nose bleed and left periorbital swelling with blur vision and associated confusion. Pt with h/o new onset PAF following CABG on 1/7/25 at [facility]. C/O increased SOB on exertion and episodes of palpitation since being discharged from OSH following CABG. Denies associated chest pain, nausea, vomiting or diaphoresis. EMS reported hypotension en route otherwise GCS was 15. Upon arrival to the ED Pt was hemodynamically stable with /78, HR 73, RR 20, SpO2 96% and afebrile. EKG with afib, non specific T wave abnormality and prolonged  QT. CT head with left orbit floor fracture with injury to the left inferior rectus muscle, left maxillary hemosinus, left periorbital subcutaneous emphysema and associated air foci within the left orbit and left maxillary sinus, no intra cranial process. CT C spine with no fracture, CT chest abd pelvis with evidence of pul vascular congestion, small eliu pleural effusion, scanty expected pneumomediastinum post recent CABG, and no pelvic fracture identified. Labs showed leukocytosis, Hgb 8.0, Cr 1.6, Na 139, K 4.8, , Troponin 0.203>0.184, lactic 1.1, UDS positive for marijuana, UA without infection. Pt was evaluated by Trauma surgery and subsequently admitted to  service. Cardiology and Plastic Surgery were consulted. Plastics recommended non operative management of left facial/ orbital floor fracture. SLP cleared Pt for regular consistency diet. Cardiology recommended telemetry, echocardiogram, trend troponin, diuresis as indicated and continue current home meds as appropriate.      PAST MEDICAL HISTORY:     Past Medical History:   Diagnosis Date    Abnormal nuclear stress test 01/05/2025    Bilateral carotid artery stenosis 01/05/2025    CAD S/P percutaneous coronary angioplasty 2003    Stent mid LAD July 15, 2003    Carotid artery disease     Coronary artery disease involving native coronary artery of native heart without angina pectoris 01/05/2025    Diastolic dysfunction     Essential (primary) hypertension     Resistent    Graves disease     hyperthroidism    Hyperlipidemia     Hypertensive heart disease     Other hyperlipidemia 01/05/2025    Presence of drug coated stent in LAD coronary artery 01/05/2025    Primary hypertension 01/05/2025       PAST SURGICAL HISTORY:     Past Surgical History:   Procedure Laterality Date    ANGIOGRAPHY OF INTERNAL MAMMARY VESSEL Left 1/6/2025    Procedure: Angiogram Internal Mammary;  Surgeon: Erika Gomes MD;  Location: [facility] CATH LAB;  Service: Cardiology;   Laterality: Left;    ARTERIOGRAPHY OF SUBCLAVIAN ARTERY Left 1/6/2025    Procedure: ARTERIOGRAM, SUBCLAVIAN;  Surgeon: Erika Gomes MD;  Location: [facility] CATH LAB;  Service: Cardiology;  Laterality: Left;    CORONARY ARTERY BYPASS GRAFT (CABG) N/A 1/7/2025    Procedure: CORONARY ARTERY BYPASS GRAFT (CABG);  Surgeon: Sidra Guardado MD;  Location: [facility] OR;  Service: Cardiothoracic;  Laterality: N/A;  CABG x    ECHOCARDIOGRAM,TRANSESOPHAGEAL N/A 1/7/2025    Procedure: ECHOCARDIOGRAM,TRANSESOPHAGEAL;  Surgeon: Sidra Guardado MD;  Location: [facility] OR;  Service: Cardiothoracic;  Laterality: N/A;    ENDOSCOPIC HARVEST OF VEIN Right 1/7/2025    Procedure: SURGICAL PROCUREMENT, VEIN, ENDOSCOPIC;  Surgeon: Sidra Guardado MD;  Location: [facility] OR;  Service: Cardiothoracic;  Laterality: Right;    INJECTION OF ANESTHETIC AGENT AROUND MULTIPLE INTERCOSTAL NERVES N/A 1/7/2025    Procedure: BLOCK, NERVE, INTERCOSTAL, 2 OR MORE;  Surgeon: Sidra Guardado MD;  Location: [facility] OR;  Service: Cardiothoracic;  Laterality: N/A;  Para sternal nerve lock    LEFT HEART CATHETERIZATION Left 1/6/2025    Procedure: Left heart cath;  Surgeon: Erika Gomes MD;  Location: [facility] CATH LAB;  Service: Cardiology;  Laterality: Left;    ptca with Stent mid LAD in 2003 N/A        ALLERGIES:   Patient has no known allergies.    FAMILY HISTORY:   Reviewed and negative    SOCIAL HISTORY:     Social History     Tobacco Use    Smoking status: Every Day     Types: Cigarettes    Smokeless tobacco: Never   Substance Use Topics    Alcohol use: Not on file        HOME MEDICATIONS:     Prior to Admission medications    Medication Sig Start Date End Date Taking? Authorizing Provider   amiodarone (PACERONE) 200 MG Tab Take 1 tablet (200 mg total) by mouth once daily. 1/12/25 1/12/26 Yes Sidra Guardado MD   apixaban (ELIQUIS) 5 mg Tab Take 1 tablet (5 mg total) by mouth 2 (two) times daily. 1/11/25  Yes Sidra Guardado MD   aspirin (ECOTRIN) 81  MG EC tablet Take 81 mg by mouth once daily.   Yes Provider, Historical   atorvastatin (LIPITOR) 40 MG tablet Take 1 tablet (40 mg total) by mouth every evening. 1/11/25 1/11/26 Yes Sidra Guardado MD   clopidogreL (PLAVIX) 75 mg tablet Take 1 tablet (75 mg total) by mouth once daily. 1/12/25 1/12/26 Yes Sidra Guardado MD   isosorbide-hydrALAZINE 20-37.5 mg (BIDIL) 20-37.5 mg Tab Take 1 tablet by mouth 3 (three) times daily. 12/17/24  Yes Provider, Historical   losartan (COZAAR) 100 MG tablet Take 100 mg by mouth once daily. 12/25/24  Yes Provider, Historical   methIMAzole (TAPAZOLE) 5 MG Tab Take 10 mg by mouth once daily. 11/2/24  Yes Provider, Historical   metoprolol succinate (TOPROL-XL) 50 MG 24 hr tablet Take 50 mg by mouth once daily. 12/2/24  Yes Provider, Historical   NIFEdipine (PROCARDIA-XL) 30 MG (OSM) 24 hr tablet Take 30 mg by mouth every evening. 12/21/24  Yes Provider, Historical   nitroGLYCERIN 0.4 MG/DOSE TL SPRY (NITROLINGUAL) 400 mcg/spray spray Place 1 spray under the tongue every 5 (five) minutes as needed for Chest pain. 12/23/24  Yes Provider, Historical   omega-3 fatty acids/fish oil (FISH OIL-OMEGA-3 FATTY ACIDS) 300-1,000 mg capsule Take 1 capsule by mouth once daily.    Provider, Historical   oxyCODONE (ROXICODONE) 5 MG immediate release tablet Take 1 tablet (5 mg total) by mouth every 4 (four) hours as needed for Pain. 1/11/25   Sidra Guardado MD       REVIEW OF SYSTEMS:   Except as documented, all other systems reviewed and negative     PHYSICAL EXAM:     VITAL SIGNS: 24 HRS MIN & MAX LAST   Temp  Min: 97.7 °F (36.5 °C)  Max: 98.7 °F (37.1 °C) 97.7 °F (36.5 °C)   BP  Min: 96/67  Max: 156/79 119/77   Pulse  Min: 63  Max: 81  76   Resp  Min: 18  Max: 26 18   SpO2  Min: 90 %  Max: 100 % 97 %     General appearance: Well-developed, well-nourished male in no apparent distress.  HENT: Left periorbital swelling and ecchymoses. Moist mucous membranes of oral cavity.  Eyes: Normal  extraocular movements.   Neck: Supple.   Lungs: Decreased breath sounds at bases eliu   Heart: Irregular rhythm, S1 and S2 present with no murmurs/gallop/rub. (+) 1+ lower ext and  pedal edema. No JVD present.   Abdomen: Soft, non-distended, non-tender. No rebound tenderness/guarding. Bowel sounds are normal.   Extremities: 1+ lower ext /pedal edema  Skin: No Rash.   Neuro: Motor and sensory exams grossly intact. Good tone. Muscle strength 5/5 in all 4 extremities  Psych/mental status: Appropriate mood and affect. Responds appropriately to questions.     LABS AND IMAGING:     Recent Labs   Lab 01/10/25  0517 01/14/25  2243 01/15/25  0759   WBC 18.06* 14.00* 13.66*   RBC 2.68* 2.32* 2.41*   HGB 9.2* 8.0* 8.3*   HCT 28.2* 24.3* 25.2*   * 104.7* 104.6*   MCH 34.3* 34.5* 34.4*   MCHC 32.6 32.9* 32.9*   RDW 14.3 14.5 14.6   * 327 341   MPV 10.9 9.5 9.7       Recent Labs   Lab 01/09/25  0447 01/09/25  1703 01/10/25  0517 01/10/25  1654 01/11/25  0604 01/14/25  2242 01/15/25  0759     --  136  --   --  139 141   K 4.3  --  4.4  --   --  4.8 4.3     --  103  --   --  109* 109*   CO2 26  --  20*  --   --  23 26   ANIONGAP 6*  --  13  --   --   --   --    BUN 36*  --  52*  --   --  34.6* 30.4*   CREATININE 2.2*  --  2.1*  --   --  1.69* 1.82*   *  --  106  --   --   --   --    CALCIUM 9.6  --  9.7  --   --  8.4* 8.5*   MG 2.4   < > 3.1*   < > 2.9* 2.30 2.10   ALBUMIN 3.3*  --  3.2*  --   --  2.8*  --    PROT 4.8*  --  5.6*  --   --   --   --    ALKPHOS 55  --  63  --   --  73  --    ALT 11  --  16  --   --  25  --    AST 45*  --  56*  --   --  35*  --    BILITOT 0.4  --  0.5  --   --  0.4  --     < > = values in this interval not displayed.       Microbiology Results (last 7 days)       ** No results found for the last 168 hours. **             Echo    Technically difficult study.    Left Ventricle: The left ventricle is normal in size. Increased wall   thickness. There is normal systolic  function with a visually estimated   ejection fraction of 55 - 60%.    Right Ventricle: Right ventricle was not well visualized due to poor   acoustic window.    Aortic Valve: There is no stenosis. There is no significant   regurgitation.    Mitral Valve: There is mild regurgitation.    Tricuspid Valve: There is trace regurgitation.    Pericardium: There is no pericardial effusion. Left pleural effusion.  CT Chest Abdomen Pelvis With IV Contrast (XPD) NO Oral Contrast  Narrative: EXAMINATION:  CT CHEST ABDOMEN PELVIS WITH IV CONTRAST (XPD)    CLINICAL HISTORY:  Trauma;    TECHNIQUE:  Helical acquisition with axial, sagittal and coronal reformations obtained from the thoracic inlet to the pubic symphysis following the IV administration of contrast.    Automated tube current modulation, weight-based exposure dosing, and/or iterative reconstruction technique utilized to reach lowest reasonably achievable exposure rate.    DLP: 1330 mGy*cm    COMPARISON:  Chest radiograph 01/07/2025    FINDINGS:  CHEST:    BASE OF NECK: Mild thyromegaly.    HEART: Mild cardiomegaly.  There are postsurgical changes of recent coronary artery bypass grafting.  There are atherosclerotic calcifications of the native coronary arteries.    THORACIC VASCULATURE: Aortic atherosclerosis.  Ascending aorta measures 4.6 cm in diameter.  Calcified outpouching at the proximal descending aorta may be related to ductus bump, ulcer aneurysm...Pulmonary arteries enhance normally.    TRICIA/MEDIASTINUM: Changes compatible with recent sternotomy.  Scant pneumomediastinum.  No significant hematoma or collection.    AIRWAYS: Patent.    LUNGS/PLEURA: Small pleural effusions.  Left pleural effusion is mildly loculated.  Right pleural effusion layers dependently.  Mild atelectasis.    THORACIC SOFT TISSUES: Unremarkable.    ABDOMEN PELVIS:    LIVER: Normal attenuation. No appreciable focal hepatic lesion.    BILIARY: Cholelithiasis.    PANCREAS: No inflammatory  change.    SPLEEN: Normal in size    ADRENALS: No mass.    KIDNEYS/URETERS: No hydronephrosis.  There are renal cortical cysts and too small to characterize renal cortical hypodensities.    GI TRACT/MESENTERY:  No evidence of bowel obstruction or inflammation.     Colonic diverticulosis without acute inflammatory change.    PERITONEUM: No free fluid.No free air.    ABDOMINOPELVIC LYMPH NODES: No enlarged lymph nodes by size criteria.    ABDOMINOPELVIC VASCULATURE: Aortoiliac atherosclerosis.    BLADDER: Small diverticulum on the right.    REPRODUCTIVE ORGANS: Prostatomegaly    ABDOMINAL WALL: Unremarkable.    BONES: Postop recent sternotomy.  Degenerative change at the lumbar spine.  Impression: 1. Postsurgical change of recent sternotomy with expected scant pneumomediastinum.  2. Small bilateral pleural effusions with mild loculation on the left.  3. The preliminary and final reports are concordant.    Electronically signed by: Promise Hall  Date:    01/15/2025  Time:    08:31  CT Cervical Spine Without Contrast  Narrative: EXAMINATION:  CT CERVICAL SPINE WITHOUT CONTRAST    CLINICAL HISTORY:  Trauma;    TECHNIQUE:  Helical acquisition through the cervical spine without IV contrast. Three plane reconstructions were made available for review. DLP 1329 mGycm. Automatic exposure control, adjustment of mA/kV or iterative reconstruction technique was used to reduce radiation.    COMPARISON:  None available.    FINDINGS:  No fractures identified. No subluxation. Craniocervical junction and C1-C2 relationship are normal. The odontoid is intact. There is limited evaluation of the soft tissues. No prevertebral soft tissue swelling. Ligamentous injury cannot be excluded with CT.  There are moderate degenerative changes.  There are vascular calcifications.  Chest discussed in a separate report.  Impression: No acute bony injury of the cervical spine.    No significant discrepancy with the preliminary  report.    Electronically signed by: Velasquez Leggett  Date:    01/15/2025  Time:    08:24  CT Head Without Contrast  Narrative: Technique: CT of the head was performed without intravenous contrast with axial as well as coronal and sagittal images.    Comparison: None.    Dosage Information: Automated exposure control was utilized.  DLP 1329    Clinical history: Trauma level 2, syncopal episode, head and neck trauma, swelling to left eye.    Findings:    Hemorrhage: No acute intracranial hemorrhage is seen.    CSF spaces: The ventricles, sulci and basal cisterns all appear moderately prominent consistent with global cerebral atrophy.    Brain parenchyma: Chronic microvascular change is seen in portions of the periventricular and deep white matter tracts. Note is made of chronic lacunar infarcts and mild gliotic changes in bilateral capsuloganglionic regions.    Cerebellum: Unremarkable.    Sella and skull base: The sella appears to be within normal limits for age.    Calvarium: No acute linear or depressed skull fracture is seen.    Maxillofacial Structures:    Paranasal sinuses: Note is made of left maxillary hemosinus as discussed below.    Orbits: There is a fracture of the floor of the left orbit with possible injury to the left inferior rectus muscle seen on series 8 image 18-20 and high attenuation contents in the left maxillary sinus, consistent with hemosinus. There is left preorbital preseptal swelling with subcutaneous emphysema and associated air foci within the left orbit and left maxillary sinus.  Impression: Impression:    1. There is a fracture of the floor of the left orbit with possible injury to the left inferior rectus muscle seen on series 8 image 18-20 and high attenuation contents in the left maxillary sinus, consistent with hemosinus. There is left preorbital preseptal swelling with subcutaneous emphysema and associated air foci within the left orbit and left maxillary sinus.    2. No acute  intracranial process identified. Details and other findings as noted above.    No significant discrepancy with overnight report.    Electronically signed by: Jeovanny Hatch  Date:    01/15/2025  Time:    08:10  X-Ray Pelvis Routine AP  Narrative: EXAMINATION:  XR PELVIS ROUTINE AP    CLINICAL HISTORY:  r/o bleeding or hemorrhage;    COMPARISON:  None.    FINDINGS:  No acute displaced fractures or dislocations.    There is some narrowing of the inferior medial aspect of both hip joints with more significant degenerative changes of the left sacroiliac joint and lumbosacral spine articular spaces otherwise preserved with smooth articular surfaces    No blastic or lytic lesions.    Soft tissues within normal limits.  Impression: Degenerative changes.    Electronically signed by: Enoc Andersen  Date:    01/15/2025  Time:    07:18  X-Ray Chest 1 View  Narrative: EXAMINATION:  XR CHEST 1 VIEW    CPT 75286    CLINICAL HISTORY:  r/o bleeding or hemorrhage;    COMPARISON:  January 10, 2025    FINDINGS:  Examination reveals mediastinal silhouette to be within normal limits cardiac silhouette is enlarged there is increase in interstitial and pulmonary vascular markings indicating the presence of pulmonary vascular congestion and cardiac decompensation.    There is blunting of the left costophrenic angle indicating the presence of left-sided pleural effusion.  There is increased left retrocardiac density and silhouetting of the left hemidiaphragm although these might be related to pleural fluid it may also represent an infiltrate/atelectasis.    No focal consolidative changes are otherwise identified  Impression: .  Cardiomegaly.    Changes of left-sided pleural effusion with increased left retrocardiac density and silhouetting of the left hemidiaphragm.    Changes suggestive of a degree of pulmonary vascular congestion and cardiac decompensation    Electronically signed by: Enoc  Pawelshlomo  Date:    01/15/2025  Time:    05:29      ASSESSMENT & PLAN:   Syncope and collapse, POA  Left sided facial/ left orbital floor fracture due to fall, POA  Paroxysmal atrial fibrillation with CVR, POA  Elevated troponin / NSTEMI type 2 in the setting of PAF and recent CABG, POA  Acute on chronic diastolic heart failure, LVEF 55-60%  Leukocytosis , likely reactive - POA  Anemia of chronic disease with mild macrocytosis, moderate - POA  Chronic kidney disease, stage IIIb-  stable   Elevated LFT's - mild , POA  Current everyday smoker / Marijuana use     Hx- HTN, HLD, CAD s/p prior stent, recent CABG 4 vessel on 1/7/25 on ASA/Plavix, current smoker, COPD , Carotid artery disease, Thyroid disease/ Grave's disease, CHF, Pul HTN, PAD, CKD 3b, PAF    Plan-   Continue telemetry monitoring   Currently in A.fib. Rate is controlled   Trend troponin   Resume ASA/ Plavix/Eliquis, Statin, BB, Bidil, and Amiodarone   IV Furosemide for diuresis and monitor response  Monitor UOP, Volume status, renal parameter   Monitor electrolytes and maintain K>4, Mg >2.0  Supportive treatment as indicated   PT/OT consult     Critical care note:  Critical care diagnosis: HF requiring IV diuresis   Critical care interventions: Hands-on evaluation, review of labs/radiographs/records and discussion with patient and family if present  Critical care time spent: 35 minutes    Assistance with smoking cessation was offered, including:  []  Medications  [x]  Counseling  []  Printed Information on Smoking Cessation  []  Referral to a Smoking Cessation Program    Patient was counseled regarding smoking for >10 minutes.        VTE Prophylaxis:  Eliquis    Patient condition:  Fair     __________________________________________________________________________  INPATIENT LIST OF MEDICATIONS     Scheduled Meds:   amiodarone  200 mg Oral Daily    atorvastatin  40 mg Oral QHS    furosemide (LASIX) injection  40 mg Intravenous Q12H    hydrALAZINE 10 mg 1  tablet, hydrALAZINE 25 mg 1 tablet, isorsorbide dinitrate 20 mg 1 tablet combination   Oral TID    methIMAzole  10 mg Oral Daily    metoprolol succinate  50 mg Oral Daily    NIFEdipine  30 mg Oral QHS     Continuous Infusions:  PRN Meds:.  Current Facility-Administered Medications:     acetaminophen, 1,000 mg, Oral, Q6H PRN    aluminum-magnesium hydroxide-simethicone, 30 mL, Oral, QID PRN    bisacodyL, 10 mg, Rectal, Daily PRN    dextrose 50%, 12.5 g, Intravenous, PRN    dextrose 50%, 25 g, Intravenous, PRN    glucagon (human recombinant), 1 mg, Intramuscular, PRN    glucose, 16 g, Oral, PRN    glucose, 24 g, Oral, PRN    melatonin, 6 mg, Oral, Nightly PRN    ondansetron, 4 mg, Intravenous, Q4H PRN    oxyCODONE, 5 mg, Oral, Q4H PRN    polyethylene glycol, 17 g, Oral, BID PRN    sodium chloride 0.9%, 10 mL, Intravenous, PRN        Discharge Planning and Disposition: No mobility needs. Ambulating well. Good social support system.   Anticipated discharge    If patient was admitted under observational status it is with my approval/permission.        All diagnosis and differential diagnosis have been reviewed; assessment and plan has been documented; I have personally reviewed the labs and test results that are presently available; I have reviewed the patients medication list; I have reviewed the consulting providers response and recommendations. I have reviewed or attempted to review medical records based upon their availability.    All of the patient and family questions have been addressed and answered. Patient's is agreeable to the above stated plan. I will continue to monitor closely and make adjustments to medical management as needed.    If patient was admitted under observational status it is with my approval/permission.      Shawn Pruitt MD   01/15/2025

## 2025-01-16 NOTE — AI DETERIORATION ALERT
Artificial Intelligence Notification  [facility]  [address] [place] [address]  Phone: [phone]    This documentation was triggered by an Artificial Intelligence Notification:    Admit Date: 2025   LOS: 1  Code Status: Full Code  : 1950  Age: 74 y.o.  Weight:   Wt Readings from Last 1 Encounters:   01/15/25 77.1 kg (170 lb)        Sex: male  Bed: 408/408 A  MRN: [id number]  Attending Physician: Reyes, Thairy G, DO     Date of Alert: 2025  Time AI Alert Received: 1133            Vitals:    25 1119   BP: 114/74   Pulse: (!) 222   Resp:    Temp: 98.3 °F (36.8 °C)     SpO2: (!) 79 %      Artificial Intelligence alert discussed with Provider:     Name: JOSSELIN Agarwal   Date/Time of Provider Notification: 25 1142      Patient Condition: When I rounded on pt HR 70 and SpO2 96%. Pt resting no distress noted. Spoke with pt's RN who noted CNA is correcting the mischarted vitals. Call response if pt deteriorates, [phone]

## 2025-01-16 NOTE — PLAN OF CARE
Problem: Occupational Therapy  Goal: Occupational Therapy Goal  Description: LTG: Pt will perform basic ADLs and ADL transfers with Modified independence and good compliance to sternal precautions using LRAD by discharge.    STG: to be met by 1/30/25    Pt will complete grooming standing at sink with LRAD with SBA.  Pt will complete UB dressing with SBA.  Pt will complete LB dressing with SBA using LRAD and AE prn.  Pt will complete toileting with SBA using LRAD.  Pt will complete functional mobility to/from toilet and toilet transfer with SBA using LRAD.   Pt will independently verbalize sternal precautions with >90% accuracy.   Outcome: Progressing

## 2025-01-17 VITALS
RESPIRATION RATE: 20 BRPM | OXYGEN SATURATION: 93 % | BODY MASS INDEX: 24.34 KG/M2 | HEART RATE: 73 BPM | DIASTOLIC BLOOD PRESSURE: 67 MMHG | SYSTOLIC BLOOD PRESSURE: 115 MMHG | TEMPERATURE: 98 F | HEIGHT: 70 IN | WEIGHT: 170 LBS

## 2025-01-17 DIAGNOSIS — I65.23 BILATERAL CAROTID ARTERY STENOSIS: Primary | ICD-10-CM

## 2025-01-17 PROBLEM — R55 SYNCOPE: Status: ACTIVE | Noted: 2025-01-17

## 2025-01-17 LAB
ALBUMIN SERPL-MCNC: 2.9 G/DL (ref 3.4–4.8)
ALBUMIN/GLOB SERPL: 1.2 RATIO (ref 1.1–2)
ALP SERPL-CCNC: 77 UNIT/L (ref 40–150)
ALT SERPL-CCNC: 22 UNIT/L (ref 0–55)
ANION GAP SERPL CALC-SCNC: 9 MEQ/L
AST SERPL-CCNC: 24 UNIT/L (ref 5–34)
BASOPHILS # BLD AUTO: 0.03 X10(3)/MCL
BASOPHILS NFR BLD AUTO: 0.3 %
BILIRUB SERPL-MCNC: 0.4 MG/DL
BUN SERPL-MCNC: 31.6 MG/DL (ref 8.4–25.7)
CALCIUM SERPL-MCNC: 8.6 MG/DL (ref 8.8–10)
CHLORIDE SERPL-SCNC: 104 MMOL/L (ref 98–107)
CO2 SERPL-SCNC: 28 MMOL/L (ref 23–31)
CREAT SERPL-MCNC: 2.13 MG/DL (ref 0.72–1.25)
CREAT/UREA NIT SERPL: 15
EOSINOPHIL # BLD AUTO: 0.18 X10(3)/MCL (ref 0–0.9)
EOSINOPHIL NFR BLD AUTO: 1.6 %
ERYTHROCYTE [DISTWIDTH] IN BLOOD BY AUTOMATED COUNT: 15.2 % (ref 11.5–17)
GFR SERPLBLD CREATININE-BSD FMLA CKD-EPI: 32 ML/MIN/1.73/M2
GLOBULIN SER-MCNC: 2.4 GM/DL (ref 2.4–3.5)
GLUCOSE SERPL-MCNC: 132 MG/DL (ref 82–115)
HCT VFR BLD AUTO: 27.1 % (ref 42–52)
HGB BLD-MCNC: 8.8 G/DL (ref 14–18)
IMM GRANULOCYTES # BLD AUTO: 0.05 X10(3)/MCL (ref 0–0.04)
IMM GRANULOCYTES NFR BLD AUTO: 0.5 %
LYMPHOCYTES # BLD AUTO: 1.65 X10(3)/MCL (ref 0.6–4.6)
LYMPHOCYTES NFR BLD AUTO: 14.9 %
MCH RBC QN AUTO: 33.8 PG (ref 27–31)
MCHC RBC AUTO-ENTMCNC: 32.5 G/DL (ref 33–36)
MCV RBC AUTO: 104.2 FL (ref 80–94)
MONOCYTES # BLD AUTO: 0.92 X10(3)/MCL (ref 0.1–1.3)
MONOCYTES NFR BLD AUTO: 8.3 %
NEUTROPHILS # BLD AUTO: 8.27 X10(3)/MCL (ref 2.1–9.2)
NEUTROPHILS NFR BLD AUTO: 74.4 %
NRBC BLD AUTO-RTO: 0.4 %
PLATELET # BLD AUTO: 450 X10(3)/MCL (ref 130–400)
PMV BLD AUTO: 8.7 FL (ref 7.4–10.4)
POTASSIUM SERPL-SCNC: 3.7 MMOL/L (ref 3.5–5.1)
PROT SERPL-MCNC: 5.3 GM/DL (ref 5.8–7.6)
RBC # BLD AUTO: 2.6 X10(6)/MCL (ref 4.7–6.1)
SODIUM SERPL-SCNC: 141 MMOL/L (ref 136–145)
WBC # BLD AUTO: 11.1 X10(3)/MCL (ref 4.5–11.5)

## 2025-01-17 PROCEDURE — 36415 COLL VENOUS BLD VENIPUNCTURE: CPT | Performed by: STUDENT IN AN ORGANIZED HEALTH CARE EDUCATION/TRAINING PROGRAM

## 2025-01-17 PROCEDURE — 99223 1ST HOSP IP/OBS HIGH 75: CPT | Mod: ,,, | Performed by: SURGERY

## 2025-01-17 PROCEDURE — 25000003 PHARM REV CODE 250

## 2025-01-17 PROCEDURE — 25000003 PHARM REV CODE 250: Performed by: INTERNAL MEDICINE

## 2025-01-17 PROCEDURE — 25000242 PHARM REV CODE 250 ALT 637 W/ HCPCS: Performed by: INTERNAL MEDICINE

## 2025-01-17 PROCEDURE — 85025 COMPLETE CBC W/AUTO DIFF WBC: CPT | Performed by: STUDENT IN AN ORGANIZED HEALTH CARE EDUCATION/TRAINING PROGRAM

## 2025-01-17 PROCEDURE — 80053 COMPREHEN METABOLIC PANEL: CPT | Performed by: STUDENT IN AN ORGANIZED HEALTH CARE EDUCATION/TRAINING PROGRAM

## 2025-01-17 RX ORDER — FLUTICASONE FUROATE AND VILANTEROL 100; 25 UG/1; UG/1
1 POWDER RESPIRATORY (INHALATION) DAILY
Qty: 60 EACH | Refills: 1 | Status: SHIPPED | OUTPATIENT
Start: 2025-01-18

## 2025-01-17 RX ORDER — FUROSEMIDE 20 MG/1
20 TABLET ORAL DAILY
Qty: 30 TABLET | Refills: 11 | Status: SHIPPED | OUTPATIENT
Start: 2025-01-18 | End: 2025-01-27

## 2025-01-17 RX ADMIN — METHIMAZOLE 10 MG: 10 TABLET ORAL at 08:01

## 2025-01-17 RX ADMIN — FUROSEMIDE 20 MG: 20 TABLET ORAL at 08:01

## 2025-01-17 RX ADMIN — CLOPIDOGREL BISULFATE 75 MG: 75 TABLET ORAL at 08:01

## 2025-01-17 RX ADMIN — AMIODARONE HYDROCHLORIDE 200 MG: 200 TABLET ORAL at 08:01

## 2025-01-17 RX ADMIN — APIXABAN 5 MG: 5 TABLET, FILM COATED ORAL at 08:01

## 2025-01-17 RX ADMIN — METOPROLOL SUCCINATE 50 MG: 50 TABLET, EXTENDED RELEASE ORAL at 08:01

## 2025-01-17 RX ADMIN — ISOSORBIDE DINITRATE: 20 TABLET ORAL at 08:01

## 2025-01-17 RX ADMIN — FLUTICASONE FUROATE AND VILANTEROL TRIFENATATE 1 PUFF: 100; 25 POWDER RESPIRATORY (INHALATION) at 08:01

## 2025-01-17 NOTE — PROGRESS NOTES
AWAITING TO 87% ON RA WHILE AT REST.  REQUIRING 2 L O2 VIA NC FOR SATURATIONS IMPROVED TO 92%.  WILL CONSULT CASE MANAGEMENT FOR HOME OXYGEN.

## 2025-01-17 NOTE — CONSULTS
[facility]  Consult Note      SUBJECTIVE:     Chief Complaint/Reason for Visit:  Syncopal episode     History of Present Illness:  Leonidas Bautista is a 74 y.o. male who presents after a syncopal episode.  He is status post CABG in [place] on January 6th.  He reported he was at home and had what sounds like a syncopal episode and fell and hit his face.  He presented as a trauma activation.  I was consulted because he had a carotid duplex suggestive of a moderate stenosis on the left.  On questioning he denies symptoms of stroke or TIA including unilateral weakness, facial asymmetry, speech difficulties, or amaurosis.  He does smoke but reports that he has quit since his heart surgery about a week and a half ago.  He did develop paroxysmal AFib postprocedure.  He is now on amiodarone and Eliquis.    Review of patient's allergies indicates:  No Known Allergies    Past Medical History:   Diagnosis Date    Abnormal nuclear stress test 01/05/2025    Bilateral carotid artery stenosis 01/05/2025    CAD S/P percutaneous coronary angioplasty 2003    Stent mid LAD July 15, 2003    Carotid artery disease     Coronary artery disease involving native coronary artery of native heart without angina pectoris 01/05/2025    Diastolic dysfunction     Essential (primary) hypertension     Resistent    Graves disease     hyperthroidism    Hyperlipidemia     Hypertensive heart disease     Other hyperlipidemia 01/05/2025    Presence of drug coated stent in LAD coronary artery 01/05/2025    Primary hypertension 01/05/2025     Past Surgical History:   Procedure Laterality Date    ANGIOGRAPHY OF INTERNAL MAMMARY VESSEL Left 1/6/2025    Procedure: Angiogram Internal Mammary;  Surgeon: Erika Gomes MD;  Location: [facility] CATH LAB;  Service: Cardiology;  Laterality: Left;    ARTERIOGRAPHY OF SUBCLAVIAN ARTERY Left 1/6/2025    Procedure: ARTERIOGRAM, SUBCLAVIAN;  Surgeon: Erika Gomes MD;  Location: [facility] CATH LAB;  Service:  Cardiology;  Laterality: Left;    CORONARY ARTERY BYPASS GRAFT (CABG) N/A 1/7/2025    Procedure: CORONARY ARTERY BYPASS GRAFT (CABG);  Surgeon: Sidra Guardado MD;  Location: [facility] OR;  Service: Cardiothoracic;  Laterality: N/A;  CABG x    ECHOCARDIOGRAM,TRANSESOPHAGEAL N/A 1/7/2025    Procedure: ECHOCARDIOGRAM,TRANSESOPHAGEAL;  Surgeon: Sidra Guardado MD;  Location: [facility] OR;  Service: Cardiothoracic;  Laterality: N/A;    ENDOSCOPIC HARVEST OF VEIN Right 1/7/2025    Procedure: SURGICAL PROCUREMENT, VEIN, ENDOSCOPIC;  Surgeon: Sidra Guardado MD;  Location: [facility] OR;  Service: Cardiothoracic;  Laterality: Right;    INJECTION OF ANESTHETIC AGENT AROUND MULTIPLE INTERCOSTAL NERVES N/A 1/7/2025    Procedure: BLOCK, NERVE, INTERCOSTAL, 2 OR MORE;  Surgeon: Sidra Guardado MD;  Location: [facility] OR;  Service: Cardiothoracic;  Laterality: N/A;  Para sternal nerve lock    LEFT HEART CATHETERIZATION Left 1/6/2025    Procedure: Left heart cath;  Surgeon: Erika Gomes MD;  Location: [facility] CATH LAB;  Service: Cardiology;  Laterality: Left;    ptca with Stent mid LAD in 2003 N/A      No family history on file.  Social History     Tobacco Use    Smoking status: Every Day     Types: Cigarettes    Smokeless tobacco: Never        Prior to Admission medications    Medication Sig Start Date End Date Taking? Authorizing Provider   amiodarone (PACERONE) 200 MG Tab Take 1 tablet (200 mg total) by mouth once daily. 1/12/25 1/12/26 Yes Sidra Guardado MD   apixaban (ELIQUIS) 5 mg Tab Take 1 tablet (5 mg total) by mouth 2 (two) times daily. 1/11/25  Yes Sidra Guardado MD   atorvastatin (LIPITOR) 40 MG tablet Take 1 tablet (40 mg total) by mouth every evening. 1/11/25 1/11/26 Yes Sidra Guardado MD   clopidogreL (PLAVIX) 75 mg tablet Take 1 tablet (75 mg total) by mouth once daily. 1/12/25 1/12/26 Yes Sidra Guardado MD   isosorbide-hydrALAZINE 20-37.5 mg (BIDIL) 20-37.5 mg Tab Take 1 tablet by mouth 3 (three) times  daily. 12/17/24  Yes Provider, Historical   methIMAzole (TAPAZOLE) 5 MG Tab Take 10 mg by mouth once daily. 11/2/24  Yes Provider, Historical   metoprolol succinate (TOPROL-XL) 50 MG 24 hr tablet Take 50 mg by mouth once daily. 12/2/24  Yes Provider, Historical   NIFEdipine (PROCARDIA-XL) 30 MG (OSM) 24 hr tablet Take 30 mg by mouth every evening. 12/21/24  Yes Provider, Historical   nitroGLYCERIN 0.4 MG/DOSE TL SPRY (NITROLINGUAL) 400 mcg/spray spray Place 1 spray under the tongue every 5 (five) minutes as needed for Chest pain. 12/23/24  Yes Provider, Historical   aspirin (ECOTRIN) 81 MG EC tablet Take 81 mg by mouth once daily.  1/17/25 Yes Provider, Historical   losartan (COZAAR) 100 MG tablet Take 100 mg by mouth once daily. 12/25/24 1/17/25 Yes Provider, Historical   fluticasone furoate-vilanteroL (BREO) 100-25 mcg/dose diskus inhaler Inhale 1 puff into the lungs once daily. Controller 1/18/25   Jeremiah Palacios MD   furosemide (LASIX) 20 MG tablet Take 1 tablet (20 mg total) by mouth once daily. 1/18/25 1/18/26  Jeremiah Palacios MD   omega-3 fatty acids/fish oil (FISH OIL-OMEGA-3 FATTY ACIDS) 300-1,000 mg capsule Take 1 capsule by mouth once daily.    Provider, Historical   oxyCODONE (ROXICODONE) 5 MG immediate release tablet Take 1 tablet (5 mg total) by mouth every 4 (four) hours as needed for Pain. 1/11/25   Sidra Guardado MD   tiotropium bromide (SPIRIVA RESPIMAT) 2.5 mcg/actuation inhaler Inhale 2 puffs into the lungs every evening. Controller 1/17/25   Jeremiah Palacios MD       Review of Systems:  Negative except as in HPI    OBJECTIVE:     Vital Signs (Most Recent):  Temp: 98 °F (36.7 °C) (01/17/25 1108)  Pulse: 73 (01/17/25 1108)  Resp: 20 (01/17/25 1108)  BP: 115/67 (01/17/25 1108)  SpO2: (!) 93 % (01/17/25 1108)    Admission: Weight: 77.1 kg (170 lb) (01/14/25 2233)   Most Recent: Weight: 77.1 kg (170 lb) (01/15/25 1644)    Physical Exam:  Vascular Physical Exam  Vitals and nursing note reviewed.    Constitutional:       General: He is awake.   Eyes:      Comments: Significant ecchymoses around the left eye.   Cardiovascular:      Rate and Rhythm: Normal rate and regular rhythm.      Pulses:           Radial pulses are 2+ on the right side and 2+ on the left side.        Brachial pulses are 2+ on the right side and 2+ on the left side.  Pulmonary:      Effort: Pulmonary effort is normal.      Breath sounds: Normal breath sounds and air entry.   Chest:      Chest wall: There is a sternotomy scar (sternotomy incision healing well).  Musculoskeletal:      Neck: Neck supple.   Neurological:      Mental Status: He is alert and oriented to person, place, and time.      Cranial Nerves: No cranial nerve deficit.      Sensory: Sensation is intact. No sensory deficit.      Motor: Motor function is intact. No weakness.      Upper extremity:  score is 5/5 on the right side and 5/5 on the left side.          Diagnostic Results:  US: Reviewed I reviewed the patient's ultrasound performed in the hospital today.  This shows less than 50% stenosis on the right and 50-69% stenosis on the left.  I also reviewed a carotid duplex performed on him in January 7th which shows less than 50% stenosis bilaterally.      ASSESSMENT/PLAN:     Assessment   74-year-old male with syncopal episode, left ICA stenosis, status post CABG, history of tobacco abuse  Plan   At this point it sounds like the patient's issues are a syncopal episode unrelated to any CVA or TIA.  His carotid stenosis barely across the threshold for moderate stenosis.  This is something that can be followed up as an outpatient.  I have discussed this with him.  He is on antiplatelet and statin therapies.  He states that he has stopped smoking since his heart surgery.  I would like him to follow up in 6 months with a carotid duplex.  We will schedule him as an outpatient.  Please call with any further concerns.      Gerry Suresh

## 2025-01-17 NOTE — PLAN OF CARE
[facility] is [facility] notified of discharge. Discharge information sent. Notified by nursing that patient O2 sat remained 92% and above during re-testing for home oxygen. No home oxygen needed.

## 2025-01-19 LAB
LEFT CCA DIST DIAS: 4 CM/S
LEFT CCA DIST SYS: 61 CM/S
LEFT CCA PROX DIAS: 9 CM/S
LEFT CCA PROX SYS: 118 CM/S
LEFT ECA DIAS: 0 CM/S
LEFT ECA SYS: 57 CM/S
LEFT ICA DIST DIAS: 18 CM/S
LEFT ICA DIST SYS: 85 CM/S
LEFT ICA MID DIAS: 17 CM/S
LEFT ICA MID SYS: 144 CM/S
LEFT ICA PROX DIAS: 14 CM/S
LEFT ICA PROX SYS: 70 CM/S
LEFT VERTEBRAL DIAS: 3 CM/S
LEFT VERTEBRAL SYS: 26 CM/S
OHS CV CAROTID RIGHT ICA EDV HIGHEST: 25
OHS CV CAROTID ULTRASOUND LEFT ICA/CCA RATIO: 2.36
OHS CV CAROTID ULTRASOUND RIGHT ICA/CCA RATIO: 1.69
OHS CV PV CAROTID LEFT HIGHEST CCA: 118
OHS CV PV CAROTID LEFT HIGHEST ICA: 144
OHS CV PV CAROTID RIGHT HIGHEST CCA: 114
OHS CV PV CAROTID RIGHT HIGHEST ICA: 113
OHS CV US CAROTID LEFT HIGHEST EDV: 18
RIGHT CCA DIST DIAS: 10 CM/S
RIGHT CCA DIST SYS: 67 CM/S
RIGHT CCA PROX DIAS: 14 CM/S
RIGHT CCA PROX SYS: 114 CM/S
RIGHT ECA DIAS: 18 CM/S
RIGHT ECA SYS: 66 CM/S
RIGHT ICA DIST DIAS: 22 CM/S
RIGHT ICA DIST SYS: 75 CM/S
RIGHT ICA MID DIAS: 15 CM/S
RIGHT ICA MID SYS: 61 CM/S
RIGHT ICA PROX DIAS: 25 CM/S
RIGHT ICA PROX SYS: 113 CM/S
RIGHT VERTEBRAL DIAS: 10 CM/S
RIGHT VERTEBRAL SYS: 44 CM/S

## 2025-01-23 ENCOUNTER — PATIENT OUTREACH (OUTPATIENT)
Dept: ADMINISTRATIVE | Facility: CLINIC | Age: 75
End: 2025-01-23
Payer: MEDICARE

## 2025-01-23 NOTE — PROGRESS NOTES
C3 nurse attempted to contact Leonidas Bautista for a TCC post hospital discharge follow up call. No answer. Voicemail left. The patient does not have a scheduled HOSFU appointment, and the pt does not have an [facility] PCP.

## 2025-01-23 NOTE — PROGRESS NOTES
2nd attempt-C3 nurse attempted to contact Leonidas Bautista for a TCC post hospital discharge follow up call. The patient was unable to conduct the call at this time and requested a call back on tomorrow. The patient does not have a scheduled HOSFU appointment, and the pt does not have an [facility] PCP.

## 2025-01-24 ENCOUNTER — HOSPITAL ENCOUNTER (INPATIENT)
Facility: HOSPITAL | Age: 75
LOS: 3 days | Discharge: HOME | DRG: 291 | End: 2025-01-27
Attending: EMERGENCY MEDICINE | Admitting: INTERNAL MEDICINE
Payer: MEDICARE

## 2025-01-24 DIAGNOSIS — R06.00 DYSPNEA: ICD-10-CM

## 2025-01-24 DIAGNOSIS — I50.9 ACUTE ON CHRONIC CONGESTIVE HEART FAILURE: ICD-10-CM

## 2025-01-24 DIAGNOSIS — I50.9 ACUTE ON CHRONIC CONGESTIVE HEART FAILURE, UNSPECIFIED HEART FAILURE TYPE: Primary | ICD-10-CM

## 2025-01-24 LAB
ALBUMIN SERPL BCP-MCNC: 3.3 G/DL (ref 3.5–5.2)
ALP SERPL-CCNC: 96 U/L (ref 55–135)
ALT SERPL W/O P-5'-P-CCNC: 31 U/L (ref 10–44)
ANION GAP SERPL CALC-SCNC: 8 MMOL/L (ref 8–16)
AST SERPL-CCNC: 48 U/L (ref 10–40)
BASOPHILS # BLD AUTO: 0.01 K/UL (ref 0–0.2)
BASOPHILS NFR BLD: 0.1 % (ref 0–1.9)
BILIRUB SERPL-MCNC: 0.3 MG/DL (ref 0.1–1)
BUN SERPL-MCNC: 26 MG/DL (ref 8–23)
CALCIUM SERPL-MCNC: 8.7 MG/DL (ref 8.7–10.5)
CHLORIDE SERPL-SCNC: 94 MMOL/L (ref 95–110)
CO2 SERPL-SCNC: 27 MMOL/L (ref 23–29)
CREAT SERPL-MCNC: 2 MG/DL (ref 0.5–1.4)
DIFFERENTIAL METHOD BLD: ABNORMAL
EOSINOPHIL # BLD AUTO: 0 K/UL (ref 0–0.5)
EOSINOPHIL NFR BLD: 0.1 % (ref 0–8)
ERYTHROCYTE [DISTWIDTH] IN BLOOD BY AUTOMATED COUNT: 14.3 % (ref 11.5–14.5)
EST. GFR  (NO RACE VARIABLE): 34.4 ML/MIN/1.73 M^2
GLUCOSE SERPL-MCNC: 134 MG/DL (ref 70–110)
HCT VFR BLD AUTO: 26.9 % (ref 40–54)
HGB BLD-MCNC: 8.5 G/DL (ref 14–18)
IMM GRANULOCYTES # BLD AUTO: 0.04 K/UL (ref 0–0.04)
IMM GRANULOCYTES NFR BLD AUTO: 0.3 % (ref 0–0.5)
LYMPHOCYTES # BLD AUTO: 0.7 K/UL (ref 1–4.8)
LYMPHOCYTES NFR BLD: 6.3 % (ref 18–48)
MCH RBC QN AUTO: 32.8 PG (ref 27–31)
MCHC RBC AUTO-ENTMCNC: 31.6 G/DL (ref 32–36)
MCV RBC AUTO: 104 FL (ref 82–98)
MONOCYTES # BLD AUTO: 0.5 K/UL (ref 0.3–1)
MONOCYTES NFR BLD: 4 % (ref 4–15)
NEUTROPHILS # BLD AUTO: 10.3 K/UL (ref 1.8–7.7)
NEUTROPHILS NFR BLD: 89.2 % (ref 38–73)
NRBC BLD-RTO: 0 /100 WBC
NT-PROBNP SERPL-MCNC: 7792 PG/ML (ref 5–900)
PLATELET # BLD AUTO: 498 K/UL (ref 150–450)
PMV BLD AUTO: 8.6 FL (ref 9.2–12.9)
POTASSIUM SERPL-SCNC: 4.5 MMOL/L (ref 3.5–5.1)
PROT SERPL-MCNC: 6 G/DL (ref 6–8.4)
RBC # BLD AUTO: 2.59 M/UL (ref 4.6–6.2)
SODIUM SERPL-SCNC: 129 MMOL/L (ref 136–145)
TROPONIN I SERPL DL<=0.01 NG/ML-MCNC: 47 NG/L (ref 0–35)
WBC # BLD AUTO: 11.56 K/UL (ref 3.9–12.7)

## 2025-01-24 PROCEDURE — 99900031 HC PATIENT EDUCATION (STAT)

## 2025-01-24 PROCEDURE — 36415 COLL VENOUS BLD VENIPUNCTURE: CPT | Performed by: EMERGENCY MEDICINE

## 2025-01-24 PROCEDURE — 80053 COMPREHEN METABOLIC PANEL: CPT | Performed by: EMERGENCY MEDICINE

## 2025-01-24 PROCEDURE — 93010 ELECTROCARDIOGRAM REPORT: CPT | Mod: ,,, | Performed by: INTERNAL MEDICINE

## 2025-01-24 PROCEDURE — 63600175 PHARM REV CODE 636 W HCPCS: Performed by: EMERGENCY MEDICINE

## 2025-01-24 PROCEDURE — 93005 ELECTROCARDIOGRAM TRACING: CPT

## 2025-01-24 PROCEDURE — 99285 EMERGENCY DEPT VISIT HI MDM: CPT | Mod: 25

## 2025-01-24 PROCEDURE — 94799 UNLISTED PULMONARY SVC/PX: CPT

## 2025-01-24 PROCEDURE — 85025 COMPLETE CBC W/AUTO DIFF WBC: CPT | Performed by: EMERGENCY MEDICINE

## 2025-01-24 PROCEDURE — 27000221 HC OXYGEN, UP TO 24 HOURS

## 2025-01-24 PROCEDURE — 96374 THER/PROPH/DIAG INJ IV PUSH: CPT

## 2025-01-24 PROCEDURE — 94761 N-INVAS EAR/PLS OXIMETRY MLT: CPT

## 2025-01-24 PROCEDURE — 12000002 HC ACUTE/MED SURGE SEMI-PRIVATE ROOM

## 2025-01-24 PROCEDURE — 84484 ASSAY OF TROPONIN QUANT: CPT | Performed by: EMERGENCY MEDICINE

## 2025-01-24 PROCEDURE — 83880 ASSAY OF NATRIURETIC PEPTIDE: CPT | Performed by: EMERGENCY MEDICINE

## 2025-01-24 PROCEDURE — 99900035 HC TECH TIME PER 15 MIN (STAT)

## 2025-01-24 RX ORDER — TALC
6 POWDER (GRAM) TOPICAL NIGHTLY PRN
Status: DISCONTINUED | OUTPATIENT
Start: 2025-01-24 | End: 2025-01-27 | Stop reason: HOSPADM

## 2025-01-24 RX ORDER — FUROSEMIDE 10 MG/ML
40 INJECTION INTRAMUSCULAR; INTRAVENOUS
Status: DISCONTINUED | OUTPATIENT
Start: 2025-01-24 | End: 2025-01-27 | Stop reason: HOSPADM

## 2025-01-24 RX ORDER — SODIUM CHLORIDE 0.9 % (FLUSH) 0.9 %
10 SYRINGE (ML) INJECTION
Status: DISCONTINUED | OUTPATIENT
Start: 2025-01-24 | End: 2025-01-27 | Stop reason: HOSPADM

## 2025-01-24 RX ORDER — FUROSEMIDE 10 MG/ML
40 INJECTION INTRAMUSCULAR; INTRAVENOUS
Status: COMPLETED | OUTPATIENT
Start: 2025-01-24 | End: 2025-01-24

## 2025-01-24 RX ADMIN — FUROSEMIDE 40 MG: 10 INJECTION, SOLUTION INTRAMUSCULAR; INTRAVENOUS at 04:01

## 2025-01-24 NOTE — ED PROVIDER NOTES
NAME:  Leonidas Bautista  CSN:     [id number]  MRN:    [id number]  ADMIT DATE: 1/24/2025        eMERGENCY dEPARTMENT eNCOUnter    CHIEF COMPLAINT    Chief Complaint   Patient presents with    Shortness of Breath     Pt referred to ED by Dr. Ybarra for admission for heart failure / anemia - complaints of worsening dyspnea - recent CABG.       HPI      Leonidas Bautista is a 74 y.o. male who presents to the ED for shortness of breath.  Patient is sent by Dr. Ybarra his cardiologist.  Patient had a recent quadruple bypass.  He was seen in the office this morning for follow-up and noted to be hypoxic and in fluid overload.  Patient was sent to the ED for admission.  Patient denies any chest pain.          ALLERGIES    Review of patient's allergies indicates:  No Known Allergies    PAST MEDICAL HISTORY  Past Medical History:   Diagnosis Date    Abnormal nuclear stress test 01/05/2025    Bilateral carotid artery stenosis 01/05/2025    CAD S/P percutaneous coronary angioplasty 2003    Stent mid LAD July 15, 2003    Carotid artery disease     Coronary artery disease involving native coronary artery of native heart without angina pectoris 01/05/2025    Diastolic dysfunction     Essential (primary) hypertension     Resistent    Graves disease     hyperthroidism    Hyperlipidemia     Hypertensive heart disease     Other hyperlipidemia 01/05/2025    Presence of drug coated stent in LAD coronary artery 01/05/2025    Primary hypertension 01/05/2025       SURGICAL HISTORY    Past Surgical History:   Procedure Laterality Date    ANGIOGRAPHY OF INTERNAL MAMMARY VESSEL Left 1/6/2025    Procedure: Angiogram Internal Mammary;  Surgeon: Erika Gomes MD;  Location: [facility] CATH LAB;  Service: Cardiology;  Laterality: Left;    ARTERIOGRAPHY OF SUBCLAVIAN ARTERY Left 1/6/2025    Procedure: ARTERIOGRAM, SUBCLAVIAN;  Surgeon: Erika Gomes MD;  Location: [facility] CATH LAB;  Service: Cardiology;  Laterality: Left;    CORONARY ARTERY BYPASS GRAFT (CABG)  N/A 1/7/2025    Procedure: CORONARY ARTERY BYPASS GRAFT (CABG);  Surgeon: Sidra Guardado MD;  Location: [facility] OR;  Service: Cardiothoracic;  Laterality: N/A;  CABG x    ECHOCARDIOGRAM,TRANSESOPHAGEAL N/A 1/7/2025    Procedure: ECHOCARDIOGRAM,TRANSESOPHAGEAL;  Surgeon: Sidra Guardado MD;  Location: [facility] OR;  Service: Cardiothoracic;  Laterality: N/A;    ENDOSCOPIC HARVEST OF VEIN Right 1/7/2025    Procedure: SURGICAL PROCUREMENT, VEIN, ENDOSCOPIC;  Surgeon: Sidra Guardado MD;  Location: [facility] OR;  Service: Cardiothoracic;  Laterality: Right;    INJECTION OF ANESTHETIC AGENT AROUND MULTIPLE INTERCOSTAL NERVES N/A 1/7/2025    Procedure: BLOCK, NERVE, INTERCOSTAL, 2 OR MORE;  Surgeon: Sidra Guardado MD;  Location: [facility] OR;  Service: Cardiothoracic;  Laterality: N/A;  Para sternal nerve lock    LEFT HEART CATHETERIZATION Left 1/6/2025    Procedure: Left heart cath;  Surgeon: Erika Gomes MD;  Location: [facility] CATH LAB;  Service: Cardiology;  Laterality: Left;    ptca with Stent mid LAD in 2003 N/A        SOCIAL HISTORY    Social History     Socioeconomic History    Marital status:    Tobacco Use    Smoking status: Former     Types: Cigarettes     Passive exposure: Past    Smokeless tobacco: Never   Substance and Sexual Activity    Alcohol use: Not Currently     Social Drivers of Health     Financial Resource Strain: Low Risk  (1/16/2025)    Overall Financial Resource Strain (CARDIA)     Difficulty of Paying Living Expenses: Not hard at all   Food Insecurity: No Food Insecurity (1/16/2025)    Hunger Vital Sign     Worried About Running Out of Food in the Last Year: Never true     Ran Out of Food in the Last Year: Never true   Transportation Needs: No Transportation Needs (1/16/2025)    TRANSPORTATION NEEDS     Transportation : No   Stress: No Stress Concern Present (1/16/2025)    [employer] - Occupational Stress Questionnaire     Feeling of Stress : Not at all  "  Housing Stability: Low Risk  (1/16/2025)    Housing Stability Vital Sign     Unable to Pay for Housing in the Last Year: No     Homeless in the Last Year: No       FAMILY HISTORY    No family history on file.    REVIEW OF SYSTEMS   ROS  All Systems otherwise negative except as noted in the History of Present Illness.        PHYSICAL EXAM    Reviewed Triage Note  VITAL SIGNS:   ED Triage Vitals   Encounter Vitals Group      BP 01/24/25 1414 103/64      Systolic BP Percentile --       Diastolic BP Percentile --       Pulse 01/24/25 1413 (!) 58      Resp 01/24/25 1413 20      Temp 01/24/25 1508 97.7 °F (36.5 °C)      Temp src --       SpO2 01/24/25 1413 (!) 87 %      Weight 01/24/25 1415 170 lb      Height 01/24/25 1415 5' 10"      Head Circumference --       Peak Flow --       Pain Score --       Pain Loc --       Pain Education --       Exclude from Growth Chart --        Patient Vitals for the past 24 hrs:   BP Temp Pulse Resp SpO2 Height Weight   01/24/25 1622 111/74 -- -- -- -- -- --   01/24/25 1621 -- -- 60 19 (!) 94 % -- --   01/24/25 1508 -- 97.7 °F (36.5 °C) -- -- -- -- --   01/24/25 1452 -- -- (!) 51 20 95 % -- --   01/24/25 1445 -- -- (!) 54 20 96 % -- --   01/24/25 1430 -- -- 60 (!) 28 (!) 92 % -- --   01/24/25 1428 132/77 -- (!) 55 -- (!) 73 % -- --   01/24/25 1415 -- -- -- -- -- 5' 10" (1.778 m) 77.1 kg (170 lb)   01/24/25 1414 103/64 -- -- -- -- -- --   01/24/25 1413 -- -- (!) 58 20 (!) 87 % -- --           Physical Exam    Constitutional:  chronically unwell appearing  HENT:  Normocephalic, atraumatic.  Eyes:  EOMI. Conjunctiva normal without discharge.   Neck: Normal range of motion.No stridor. No meningismus. jvd  Respiratory:  bibasilar rales, increased work of breathing  Cardiovascular:  Normal heart rate. Normal rhythm. 2+ pitting lower extremity edema.   GI:  Abdomen soft, non-distended, non-tender. Normal bowel sounds. No guarding, rigidity or rebound.    : No CVA tenderness. "   Musculoskeletal:  No gross deformity or limited range of motion of all major joints. No palpable bony deformity. No tenderness to palpation.  Integument:  Warm and dry. No rash.  Neurologic:  Normal motor function. Normal sensory function. No focal deficits noted. Alert and Interactive.  Psychiatric:  Affect normal. Mood normal.         LABS  Pertinent labs reviewed. (See chart for details)   Labs Reviewed   NT-PRO NATRIURETIC PEPTIDE - Abnormal       Result Value    NT-proBNP 7792 (*)    CBC W/ AUTO DIFFERENTIAL - Abnormal    WBC 11.56      RBC 2.59 (*)     Hemoglobin 8.5 (*)     Hematocrit 26.9 (*)      (*)     MCH 32.8 (*)     MCHC 31.6 (*)     RDW 14.3      Platelets 498 (*)     MPV 8.6 (*)     Immature Granulocytes 0.3      Gran # (ANC) 10.3 (*)     Immature Grans (Abs) 0.04      Lymph # 0.7 (*)     Mono # 0.5      Eos # 0.0      Baso # 0.01      nRBC 0      Gran % 89.2 (*)     Lymph % 6.3 (*)     Mono % 4.0      Eosinophil % 0.1      Basophil % 0.1      Differential Method Automated     COMPREHENSIVE METABOLIC PANEL - Abnormal    Sodium 129 (*)     Potassium 4.5      Chloride 94 (*)     CO2 27      Glucose 134 (*)     BUN 26 (*)     Creatinine 2.0 (*)     Calcium 8.7      Total Protein 6.0      Albumin 3.3 (*)     Total Bilirubin 0.3      Alkaline Phosphatase 96      AST 48 (*)     ALT 31      eGFR 34.4 (*)     Anion Gap 8     TROPONIN I HIGH SENSITIVITY - Abnormal    Troponin I High Sensitivity 47 (*)          RADIOLOGY    Imaging Results              X-Ray Chest AP Portable (Final result)  Result time 01/24/25 16:19:32      Final result by Torres Kern MD (01/24/25 16:19:32)                   Impression:      As above.      Electronically signed by: Torres Kern MD  Date:    01/24/2025  Time:    16:19               Narrative:    EXAMINATION:  XR CHEST AP PORTABLE    CLINICAL HISTORY:  Shortness of breath    COMPARISON:  01/14/2025    FINDINGS:  Cardiac silhouette appears enlarged, unchanged.  There  is sternotomy change.  Small bilateral pleural effusions are suspected with associated bibasilar atelectasis/infiltrates.  No acute osseous finding.                                      PROCEDURES    Procedures      EKG     Interpreted by ERP:     EKG Readings: (Independently Interpreted)   Rhythm: Atrial Flutter. Heart Rate: 53. Ectopy: No Ectopy. Conduction: Normal. ST Segments: Normal ST Segments. T Waves: Normal. Clinical Impression: Atrial Flutter       ED COURSE & MEDICAL DECISION MAKING    Pertinent & Imaging studies reviewed. (See chart for details and specific orders.)        Medications   furosemide injection 40 mg (40 mg Intravenous Given 1/24/25 9859)          Patient and acute CHF.  He will require admission for diuresis       DISPOSITION  Patient admitted in stable condition at No discharge date for patient encounter.        FINAL IMPRESSION    1. Acute on chronic congestive heart failure, unspecified heart failure type    2. Dyspnea              Critical care time spent with this patient (not including separately billable items) was  0 minutes.     DISCLAIMER: This note was prepared with Dragon NaturallySpeaking voice recognition transcription software. Garbled syntax, mangled pronouns, and other bizarre constructions may be attributed to that software system.      Kaleb Hurst MD  01/24/2025  5:02 PM           Kaleb Hurst MD  01/24/25 0002

## 2025-01-24 NOTE — Clinical Note
Diagnosis: Acute on chronic congestive heart failure, unspecified heart failure type [[id number]]   Future Attending Provider: DOYLE TRAVIS JR [[id number]]   Reason for IP Medical Treatment  (Clinical interventions that can only be accomplished in the IP setting? ) :: tx   Plans for Post-Acute care--if anticipated (pick the single best option):: A. No post acute care anticipated at this time   Special Needs:: No Special Needs [1]

## 2025-01-24 NOTE — PROGRESS NOTES
3rd attempt-C3 nurse attempted to contact Leonidas Bautista for a TCC post hospital discharge follow up call. No answer. Voicemail left. The patient does not have a scheduled HOSFU appointment, and the pt does not have an [facility] PCP.

## 2025-01-25 PROBLEM — Z99.11 ON MECHANICALLY ASSISTED VENTILATION: Status: RESOLVED | Noted: 2025-01-07 | Resolved: 2025-01-25

## 2025-01-25 PROBLEM — I50.9 ACUTE ON CHRONIC CONGESTIVE HEART FAILURE: Status: ACTIVE | Noted: 2025-01-25

## 2025-01-25 LAB
ALBUMIN SERPL BCP-MCNC: 3.1 G/DL (ref 3.5–5.2)
ALP SERPL-CCNC: 96 U/L (ref 55–135)
ALT SERPL W/O P-5'-P-CCNC: 29 U/L (ref 10–44)
ANION GAP SERPL CALC-SCNC: 8 MMOL/L (ref 8–16)
AST SERPL-CCNC: 37 U/L (ref 10–40)
BASOPHILS # BLD AUTO: 0.01 K/UL (ref 0–0.2)
BASOPHILS NFR BLD: 0.1 % (ref 0–1.9)
BILIRUB SERPL-MCNC: 0.3 MG/DL (ref 0.1–1)
BUN SERPL-MCNC: 25 MG/DL (ref 8–23)
CALCIUM SERPL-MCNC: 8.6 MG/DL (ref 8.7–10.5)
CHLORIDE SERPL-SCNC: 92 MMOL/L (ref 95–110)
CO2 SERPL-SCNC: 30 MMOL/L (ref 23–29)
CREAT SERPL-MCNC: 2 MG/DL (ref 0.5–1.4)
DIFFERENTIAL METHOD BLD: ABNORMAL
EOSINOPHIL # BLD AUTO: 0 K/UL (ref 0–0.5)
EOSINOPHIL NFR BLD: 0.5 % (ref 0–8)
ERYTHROCYTE [DISTWIDTH] IN BLOOD BY AUTOMATED COUNT: 14.2 % (ref 11.5–14.5)
EST. GFR  (NO RACE VARIABLE): 34.4 ML/MIN/1.73 M^2
GLUCOSE SERPL-MCNC: 94 MG/DL (ref 70–110)
HCT VFR BLD AUTO: 27.5 % (ref 40–54)
HGB BLD-MCNC: 8.8 G/DL (ref 14–18)
IMM GRANULOCYTES # BLD AUTO: 0.02 K/UL (ref 0–0.04)
IMM GRANULOCYTES NFR BLD AUTO: 0.2 % (ref 0–0.5)
LYMPHOCYTES # BLD AUTO: 0.9 K/UL (ref 1–4.8)
LYMPHOCYTES NFR BLD: 10.7 % (ref 18–48)
MAGNESIUM SERPL-MCNC: 2.1 MG/DL (ref 1.6–2.6)
MCH RBC QN AUTO: 33.1 PG (ref 27–31)
MCHC RBC AUTO-ENTMCNC: 32 G/DL (ref 32–36)
MCV RBC AUTO: 103 FL (ref 82–98)
MONOCYTES # BLD AUTO: 0.6 K/UL (ref 0.3–1)
MONOCYTES NFR BLD: 7.3 % (ref 4–15)
NEUTROPHILS # BLD AUTO: 6.9 K/UL (ref 1.8–7.7)
NEUTROPHILS NFR BLD: 81.2 % (ref 38–73)
NRBC BLD-RTO: 1 /100 WBC
OHS QRS DURATION: 104 MS
OHS QTC CALCULATION: 439 MS
PLATELET # BLD AUTO: 458 K/UL (ref 150–450)
PMV BLD AUTO: 8.4 FL (ref 9.2–12.9)
POTASSIUM SERPL-SCNC: 4.5 MMOL/L (ref 3.5–5.1)
PROT SERPL-MCNC: 5.8 G/DL (ref 6–8.4)
RBC # BLD AUTO: 2.66 M/UL (ref 4.6–6.2)
SODIUM SERPL-SCNC: 130 MMOL/L (ref 136–145)
T4 FREE SERPL-MCNC: 0.78 NG/DL (ref 0.71–1.51)
TROPONIN I SERPL DL<=0.01 NG/ML-MCNC: 40 NG/L (ref 0–35)
TSH SERPL DL<=0.005 MIU/L-ACNC: 3.28 UIU/ML (ref 0.4–4)
WBC # BLD AUTO: 8.45 K/UL (ref 3.9–12.7)

## 2025-01-25 PROCEDURE — 12000002 HC ACUTE/MED SURGE SEMI-PRIVATE ROOM

## 2025-01-25 PROCEDURE — 63600175 PHARM REV CODE 636 W HCPCS: Performed by: EMERGENCY MEDICINE

## 2025-01-25 PROCEDURE — 36415 COLL VENOUS BLD VENIPUNCTURE: CPT | Performed by: INTERNAL MEDICINE

## 2025-01-25 PROCEDURE — 84439 ASSAY OF FREE THYROXINE: CPT | Performed by: INTERNAL MEDICINE

## 2025-01-25 PROCEDURE — 80053 COMPREHEN METABOLIC PANEL: CPT | Performed by: INTERNAL MEDICINE

## 2025-01-25 PROCEDURE — 25000242 PHARM REV CODE 250 ALT 637 W/ HCPCS: Performed by: INTERNAL MEDICINE

## 2025-01-25 PROCEDURE — 27000221 HC OXYGEN, UP TO 24 HOURS

## 2025-01-25 PROCEDURE — 84484 ASSAY OF TROPONIN QUANT: CPT | Performed by: INTERNAL MEDICINE

## 2025-01-25 PROCEDURE — 94640 AIRWAY INHALATION TREATMENT: CPT | Mod: XB

## 2025-01-25 PROCEDURE — 83735 ASSAY OF MAGNESIUM: CPT | Performed by: INTERNAL MEDICINE

## 2025-01-25 PROCEDURE — 99900035 HC TECH TIME PER 15 MIN (STAT)

## 2025-01-25 PROCEDURE — 94761 N-INVAS EAR/PLS OXIMETRY MLT: CPT

## 2025-01-25 PROCEDURE — 85025 COMPLETE CBC W/AUTO DIFF WBC: CPT | Performed by: INTERNAL MEDICINE

## 2025-01-25 PROCEDURE — 25000003 PHARM REV CODE 250: Performed by: INTERNAL MEDICINE

## 2025-01-25 PROCEDURE — 99900031 HC PATIENT EDUCATION (STAT)

## 2025-01-25 PROCEDURE — 84443 ASSAY THYROID STIM HORMONE: CPT | Performed by: INTERNAL MEDICINE

## 2025-01-25 RX ORDER — ATORVASTATIN CALCIUM 40 MG/1
40 TABLET, FILM COATED ORAL NIGHTLY
Status: DISCONTINUED | OUTPATIENT
Start: 2025-01-25 | End: 2025-01-27 | Stop reason: HOSPADM

## 2025-01-25 RX ORDER — ARFORMOTEROL TARTRATE 15 UG/2ML
15 SOLUTION RESPIRATORY (INHALATION) 2 TIMES DAILY
Status: DISCONTINUED | OUTPATIENT
Start: 2025-01-25 | End: 2025-01-27 | Stop reason: HOSPADM

## 2025-01-25 RX ORDER — FLUTICASONE FUROATE AND VILANTEROL 100; 25 UG/1; UG/1
1 POWDER RESPIRATORY (INHALATION) DAILY
Status: DISCONTINUED | OUTPATIENT
Start: 2025-01-25 | End: 2025-01-25

## 2025-01-25 RX ORDER — BUDESONIDE 0.5 MG/2ML
0.5 INHALANT ORAL 2 TIMES DAILY
Status: DISCONTINUED | OUTPATIENT
Start: 2025-01-25 | End: 2025-01-27 | Stop reason: HOSPADM

## 2025-01-25 RX ORDER — CLOPIDOGREL BISULFATE 75 MG/1
75 TABLET ORAL DAILY
Status: DISCONTINUED | OUTPATIENT
Start: 2025-01-25 | End: 2025-01-27 | Stop reason: HOSPADM

## 2025-01-25 RX ORDER — AMIODARONE HYDROCHLORIDE 200 MG/1
200 TABLET ORAL DAILY
Status: DISCONTINUED | OUTPATIENT
Start: 2025-01-25 | End: 2025-01-27 | Stop reason: HOSPADM

## 2025-01-25 RX ORDER — METHIMAZOLE 10 MG/1
10 TABLET ORAL DAILY
Status: DISCONTINUED | OUTPATIENT
Start: 2025-01-25 | End: 2025-01-27 | Stop reason: HOSPADM

## 2025-01-25 RX ADMIN — FUROSEMIDE 40 MG: 10 INJECTION, SOLUTION INTRAMUSCULAR; INTRAVENOUS at 05:01

## 2025-01-25 RX ADMIN — ATORVASTATIN CALCIUM 40 MG: 40 TABLET, FILM COATED ORAL at 09:01

## 2025-01-25 RX ADMIN — BUDESONIDE 0.5 MG: 0.5 INHALANT RESPIRATORY (INHALATION) at 09:01

## 2025-01-25 RX ADMIN — ARFORMOTEROL TARTRATE 15 MCG: 15 SOLUTION RESPIRATORY (INHALATION) at 09:01

## 2025-01-25 RX ADMIN — FUROSEMIDE 40 MG: 10 INJECTION, SOLUTION INTRAMUSCULAR; INTRAVENOUS at 06:01

## 2025-01-25 RX ADMIN — APIXABAN 5 MG: 5 TABLET, FILM COATED ORAL at 09:01

## 2025-01-25 RX ADMIN — CLOPIDOGREL BISULFATE 75 MG: 75 TABLET ORAL at 09:01

## 2025-01-25 RX ADMIN — AMIODARONE HYDROCHLORIDE 200 MG: 200 TABLET ORAL at 09:01

## 2025-01-25 RX ADMIN — METHIMAZOLE 10 MG: 10 TABLET ORAL at 10:01

## 2025-01-25 RX ADMIN — BUDESONIDE 0.5 MG: 0.5 INHALANT RESPIRATORY (INHALATION) at 08:01

## 2025-01-25 RX ADMIN — ARFORMOTEROL TARTRATE 15 MCG: 15 SOLUTION RESPIRATORY (INHALATION) at 08:01

## 2025-01-25 NOTE — ASSESSMENT & PLAN NOTE
"Patient has Diastolic (HFpEF) heart failure that is Acute on chronic. Most recent BNP and echo results are listed below.  No results for input(s): "BNP" in the last 72 hours.  Latest ECHO  Results for orders placed during the hospital encounter of 01/14/25    Echo    Interpretation Summary    Technically difficult study.    Left Ventricle: The left ventricle is normal in size. Increased wall thickness. There is normal systolic function with a visually estimated ejection fraction of 55 - 60%.    Right Ventricle: Right ventricle was not well visualized due to poor acoustic window.    Aortic Valve: There is no stenosis. There is no significant regurgitation.    Mitral Valve: There is mild regurgitation.    Tricuspid Valve: There is trace regurgitation.    Pericardium: There is no pericardial effusion. Left pleural effusion.    Current Heart Failure Medications  furosemide injection 40 mg, Every 12 hours (non-standard times), Intravenous    Plan  - Monitor strict I&Os and daily weights.    - Place on telemetry  - Low sodium diet  - Place on fluid restriction of 1.5 L.   - Cardiology has been consulted  - The patient's volume status is improving but not at their baseline as indicated by edema and shortness of breath      "

## 2025-01-25 NOTE — ED NOTES
Called to patient's room due to leaky purewick. Purewick removed and patient provided with urinal. Linens and gown changed. Pt repositioned in bed. Pt requesting meal.

## 2025-01-25 NOTE — ASSESSMENT & PLAN NOTE
Patient has persistent (7 days or more) atrial fibrillation. Patient is currently in sinus rhythm. [id number] Score: 2. The patients heart rate in the last 24 hours is as follows:  Pulse  Min: 51  Max: 64     Antiarrhythmics  amiodarone tablet 200 mg, Daily, Oral    Anticoagulants  apixaban tablet 5 mg, 2 times daily, Oral    Plan  - Replete lytes with a goal of K>4, Mg >2  - Patient is anticoagulated, see medications listed above.  - Patient's afib is currently controlled

## 2025-01-25 NOTE — HPI
Patient 74-year-old male past medical history of CAD, recent quadruple bypass earlier this month, AFib hypertension, diastolic dysfunction, carotid artery disease, Graves disease, hyperlipidemia and tobacco abuse was sent to ED by cardiologist for shortness of breath and hypoxemia, secondary to volume overload, patient was evaluated in the ED noted to have hyponatremia, blood pressure stable, afebrile, bilateral lower extremity edema, given IV Lasix admitted for diuresis

## 2025-01-25 NOTE — ASSESSMENT & PLAN NOTE
Patient with known CAD s/p  recent quadruple bypass 1/6/25, stent placement, and CABG, which is controlled Will continue ASA, Plavix, and Statin and monitor for S/Sx of angina/ACS. Continue to monitor on telemetry.

## 2025-01-25 NOTE — SUBJECTIVE & OBJECTIVE
Past Medical History:   Diagnosis Date    Abnormal nuclear stress test 01/05/2025    Bilateral carotid artery stenosis 01/05/2025    CAD S/P percutaneous coronary angioplasty 2003    Stent mid LAD July 15, 2003    Carotid artery disease     Coronary artery disease involving native coronary artery of native heart without angina pectoris 01/05/2025    Diastolic dysfunction     Essential (primary) hypertension     Resistent    Graves disease     hyperthroidism    Hyperlipidemia     Hypertensive heart disease     Other hyperlipidemia 01/05/2025    Presence of drug coated stent in LAD coronary artery 01/05/2025    Primary hypertension 01/05/2025       Past Surgical History:   Procedure Laterality Date    ANGIOGRAPHY OF INTERNAL MAMMARY VESSEL Left 1/6/2025    Procedure: Angiogram Internal Mammary;  Surgeon: Erika Gomes MD;  Location: [facility] CATH LAB;  Service: Cardiology;  Laterality: Left;    ARTERIOGRAPHY OF SUBCLAVIAN ARTERY Left 1/6/2025    Procedure: ARTERIOGRAM, SUBCLAVIAN;  Surgeon: Erika Gomes MD;  Location: [facility] CATH LAB;  Service: Cardiology;  Laterality: Left;    CORONARY ARTERY BYPASS GRAFT (CABG) N/A 1/7/2025    Procedure: CORONARY ARTERY BYPASS GRAFT (CABG);  Surgeon: Sidra Guardado MD;  Location: [facility] OR;  Service: Cardiothoracic;  Laterality: N/A;  CABG x    ECHOCARDIOGRAM,TRANSESOPHAGEAL N/A 1/7/2025    Procedure: ECHOCARDIOGRAM,TRANSESOPHAGEAL;  Surgeon: Sidra Guardado MD;  Location: [facility] OR;  Service: Cardiothoracic;  Laterality: N/A;    ENDOSCOPIC HARVEST OF VEIN Right 1/7/2025    Procedure: SURGICAL PROCUREMENT, VEIN, ENDOSCOPIC;  Surgeon: Sidra Guardado MD;  Location: [facility] OR;  Service: Cardiothoracic;  Laterality: Right;    INJECTION OF ANESTHETIC AGENT AROUND MULTIPLE INTERCOSTAL NERVES N/A 1/7/2025    Procedure: BLOCK, NERVE, INTERCOSTAL, 2 OR MORE;  Surgeon: Sidra Guardado MD;  Location: [facility] OR;  Service: Cardiothoracic;  Laterality: N/A;  Para sternal nerve lock    LEFT  HEART CATHETERIZATION Left 1/6/2025    Procedure: Left heart cath;  Surgeon: Erika Gomes MD;  Location: [facility] CATH LAB;  Service: Cardiology;  Laterality: Left;    ptca with Stent mid LAD in 2003 N/A        Review of patient's allergies indicates:  No Known Allergies    No current facility-administered medications on file prior to encounter.     Current Outpatient Medications on File Prior to Encounter   Medication Sig    amiodarone (PACERONE) 200 MG Tab Take 1 tablet (200 mg total) by mouth once daily.    apixaban (ELIQUIS) 5 mg Tab Take 1 tablet (5 mg total) by mouth 2 (two) times daily.    atorvastatin (LIPITOR) 40 MG tablet Take 1 tablet (40 mg total) by mouth every evening.    clopidogreL (PLAVIX) 75 mg tablet Take 1 tablet (75 mg total) by mouth once daily.    fluticasone furoate-vilanteroL (BREO) 100-25 mcg/dose diskus inhaler Inhale 1 puff into the lungs once daily. Controller    furosemide (LASIX) 20 MG tablet Take 1 tablet (20 mg total) by mouth once daily.    isosorbide-hydrALAZINE 20-37.5 mg (BIDIL) 20-37.5 mg Tab Take 1 tablet by mouth 3 (three) times daily.    methIMAzole (TAPAZOLE) 5 MG Tab Take 10 mg by mouth once daily.    metoprolol succinate (TOPROL-XL) 50 MG 24 hr tablet Take 50 mg by mouth once daily.    NIFEdipine (PROCARDIA-XL) 30 MG (OSM) 24 hr tablet Take 30 mg by mouth every evening.    nitroGLYCERIN 0.4 MG/DOSE TL SPRY (NITROLINGUAL) 400 mcg/spray spray Place 1 spray under the tongue every 5 (five) minutes as needed for Chest pain.    omega-3 fatty acids/fish oil (FISH OIL-OMEGA-3 FATTY ACIDS) 300-1,000 mg capsule Take 1 capsule by mouth once daily.    oxyCODONE (ROXICODONE) 5 MG immediate release tablet Take 1 tablet (5 mg total) by mouth every 4 (four) hours as needed for Pain.    tiotropium bromide (SPIRIVA RESPIMAT) 2.5 mcg/actuation inhaler Inhale 2 puffs into the lungs every evening. Controller     Family History    None       Tobacco Use    Smoking status: Former     Types:  Cigarettes     Passive exposure: Past    Smokeless tobacco: Never   Substance and Sexual Activity    Alcohol use: Not Currently    Drug use: Not on file    Sexual activity: Not on file     Review of Systems   Constitutional:  Negative for chills, fatigue and fever.   HENT:  Negative for congestion.    Eyes:  Negative for visual disturbance.   Respiratory:  Positive for shortness of breath. Negative for wheezing.    Cardiovascular:  Positive for leg swelling. Negative for chest pain and palpitations.   Gastrointestinal:  Negative for abdominal pain, constipation, diarrhea, nausea and vomiting.   Endocrine: Negative for polyuria.   Genitourinary:  Negative for difficulty urinating and dysuria.   Musculoskeletal:  Positive for arthralgias. Negative for back pain and gait problem.   Skin:  Negative for wound.   Neurological:  Negative for weakness.   Psychiatric/Behavioral:  The patient is not nervous/anxious.      Objective:     Vital Signs (Most Recent):  Temp: 97.7 °F (36.5 °C) (01/24/25 1508)  Pulse: 64 (01/25/25 0145)  Resp: (!) 35 (01/25/25 0145)  BP: (!) 141/71 (01/25/25 0145)  SpO2: 96 % (01/25/25 0145) Vital Signs (24h Range):  Temp:  [97.7 °F (36.5 °C)] 97.7 °F (36.5 °C)  Pulse:  [51-64] 64  Resp:  [18-42] 35  SpO2:  [73 %-98 %] 96 %  BP: (103-169)/(61-92) 141/71     Weight: 77.1 kg (170 lb)  Body mass index is 24.39 kg/m².     Physical Exam  Vitals and nursing note reviewed.   Constitutional:       General: He is not in acute distress.     Appearance: He is well-developed. He is not diaphoretic.   HENT:      Head: Normocephalic and atraumatic.      Nose: No congestion or rhinorrhea.   Eyes:      General: No scleral icterus.        Right eye: No discharge.         Left eye: No discharge.      Extraocular Movements: Extraocular movements intact.   Neck:      Thyroid: No thyromegaly.      Vascular: No JVD.   Cardiovascular:      Rate and Rhythm: Normal rate.   Pulmonary:      Effort: No respiratory distress.       Breath sounds: Rales present. No wheezing or rhonchi.   Chest:      Comments: Sternotomy incision noted  Abdominal:      General: There is no distension.      Tenderness: There is no abdominal tenderness. There is no guarding or rebound.   Musculoskeletal:      Right lower leg: Edema present.      Left lower leg: Edema present.   Skin:     General: Skin is warm and dry.      Capillary Refill: Capillary refill takes less than 2 seconds.   Neurological:      Mental Status: He is alert and oriented to person, place, and time. Mental status is at baseline.   Psychiatric:         Behavior: Behavior normal.         Thought Content: Thought content normal.                Significant Labs: All pertinent labs within the past 24 hours have been reviewed.  Recent Lab Results         01/24/25  1514   01/24/25  1509        Albumin 3.3         ALP 96         ALT 31         Anion Gap 8         AST 48         Baso # 0.01         Basophil % 0.1         BILIRUBIN TOTAL 0.3  Comment: For infants and newborns, interpretation of results should be based  on gestational age, weight and in agreement with clinical  observations.    Premature Infant recommended reference ranges:  Up to 24 hours.............<8.0 mg/dL  Up to 48 hours............<12.0 mg/dL  3-5 days..................<15.0 mg/dL  6-29 days.................<15.0 mg/dL           BUN 26         Calcium 8.7         Chloride 94         CO2 27         Creatinine 2.0         Differential Method Automated         eGFR 34.4         Eos # 0.0         Eos % 0.1         Glucose 134         Gran # (ANC) 10.3         Gran % 89.2         Hematocrit 26.9         Hemoglobin 8.5         Immature Grans (Abs) 0.04  Comment: Mild elevation in immature granulocytes is non specific and   can be seen in a variety of conditions including stress response,   acute inflammation, trauma and pregnancy. Correlation with other   laboratory and clinical findings is essential.           Immature Granulocytes  0.3         Lymph # 0.7         Lymph % 6.3         MCH 32.8         MCHC 31.6                  Mono # 0.5         Mono % 4.0         MPV 8.6         nRBC 0         NT-proBNP 7792         QRS Duration   104       OHS QTC Calculation   439       Platelet Count 498         Potassium 4.5         PROTEIN TOTAL 6.0         RBC 2.59         RDW 14.3         Sodium 129         Troponin I High Sensitivity 47         WBC 11.56                 Significant Imaging: I have reviewed all pertinent imaging results/findings within the past 24 hours.

## 2025-01-25 NOTE — ASSESSMENT & PLAN NOTE
Patient's blood pressure range in the last 24 hours was: BP  Min: 103/64  Max: 169/88.The patient's inpatient anti-hypertensive regimen is listed below:  Current Antihypertensives  furosemide injection 40 mg, Every 12 hours (non-standard times), Intravenous    Plan  - BP is controlled, no changes needed to their regimen  - adjust p.r.n.

## 2025-01-25 NOTE — ASSESSMENT & PLAN NOTE
Creatine stable for now. BMP reviewed- noted Estimated Creatinine Clearance: 33.5 mL/min (A) (based on SCr of 2 mg/dL (H)). according to latest data. Based on current GFR, CKD stage is stage 3 - GFR 30-59.  Monitor UOP and serial BMP and adjust therapy as needed. Renally dose meds. Avoid nephrotoxic medications and procedures.

## 2025-01-26 LAB
ALBUMIN SERPL BCP-MCNC: 3 G/DL (ref 3.5–5.2)
ALP SERPL-CCNC: 93 U/L (ref 55–135)
ALT SERPL W/O P-5'-P-CCNC: 27 U/L (ref 10–44)
ANION GAP SERPL CALC-SCNC: 8 MMOL/L (ref 8–16)
AST SERPL-CCNC: 35 U/L (ref 10–40)
BASOPHILS # BLD AUTO: 0.02 K/UL (ref 0–0.2)
BASOPHILS NFR BLD: 0.2 % (ref 0–1.9)
BILIRUB SERPL-MCNC: 0.3 MG/DL (ref 0.1–1)
BNP SERPL-MCNC: 493 PG/ML (ref 0–99)
BUN SERPL-MCNC: 24 MG/DL (ref 8–23)
CALCIUM SERPL-MCNC: 8.4 MG/DL (ref 8.7–10.5)
CHLORIDE SERPL-SCNC: 92 MMOL/L (ref 95–110)
CO2 SERPL-SCNC: 31 MMOL/L (ref 23–29)
CREAT SERPL-MCNC: 2.1 MG/DL (ref 0.5–1.4)
DIFFERENTIAL METHOD BLD: ABNORMAL
EOSINOPHIL # BLD AUTO: 0 K/UL (ref 0–0.5)
EOSINOPHIL NFR BLD: 0.4 % (ref 0–8)
ERYTHROCYTE [DISTWIDTH] IN BLOOD BY AUTOMATED COUNT: 14 % (ref 11.5–14.5)
EST. GFR  (NO RACE VARIABLE): 32.4 ML/MIN/1.73 M^2
GLUCOSE SERPL-MCNC: 100 MG/DL (ref 70–110)
HCT VFR BLD AUTO: 28 % (ref 40–54)
HGB BLD-MCNC: 9 G/DL (ref 14–18)
IMM GRANULOCYTES # BLD AUTO: 0.02 K/UL (ref 0–0.04)
IMM GRANULOCYTES NFR BLD AUTO: 0.2 % (ref 0–0.5)
LYMPHOCYTES # BLD AUTO: 1.4 K/UL (ref 1–4.8)
LYMPHOCYTES NFR BLD: 15.4 % (ref 18–48)
MAGNESIUM SERPL-MCNC: 2 MG/DL (ref 1.6–2.6)
MCH RBC QN AUTO: 32.6 PG (ref 27–31)
MCHC RBC AUTO-ENTMCNC: 32.1 G/DL (ref 32–36)
MCV RBC AUTO: 101 FL (ref 82–98)
MONOCYTES # BLD AUTO: 0.9 K/UL (ref 0.3–1)
MONOCYTES NFR BLD: 10 % (ref 4–15)
NEUTROPHILS # BLD AUTO: 6.6 K/UL (ref 1.8–7.7)
NEUTROPHILS NFR BLD: 73.8 % (ref 38–73)
NRBC BLD-RTO: 0 /100 WBC
PLATELET # BLD AUTO: 434 K/UL (ref 150–450)
PMV BLD AUTO: 8.4 FL (ref 9.2–12.9)
POTASSIUM SERPL-SCNC: 4 MMOL/L (ref 3.5–5.1)
PROT SERPL-MCNC: 5.5 G/DL (ref 6–8.4)
RBC # BLD AUTO: 2.76 M/UL (ref 4.6–6.2)
SODIUM SERPL-SCNC: 131 MMOL/L (ref 136–145)
WBC # BLD AUTO: 8.94 K/UL (ref 3.9–12.7)

## 2025-01-26 PROCEDURE — 63600175 PHARM REV CODE 636 W HCPCS: Performed by: EMERGENCY MEDICINE

## 2025-01-26 PROCEDURE — 94640 AIRWAY INHALATION TREATMENT: CPT | Mod: XB

## 2025-01-26 PROCEDURE — 25000242 PHARM REV CODE 250 ALT 637 W/ HCPCS: Performed by: INTERNAL MEDICINE

## 2025-01-26 PROCEDURE — 99900035 HC TECH TIME PER 15 MIN (STAT)

## 2025-01-26 PROCEDURE — 27000221 HC OXYGEN, UP TO 24 HOURS

## 2025-01-26 PROCEDURE — 36415 COLL VENOUS BLD VENIPUNCTURE: CPT | Performed by: INTERNAL MEDICINE

## 2025-01-26 PROCEDURE — 25000003 PHARM REV CODE 250: Performed by: INTERNAL MEDICINE

## 2025-01-26 PROCEDURE — 94761 N-INVAS EAR/PLS OXIMETRY MLT: CPT

## 2025-01-26 PROCEDURE — 99900031 HC PATIENT EDUCATION (STAT)

## 2025-01-26 PROCEDURE — 85025 COMPLETE CBC W/AUTO DIFF WBC: CPT | Performed by: INTERNAL MEDICINE

## 2025-01-26 PROCEDURE — 83880 ASSAY OF NATRIURETIC PEPTIDE: CPT | Performed by: INTERNAL MEDICINE

## 2025-01-26 PROCEDURE — 80053 COMPREHEN METABOLIC PANEL: CPT | Performed by: INTERNAL MEDICINE

## 2025-01-26 PROCEDURE — 12000002 HC ACUTE/MED SURGE SEMI-PRIVATE ROOM

## 2025-01-26 PROCEDURE — 94799 UNLISTED PULMONARY SVC/PX: CPT

## 2025-01-26 PROCEDURE — 83735 ASSAY OF MAGNESIUM: CPT | Performed by: INTERNAL MEDICINE

## 2025-01-26 RX ADMIN — BUDESONIDE 0.5 MG: 0.5 INHALANT RESPIRATORY (INHALATION) at 07:01

## 2025-01-26 RX ADMIN — ARFORMOTEROL TARTRATE 15 MCG: 15 SOLUTION RESPIRATORY (INHALATION) at 08:01

## 2025-01-26 RX ADMIN — APIXABAN 5 MG: 5 TABLET, FILM COATED ORAL at 09:01

## 2025-01-26 RX ADMIN — CLOPIDOGREL BISULFATE 75 MG: 75 TABLET ORAL at 09:01

## 2025-01-26 RX ADMIN — METHIMAZOLE 10 MG: 10 TABLET ORAL at 09:01

## 2025-01-26 RX ADMIN — ARFORMOTEROL TARTRATE 15 MCG: 15 SOLUTION RESPIRATORY (INHALATION) at 07:01

## 2025-01-26 RX ADMIN — APIXABAN 5 MG: 5 TABLET, FILM COATED ORAL at 08:01

## 2025-01-26 RX ADMIN — FUROSEMIDE 40 MG: 10 INJECTION, SOLUTION INTRAMUSCULAR; INTRAVENOUS at 05:01

## 2025-01-26 RX ADMIN — BUDESONIDE 0.5 MG: 0.5 INHALANT RESPIRATORY (INHALATION) at 08:01

## 2025-01-26 RX ADMIN — AMIODARONE HYDROCHLORIDE 200 MG: 200 TABLET ORAL at 09:01

## 2025-01-26 RX ADMIN — ATORVASTATIN CALCIUM 40 MG: 40 TABLET, FILM COATED ORAL at 08:01

## 2025-01-26 NOTE — PROGRESS NOTES
[facility] [place] Emergency Department  [facility] Medicine  Progress Note    Patient Name: Leonidas Bautista  MRN: [id number]  Patient Class: IP- Inpatient   Admission Date: 1/24/2025  Length of Stay: 0 days  Attending Physician: Kaleb Collier Jr., MD  Primary Care Provider: Ann, Primary Doctor        Subjective     Principal Problem:Acute on chronic congestive heart failure        HPI:  Patient 74-year-old male past medical history of CAD, recent quadruple bypass earlier this month, AFib hypertension, diastolic dysfunction, carotid artery disease, Graves disease, hyperlipidemia and tobacco abuse was sent to ED by cardiologist for shortness of breath and hypoxemia, secondary to volume overload, patient was evaluated in the ED noted to have hyponatremia, blood pressure stable, afebrile, bilateral lower extremity edema, given IV Lasix admitted for diuresis    Overview/Hospital Course:  1/26 AA, weekend crosscover, no acute events reported overnight, diuresis in progress, blood pressure stable, patient reported having discrepancy with medication list, patient was sent by cardiologist for admission, will consult Cardiology, need clarification on medication, patient reported multiple episodes palpitation episode after surgery which resulted increased shortness of breath, no abnormal telemetry reported    Interval History:  Patient seen and examined.    Review of Systems   Constitutional:  Negative for chills, fatigue and fever.   HENT:  Negative for congestion.    Eyes:  Negative for visual disturbance.   Respiratory:  Positive for shortness of breath. Negative for wheezing.    Cardiovascular:  Positive for leg swelling. Negative for chest pain and palpitations.   Gastrointestinal:  Negative for abdominal pain, constipation, diarrhea, nausea and vomiting.   Endocrine: Negative for polyuria.   Genitourinary:  Negative for difficulty urinating and dysuria.   Musculoskeletal:  Positive for arthralgias. Negative for back pain and gait  problem.   Skin:  Negative for wound.   Neurological:  Negative for weakness.   Psychiatric/Behavioral:  The patient is not nervous/anxious.      Objective:     Vital Signs (Most Recent):  Temp: 98.2 °F (36.8 °C) (01/26/25 1057)  Pulse: 70 (01/26/25 1057)  Resp: 18 (01/26/25 1057)  BP: 126/64 (01/26/25 1057)  SpO2: 96 % (01/26/25 1057) Vital Signs (24h Range):  Temp:  [96.9 °F (36.1 °C)-98.8 °F (37.1 °C)] 98.2 °F (36.8 °C)  Pulse:  [61-70] 70  Resp:  [18-33] 18  SpO2:  [93 %-100 %] 96 %  BP: (123-164)/(58-80) 126/64     Weight: 77.1 kg (170 lb)  Body mass index is 24.39 kg/m².    Intake/Output Summary (Last 24 hours) at 1/26/2025 1249  Last data filed at 1/26/2025 0405  Gross per 24 hour   Intake 480 ml   Output 1500 ml   Net -1020 ml         Physical Exam  Vitals and nursing note reviewed.   Constitutional:       General: He is not in acute distress.     Appearance: He is well-developed. He is not diaphoretic.   HENT:      Head: Normocephalic and atraumatic.      Nose: No congestion or rhinorrhea.   Eyes:      General: No scleral icterus.        Right eye: No discharge.         Left eye: No discharge.      Extraocular Movements: Extraocular movements intact.   Neck:      Thyroid: No thyromegaly.      Vascular: No JVD.   Cardiovascular:      Rate and Rhythm: Normal rate.   Pulmonary:      Effort: No respiratory distress.      Breath sounds: Rales present. No wheezing or rhonchi.   Chest:      Comments: Sternotomy incision noted  Abdominal:      General: There is no distension.      Tenderness: There is no abdominal tenderness. There is no guarding or rebound.   Musculoskeletal:      Right lower leg: Edema present.      Left lower leg: Edema present.   Skin:     General: Skin is warm and dry.      Capillary Refill: Capillary refill takes less than 2 seconds.   Neurological:      Mental Status: He is alert and oriented to person, place, and time. Mental status is at baseline.   Psychiatric:         Behavior: Behavior  "normal.         Thought Content: Thought content normal.             Significant Labs: All pertinent labs within the past 24 hours have been reviewed.  Recent Lab Results         01/26/25  0444        Albumin 3.0       ALP 93       ALT 27       Anion Gap 8       AST 35       Baso # 0.02       Basophil % 0.2       BILIRUBIN TOTAL 0.3  Comment: For infants and newborns, interpretation of results should be based  on gestational age, weight and in agreement with clinical  observations.    Premature Infant recommended reference ranges:  Up to 24 hours.............<8.0 mg/dL  Up to 48 hours............<12.0 mg/dL  3-5 days..................<15.0 mg/dL  6-29 days.................<15.0 mg/dL         BUN 24       Calcium 8.4       Chloride 92       CO2 31       Creatinine 2.1       Differential Method Automated       eGFR 32.4       Eos # 0.0       Eos % 0.4       Glucose 100       Gran # (ANC) 6.6       Gran % 73.8       Hematocrit 28.0       Hemoglobin 9.0       Immature Grans (Abs) 0.02  Comment: Mild elevation in immature granulocytes is non specific and   can be seen in a variety of conditions including stress response,   acute inflammation, trauma and pregnancy. Correlation with other   laboratory and clinical findings is essential.         Immature Granulocytes 0.2       Lymph # 1.4       Lymph % 15.4       Magnesium  2.0       MCH 32.6       MCHC 32.1              Mono # 0.9       Mono % 10.0       MPV 8.4       nRBC 0       Platelet Count 434       Potassium 4.0       PROTEIN TOTAL 5.5       RBC 2.76       RDW 14.0       Sodium 131       WBC 8.94               Significant Imaging: I have reviewed all pertinent imaging results/findings within the past 24 hours.    Assessment and Plan     * Acute on chronic congestive heart failure  Patient has Diastolic (HFpEF) heart failure that is Acute on chronic. Most recent BNP and echo results are listed below.  No results for input(s): "BNP" in the last 72 " hours.  Latest ECHO  Results for orders placed during the hospital encounter of 01/14/25    Echo    Interpretation Summary    Technically difficult study.    Left Ventricle: The left ventricle is normal in size. Increased wall thickness. There is normal systolic function with a visually estimated ejection fraction of 55 - 60%.    Right Ventricle: Right ventricle was not well visualized due to poor acoustic window.    Aortic Valve: There is no stenosis. There is no significant regurgitation.    Mitral Valve: There is mild regurgitation.    Tricuspid Valve: There is trace regurgitation.    Pericardium: There is no pericardial effusion. Left pleural effusion.    Current Heart Failure Medications  furosemide injection 40 mg, Every 12 hours (non-standard times), Intravenous    Plan  - Monitor strict I&Os and daily weights.    - Place on telemetry  - Low sodium diet  - Place on fluid restriction of 1.5 L.   - Cardiology has been consulted  - The patient's volume status is improving but not at their baseline as indicated by edema and shortness of breath  1/26 diuresis in progress, patient negative 2.8 L since admission, patient had reported having episodes of palpitation with associated dyspnea, reported one episode of LOC, no abnormal telemetry reported, with patient being two weeks post CABG consult cardiology, may need repeat echo          Paroxysmal atrial fibrillation  Patient has persistent (7 days or more) atrial fibrillation. Patient is currently in sinus rhythm. [id number] Score: 2. The patients heart rate in the last 24 hours is as follows:  Pulse  Min: 51  Max: 64     Antiarrhythmics  amiodarone tablet 200 mg, Daily, Oral    Anticoagulants  apixaban tablet 5 mg, 2 times daily, Oral    Plan  - Replete lytes with a goal of K>4, Mg >2  - Patient is anticoagulated, see medications listed above.  - Patient's afib is currently controlled        Graves' disease  Continue home dose methimazole      Stage 3b chronic  kidney disease  Creatine stable for now. BMP reviewed- noted Estimated Creatinine Clearance: 33.5 mL/min (A) (based on SCr of 2 mg/dL (H)). according to latest data. Based on current GFR, CKD stage is stage 3 - GFR 30-59.  Monitor UOP and serial BMP and adjust therapy as needed. Renally dose meds. Avoid nephrotoxic medications and procedures.    S/P CABG x 4        CAD, multiple vessel  Patient with known CAD s/p  recent quadruple bypass 1/6/25, stent placement, and CABG, which is controlled Will continue ASA, Plavix, and Statin and monitor for S/Sx of angina/ACS. Continue to monitor on telemetry.     Other hyperlipidemia  Lipitor    Primary hypertension  Patient's blood pressure range in the last 24 hours was: BP  Min: 103/64  Max: 169/88.The patient's inpatient anti-hypertensive regimen is listed below:  Current Antihypertensives  furosemide injection 40 mg, Every 12 hours (non-standard times), Intravenous    Plan  - BP is controlled, no changes needed to their regimen  - adjust p.r.n.      VTE Risk Mitigation (From admission, onward)           Ordered     apixaban tablet 5 mg  2 times daily         01/25/25 0912     IP VTE HIGH RISK PATIENT  Once         01/24/25 1731     Place sequential compression device  Until discontinued         01/24/25 1731                    Discharge Planning   LETICIA:      Code Status: Full Code   Medical Readiness for Discharge Date:                            Rohan Mead MD  Department of Hospital Medicine   [place] - Emergency Department

## 2025-01-26 NOTE — ASSESSMENT & PLAN NOTE
"Patient has Diastolic (HFpEF) heart failure that is Acute on chronic. Most recent BNP and echo results are listed below.  No results for input(s): "BNP" in the last 72 hours.  Latest ECHO  Results for orders placed during the hospital encounter of 01/14/25    Echo    Interpretation Summary    Technically difficult study.    Left Ventricle: The left ventricle is normal in size. Increased wall thickness. There is normal systolic function with a visually estimated ejection fraction of 55 - 60%.    Right Ventricle: Right ventricle was not well visualized due to poor acoustic window.    Aortic Valve: There is no stenosis. There is no significant regurgitation.    Mitral Valve: There is mild regurgitation.    Tricuspid Valve: There is trace regurgitation.    Pericardium: There is no pericardial effusion. Left pleural effusion.    Current Heart Failure Medications  furosemide injection 40 mg, Every 12 hours (non-standard times), Intravenous    Plan  - Monitor strict I&Os and daily weights.    - Place on telemetry  - Low sodium diet  - Place on fluid restriction of 1.5 L.   - Cardiology has been consulted  - The patient's volume status is improving but not at their baseline as indicated by edema and shortness of breath  1/26 diuresis in progress, patient negative 2.8 L since admission, patient had reported having episodes of palpitation with associated dyspnea, reported one episode of LOC, no abnormal telemetry reported, with patient being two weeks post CABG consult cardiology, may need repeat echo        "

## 2025-01-26 NOTE — HOSPITAL COURSE
1/26 AA, weekend crosscover, no acute events reported overnight, diuresis in progress, blood pressure stable, patient reported having discrepancy with medication list, patient was sent by cardiologist for admission, will consult Cardiology, need clarification on medication, patient reported multiple episodes palpitation episode after surgery which resulted increased shortness of breath, no abnormal telemetry reported  1/27/2025: Patient continues to diurese well.  Is feeling better this morning.  Labs show creatinine stable at 2.1.  Potassium within range and magnesium normal.  H&H stable overnight.  No new leukocytosis noted. I/O -3870 since admit.  Cardiology following.     Addendum:  Seen by Dr. Ennis covering for cardiology this morning and cleared for discharge from cardiac standpoint.

## 2025-01-26 NOTE — H&P
[facility] Emergency Department  [facility] Medicine  History & Physical    Patient Name: Leonidas Bautista  MRN: [id number]  Patient Class: Emergency  Admission Date: 1/24/2025  Attending Physician: Kaleb Collier Jr., MD   Primary Care Provider: Ann Primary Doctor         Patient information was obtained from patient, relative(s), and ER records.     Subjective:     Principal Problem:Acute on chronic congestive heart failure    Chief Complaint:   Chief Complaint   Patient presents with    Shortness of Breath     Pt referred to ED by Dr. Ybarra for admission for heart failure / anemia - complaints of worsening dyspnea - recent CABG.        HPI: Patient 74-year-old male past medical history of CAD, recent quadruple bypass earlier this month, AFib hypertension, diastolic dysfunction, carotid artery disease, Graves disease, hyperlipidemia and tobacco abuse was sent to ED by cardiologist for shortness of breath and hypoxemia, secondary to volume overload, patient was evaluated in the ED noted to have hyponatremia, blood pressure stable, afebrile, bilateral lower extremity edema, given IV Lasix admitted for diuresis    Past Medical History:   Diagnosis Date    Abnormal nuclear stress test 01/05/2025    Bilateral carotid artery stenosis 01/05/2025    CAD S/P percutaneous coronary angioplasty 2003    Stent mid LAD July 15, 2003    Carotid artery disease     Coronary artery disease involving native coronary artery of native heart without angina pectoris 01/05/2025    Diastolic dysfunction     Essential (primary) hypertension     Resistent    Graves disease     hyperthroidism    Hyperlipidemia     Hypertensive heart disease     Other hyperlipidemia 01/05/2025    Presence of drug coated stent in LAD coronary artery 01/05/2025    Primary hypertension 01/05/2025       Past Surgical History:   Procedure Laterality Date    ANGIOGRAPHY OF INTERNAL MAMMARY VESSEL Left 1/6/2025    Procedure: Angiogram Internal Mammary;  Surgeon:  Erika Gomes MD;  Location: [facility] CATH LAB;  Service: Cardiology;  Laterality: Left;    ARTERIOGRAPHY OF SUBCLAVIAN ARTERY Left 1/6/2025    Procedure: ARTERIOGRAM, SUBCLAVIAN;  Surgeon: Erika Gomes MD;  Location: [facility] CATH LAB;  Service: Cardiology;  Laterality: Left;    CORONARY ARTERY BYPASS GRAFT (CABG) N/A 1/7/2025    Procedure: CORONARY ARTERY BYPASS GRAFT (CABG);  Surgeon: Sidra Guardado MD;  Location: [facility] OR;  Service: Cardiothoracic;  Laterality: N/A;  CABG x    ECHOCARDIOGRAM,TRANSESOPHAGEAL N/A 1/7/2025    Procedure: ECHOCARDIOGRAM,TRANSESOPHAGEAL;  Surgeon: Sidra Guardado MD;  Location: [facility] OR;  Service: Cardiothoracic;  Laterality: N/A;    ENDOSCOPIC HARVEST OF VEIN Right 1/7/2025    Procedure: SURGICAL PROCUREMENT, VEIN, ENDOSCOPIC;  Surgeon: Sidra Guardado MD;  Location: [facility] OR;  Service: Cardiothoracic;  Laterality: Right;    INJECTION OF ANESTHETIC AGENT AROUND MULTIPLE INTERCOSTAL NERVES N/A 1/7/2025    Procedure: BLOCK, NERVE, INTERCOSTAL, 2 OR MORE;  Surgeon: Sidra Guardado MD;  Location: [facility] OR;  Service: Cardiothoracic;  Laterality: N/A;  Para sternal nerve lock    LEFT HEART CATHETERIZATION Left 1/6/2025    Procedure: Left heart cath;  Surgeon: Erika Gomes MD;  Location: [facility] CATH LAB;  Service: Cardiology;  Laterality: Left;    ptca with Stent mid LAD in 2003 N/A        Review of patient's allergies indicates:  No Known Allergies    No current facility-administered medications on file prior to encounter.     Current Outpatient Medications on File Prior to Encounter   Medication Sig    amiodarone (PACERONE) 200 MG Tab Take 1 tablet (200 mg total) by mouth once daily.    apixaban (ELIQUIS) 5 mg Tab Take 1 tablet (5 mg total) by mouth 2 (two) times daily.    atorvastatin (LIPITOR) 40 MG tablet Take 1 tablet (40 mg total) by mouth every evening.    clopidogreL (PLAVIX) 75 mg tablet Take 1 tablet (75 mg total) by mouth once daily.    fluticasone furoate-vilanteroL  (BREO) 100-25 mcg/dose diskus inhaler Inhale 1 puff into the lungs once daily. Controller    furosemide (LASIX) 20 MG tablet Take 1 tablet (20 mg total) by mouth once daily.    isosorbide-hydrALAZINE 20-37.5 mg (BIDIL) 20-37.5 mg Tab Take 1 tablet by mouth 3 (three) times daily.    methIMAzole (TAPAZOLE) 5 MG Tab Take 10 mg by mouth once daily.    metoprolol succinate (TOPROL-XL) 50 MG 24 hr tablet Take 50 mg by mouth once daily.    NIFEdipine (PROCARDIA-XL) 30 MG (OSM) 24 hr tablet Take 30 mg by mouth every evening.    nitroGLYCERIN 0.4 MG/DOSE TL SPRY (NITROLINGUAL) 400 mcg/spray spray Place 1 spray under the tongue every 5 (five) minutes as needed for Chest pain.    omega-3 fatty acids/fish oil (FISH OIL-OMEGA-3 FATTY ACIDS) 300-1,000 mg capsule Take 1 capsule by mouth once daily.    oxyCODONE (ROXICODONE) 5 MG immediate release tablet Take 1 tablet (5 mg total) by mouth every 4 (four) hours as needed for Pain.    tiotropium bromide (SPIRIVA RESPIMAT) 2.5 mcg/actuation inhaler Inhale 2 puffs into the lungs every evening. Controller     Family History    None       Tobacco Use    Smoking status: Former     Types: Cigarettes     Passive exposure: Past    Smokeless tobacco: Never   Substance and Sexual Activity    Alcohol use: Not Currently    Drug use: Not on file    Sexual activity: Not on file     Review of Systems   Constitutional:  Negative for chills, fatigue and fever.   HENT:  Negative for congestion.    Eyes:  Negative for visual disturbance.   Respiratory:  Positive for shortness of breath. Negative for wheezing.    Cardiovascular:  Positive for leg swelling. Negative for chest pain and palpitations.   Gastrointestinal:  Negative for abdominal pain, constipation, diarrhea, nausea and vomiting.   Endocrine: Negative for polyuria.   Genitourinary:  Negative for difficulty urinating and dysuria.   Musculoskeletal:  Positive for arthralgias. Negative for back pain and gait problem.   Skin:  Negative for wound.    Neurological:  Negative for weakness.   Psychiatric/Behavioral:  The patient is not nervous/anxious.      Objective:     Vital Signs (Most Recent):  Temp: 97.7 °F (36.5 °C) (01/24/25 1508)  Pulse: 64 (01/25/25 0145)  Resp: (!) 35 (01/25/25 0145)  BP: (!) 141/71 (01/25/25 0145)  SpO2: 96 % (01/25/25 0145) Vital Signs (24h Range):  Temp:  [97.7 °F (36.5 °C)] 97.7 °F (36.5 °C)  Pulse:  [51-64] 64  Resp:  [18-42] 35  SpO2:  [73 %-98 %] 96 %  BP: (103-169)/(61-92) 141/71     Weight: 77.1 kg (170 lb)  Body mass index is 24.39 kg/m².     Physical Exam  Vitals and nursing note reviewed.   Constitutional:       General: He is not in acute distress.     Appearance: He is well-developed. He is not diaphoretic.   HENT:      Head: Normocephalic and atraumatic.      Nose: No congestion or rhinorrhea.   Eyes:      General: No scleral icterus.        Right eye: No discharge.         Left eye: No discharge.      Extraocular Movements: Extraocular movements intact.   Neck:      Thyroid: No thyromegaly.      Vascular: No JVD.   Cardiovascular:      Rate and Rhythm: Normal rate.   Pulmonary:      Effort: No respiratory distress.      Breath sounds: Rales present. No wheezing or rhonchi.   Chest:      Comments: Sternotomy incision noted  Abdominal:      General: There is no distension.      Tenderness: There is no abdominal tenderness. There is no guarding or rebound.   Musculoskeletal:      Right lower leg: Edema present.      Left lower leg: Edema present.   Skin:     General: Skin is warm and dry.      Capillary Refill: Capillary refill takes less than 2 seconds.   Neurological:      Mental Status: He is alert and oriented to person, place, and time. Mental status is at baseline.   Psychiatric:         Behavior: Behavior normal.         Thought Content: Thought content normal.                Significant Labs: All pertinent labs within the past 24 hours have been reviewed.  Recent Lab Results         01/24/25  1514   01/24/25  1504   "      Albumin 3.3         ALP 96         ALT 31         Anion Gap 8         AST 48         Baso # 0.01         Basophil % 0.1         BILIRUBIN TOTAL 0.3  Comment: For infants and newborns, interpretation of results should be based  on gestational age, weight and in agreement with clinical  observations.    Premature Infant recommended reference ranges:  Up to 24 hours.............<8.0 mg/dL  Up to 48 hours............<12.0 mg/dL  3-5 days..................<15.0 mg/dL  6-29 days.................<15.0 mg/dL           BUN 26         Calcium 8.7         Chloride 94         CO2 27         Creatinine 2.0         Differential Method Automated         eGFR 34.4         Eos # 0.0         Eos % 0.1         Glucose 134         Gran # (ANC) 10.3         Gran % 89.2         Hematocrit 26.9         Hemoglobin 8.5         Immature Grans (Abs) 0.04  Comment: Mild elevation in immature granulocytes is non specific and   can be seen in a variety of conditions including stress response,   acute inflammation, trauma and pregnancy. Correlation with other   laboratory and clinical findings is essential.           Immature Granulocytes 0.3         Lymph # 0.7         Lymph % 6.3         MCH 32.8         MCHC 31.6                  Mono # 0.5         Mono % 4.0         MPV 8.6         nRBC 0         NT-proBNP 7792         QRS Duration   104       OHS QTC Calculation   439       Platelet Count 498         Potassium 4.5         PROTEIN TOTAL 6.0         RBC 2.59         RDW 14.3         Sodium 129         Troponin I High Sensitivity 47         WBC 11.56                 Significant Imaging: I have reviewed all pertinent imaging results/findings within the past 24 hours.  Assessment/Plan:     * Acute on chronic congestive heart failure  Patient has Diastolic (HFpEF) heart failure that is Acute on chronic. Most recent BNP and echo results are listed below.  No results for input(s): "BNP" in the last 72 hours.  Latest ECHO  Results for " orders placed during the hospital encounter of 01/14/25    Echo    Interpretation Summary    Technically difficult study.    Left Ventricle: The left ventricle is normal in size. Increased wall thickness. There is normal systolic function with a visually estimated ejection fraction of 55 - 60%.    Right Ventricle: Right ventricle was not well visualized due to poor acoustic window.    Aortic Valve: There is no stenosis. There is no significant regurgitation.    Mitral Valve: There is mild regurgitation.    Tricuspid Valve: There is trace regurgitation.    Pericardium: There is no pericardial effusion. Left pleural effusion.    Current Heart Failure Medications  furosemide injection 40 mg, Every 12 hours (non-standard times), Intravenous    Plan  - Monitor strict I&Os and daily weights.    - Place on telemetry  - Low sodium diet  - Place on fluid restriction of 1.5 L.   - Cardiology has been consulted  - The patient's volume status is improving but not at their baseline as indicated by edema and shortness of breath        Paroxysmal atrial fibrillation  Patient has persistent (7 days or more) atrial fibrillation. Patient is currently in sinus rhythm. PORAR2PVIs Score: 2. The patients heart rate in the last 24 hours is as follows:  Pulse  Min: 51  Max: 64     Antiarrhythmics  amiodarone tablet 200 mg, Daily, Oral    Anticoagulants  apixaban tablet 5 mg, 2 times daily, Oral    Plan  - Replete lytes with a goal of K>4, Mg >2  - Patient is anticoagulated, see medications listed above.  - Patient's afib is currently controlled        Graves' disease  Continue home dose methimazole      Stage 3b chronic kidney disease  Creatine stable for now. BMP reviewed- noted Estimated Creatinine Clearance: 33.5 mL/min (A) (based on SCr of 2 mg/dL (H)). according to latest data. Based on current GFR, CKD stage is stage 3 - GFR 30-59.  Monitor UOP and serial BMP and adjust therapy as needed. Renally dose meds. Avoid nephrotoxic  medications and procedures.    CAD, multiple vessel  Patient with known CAD s/p  recent quadruple bypass 1/6/25, stent placement, and CABG, which is controlled Will continue ASA, Plavix, and Statin and monitor for S/Sx of angina/ACS. Continue to monitor on telemetry.     Other hyperlipidemia  Lipitor    Primary hypertension  Patient's blood pressure range in the last 24 hours was: BP  Min: 103/64  Max: 169/88.The patient's inpatient anti-hypertensive regimen is listed below:  Current Antihypertensives  furosemide injection 40 mg, Every 12 hours (non-standard times), Intravenous    Plan  - BP is controlled, no changes needed to their regimen  - adjust p.r.n.      VTE Risk Mitigation (From admission, onward)           Ordered     apixaban tablet 5 mg  2 times daily         01/25/25 0912     IP VTE HIGH RISK PATIENT  Once         01/24/25 1731     Place sequential compression device  Until discontinued         01/24/25 1731                                    Rohan Mead MD  Department of Hospital Medicine  [place] - Emergency Department

## 2025-01-26 NOTE — PLAN OF CARE
Plan of care reviewed with pt. Pt voiced understanding. Pt AAO X 4. Pt denies any c/o during the shift. No apparent distress noted. Fall precautions maintained. Pt remains free of fall or injury. Bed in lowest position, locked, and call light within reach. Side rails up x's 1 with slip resistant socks on. Pt on telemetry running a. Flutter between 50-70. No true red alarms or ectopy throughout shift.      Problem: Adult Inpatient Plan of Care  Goal: Plan of Care Review  Outcome: Progressing  Flowsheets (Taken 1/26/2025 9799)  Plan of Care Reviewed With: patient  Goal: Absence of Hospital-Acquired Illness or Injury  Outcome: Progressing  Goal: Optimal Comfort and Wellbeing  Outcome: Progressing     Problem: Skin Injury Risk Increased  Goal: Skin Health and Integrity  Outcome: Progressing     Problem: Fall Injury Risk  Goal: Absence of Fall and Fall-Related Injury  Outcome: Progressing     Problem: Fluid Volume Excess  Goal: Fluid Balance  Outcome: Progressing

## 2025-01-26 NOTE — SUBJECTIVE & OBJECTIVE
Interval History:  Patient seen and examined.    Review of Systems   Constitutional:  Negative for chills, fatigue and fever.   HENT:  Negative for congestion.    Eyes:  Negative for visual disturbance.   Respiratory:  Positive for shortness of breath. Negative for wheezing.    Cardiovascular:  Positive for leg swelling. Negative for chest pain and palpitations.   Gastrointestinal:  Negative for abdominal pain, constipation, diarrhea, nausea and vomiting.   Endocrine: Negative for polyuria.   Genitourinary:  Negative for difficulty urinating and dysuria.   Musculoskeletal:  Positive for arthralgias. Negative for back pain and gait problem.   Skin:  Negative for wound.   Neurological:  Negative for weakness.   Psychiatric/Behavioral:  The patient is not nervous/anxious.      Objective:     Vital Signs (Most Recent):  Temp: 98.2 °F (36.8 °C) (01/26/25 1057)  Pulse: 70 (01/26/25 1057)  Resp: 18 (01/26/25 1057)  BP: 126/64 (01/26/25 1057)  SpO2: 96 % (01/26/25 1057) Vital Signs (24h Range):  Temp:  [96.9 °F (36.1 °C)-98.8 °F (37.1 °C)] 98.2 °F (36.8 °C)  Pulse:  [61-70] 70  Resp:  [18-33] 18  SpO2:  [93 %-100 %] 96 %  BP: (123-164)/(58-80) 126/64     Weight: 77.1 kg (170 lb)  Body mass index is 24.39 kg/m².    Intake/Output Summary (Last 24 hours) at 1/26/2025 1249  Last data filed at 1/26/2025 0405  Gross per 24 hour   Intake 480 ml   Output 1500 ml   Net -1020 ml         Physical Exam  Vitals and nursing note reviewed.   Constitutional:       General: He is not in acute distress.     Appearance: He is well-developed. He is not diaphoretic.   HENT:      Head: Normocephalic and atraumatic.      Nose: No congestion or rhinorrhea.   Eyes:      General: No scleral icterus.        Right eye: No discharge.         Left eye: No discharge.      Extraocular Movements: Extraocular movements intact.   Neck:      Thyroid: No thyromegaly.      Vascular: No JVD.   Cardiovascular:      Rate and Rhythm: Normal rate.   Pulmonary:       Effort: No respiratory distress.      Breath sounds: Rales present. No wheezing or rhonchi.   Chest:      Comments: Sternotomy incision noted  Abdominal:      General: There is no distension.      Tenderness: There is no abdominal tenderness. There is no guarding or rebound.   Musculoskeletal:      Right lower leg: Edema present.      Left lower leg: Edema present.   Skin:     General: Skin is warm and dry.      Capillary Refill: Capillary refill takes less than 2 seconds.   Neurological:      Mental Status: He is alert and oriented to person, place, and time. Mental status is at baseline.   Psychiatric:         Behavior: Behavior normal.         Thought Content: Thought content normal.             Significant Labs: All pertinent labs within the past 24 hours have been reviewed.  Recent Lab Results         01/26/25  0444        Albumin 3.0       ALP 93       ALT 27       Anion Gap 8       AST 35       Baso # 0.02       Basophil % 0.2       BILIRUBIN TOTAL 0.3  Comment: For infants and newborns, interpretation of results should be based  on gestational age, weight and in agreement with clinical  observations.    Premature Infant recommended reference ranges:  Up to 24 hours.............<8.0 mg/dL  Up to 48 hours............<12.0 mg/dL  3-5 days..................<15.0 mg/dL  6-29 days.................<15.0 mg/dL         BUN 24       Calcium 8.4       Chloride 92       CO2 31       Creatinine 2.1       Differential Method Automated       eGFR 32.4       Eos # 0.0       Eos % 0.4       Glucose 100       Gran # (ANC) 6.6       Gran % 73.8       Hematocrit 28.0       Hemoglobin 9.0       Immature Grans (Abs) 0.02  Comment: Mild elevation in immature granulocytes is non specific and   can be seen in a variety of conditions including stress response,   acute inflammation, trauma and pregnancy. Correlation with other   laboratory and clinical findings is essential.         Immature Granulocytes 0.2       Lymph # 1.4        Lymph % 15.4       Magnesium  2.0       MCH 32.6       MCHC 32.1              Mono # 0.9       Mono % 10.0       MPV 8.4       nRBC 0       Platelet Count 434       Potassium 4.0       PROTEIN TOTAL 5.5       RBC 2.76       RDW 14.0       Sodium 131       WBC 8.94               Significant Imaging: I have reviewed all pertinent imaging results/findings within the past 24 hours.

## 2025-01-27 VITALS
HEIGHT: 70 IN | RESPIRATION RATE: 18 BRPM | HEART RATE: 71 BPM | SYSTOLIC BLOOD PRESSURE: 114 MMHG | OXYGEN SATURATION: 93 % | BODY MASS INDEX: 24.34 KG/M2 | TEMPERATURE: 98 F | WEIGHT: 170 LBS | DIASTOLIC BLOOD PRESSURE: 72 MMHG

## 2025-01-27 LAB
ALBUMIN SERPL BCP-MCNC: 3 G/DL (ref 3.5–5.2)
ALP SERPL-CCNC: 95 U/L (ref 55–135)
ALT SERPL W/O P-5'-P-CCNC: 27 U/L (ref 10–44)
ANION GAP SERPL CALC-SCNC: 8 MMOL/L (ref 8–16)
AST SERPL-CCNC: 40 U/L (ref 10–40)
BASOPHILS # BLD AUTO: 0.02 K/UL (ref 0–0.2)
BASOPHILS NFR BLD: 0.2 % (ref 0–1.9)
BILIRUB SERPL-MCNC: 0.4 MG/DL (ref 0.1–1)
BUN SERPL-MCNC: 24 MG/DL (ref 8–23)
CALCIUM SERPL-MCNC: 8.6 MG/DL (ref 8.7–10.5)
CHLORIDE SERPL-SCNC: 92 MMOL/L (ref 95–110)
CO2 SERPL-SCNC: 34 MMOL/L (ref 23–29)
CREAT SERPL-MCNC: 2.1 MG/DL (ref 0.5–1.4)
DIFFERENTIAL METHOD BLD: ABNORMAL
EOSINOPHIL # BLD AUTO: 0 K/UL (ref 0–0.5)
EOSINOPHIL NFR BLD: 0.2 % (ref 0–8)
ERYTHROCYTE [DISTWIDTH] IN BLOOD BY AUTOMATED COUNT: 13.9 % (ref 11.5–14.5)
EST. GFR  (NO RACE VARIABLE): 32.4 ML/MIN/1.73 M^2
GLUCOSE SERPL-MCNC: 97 MG/DL (ref 70–110)
HCT VFR BLD AUTO: 27.4 % (ref 40–54)
HGB BLD-MCNC: 8.9 G/DL (ref 14–18)
IMM GRANULOCYTES # BLD AUTO: 0.02 K/UL (ref 0–0.04)
IMM GRANULOCYTES NFR BLD AUTO: 0.2 % (ref 0–0.5)
LYMPHOCYTES # BLD AUTO: 1.2 K/UL (ref 1–4.8)
LYMPHOCYTES NFR BLD: 15.1 % (ref 18–48)
MAGNESIUM SERPL-MCNC: 1.9 MG/DL (ref 1.6–2.6)
MCH RBC QN AUTO: 33 PG (ref 27–31)
MCHC RBC AUTO-ENTMCNC: 32.5 G/DL (ref 32–36)
MCV RBC AUTO: 102 FL (ref 82–98)
MONOCYTES # BLD AUTO: 1 K/UL (ref 0.3–1)
MONOCYTES NFR BLD: 11.8 % (ref 4–15)
NEUTROPHILS # BLD AUTO: 5.9 K/UL (ref 1.8–7.7)
NEUTROPHILS NFR BLD: 72.5 % (ref 38–73)
NRBC BLD-RTO: 0 /100 WBC
PLATELET # BLD AUTO: 443 K/UL (ref 150–450)
PMV BLD AUTO: 8.8 FL (ref 9.2–12.9)
POTASSIUM SERPL-SCNC: 3.9 MMOL/L (ref 3.5–5.1)
PROT SERPL-MCNC: 5.5 G/DL (ref 6–8.4)
RBC # BLD AUTO: 2.7 M/UL (ref 4.6–6.2)
SODIUM SERPL-SCNC: 134 MMOL/L (ref 136–145)
WBC # BLD AUTO: 8.15 K/UL (ref 3.9–12.7)

## 2025-01-27 PROCEDURE — 94640 AIRWAY INHALATION TREATMENT: CPT

## 2025-01-27 PROCEDURE — 63600175 PHARM REV CODE 636 W HCPCS: Performed by: EMERGENCY MEDICINE

## 2025-01-27 PROCEDURE — 27000221 HC OXYGEN, UP TO 24 HOURS

## 2025-01-27 PROCEDURE — 85025 COMPLETE CBC W/AUTO DIFF WBC: CPT | Performed by: INTERNAL MEDICINE

## 2025-01-27 PROCEDURE — 94761 N-INVAS EAR/PLS OXIMETRY MLT: CPT

## 2025-01-27 PROCEDURE — 25000003 PHARM REV CODE 250: Performed by: INTERNAL MEDICINE

## 2025-01-27 PROCEDURE — 99900031 HC PATIENT EDUCATION (STAT)

## 2025-01-27 PROCEDURE — 25000242 PHARM REV CODE 250 ALT 637 W/ HCPCS: Performed by: INTERNAL MEDICINE

## 2025-01-27 PROCEDURE — 36415 COLL VENOUS BLD VENIPUNCTURE: CPT | Performed by: INTERNAL MEDICINE

## 2025-01-27 PROCEDURE — 80053 COMPREHEN METABOLIC PANEL: CPT | Performed by: INTERNAL MEDICINE

## 2025-01-27 PROCEDURE — 99900035 HC TECH TIME PER 15 MIN (STAT)

## 2025-01-27 PROCEDURE — 83735 ASSAY OF MAGNESIUM: CPT | Performed by: INTERNAL MEDICINE

## 2025-01-27 RX ORDER — FUROSEMIDE 20 MG/1
20 TABLET ORAL 2 TIMES DAILY
Qty: 60 TABLET | Refills: 11 | Status: SHIPPED | OUTPATIENT
Start: 2025-01-27 | End: 2026-01-27

## 2025-01-27 RX ADMIN — METHIMAZOLE 10 MG: 10 TABLET ORAL at 08:01

## 2025-01-27 RX ADMIN — ARFORMOTEROL TARTRATE 15 MCG: 15 SOLUTION RESPIRATORY (INHALATION) at 07:01

## 2025-01-27 RX ADMIN — FUROSEMIDE 40 MG: 10 INJECTION, SOLUTION INTRAMUSCULAR; INTRAVENOUS at 04:01

## 2025-01-27 RX ADMIN — BUDESONIDE 0.5 MG: 0.5 INHALANT RESPIRATORY (INHALATION) at 07:01

## 2025-01-27 RX ADMIN — APIXABAN 5 MG: 5 TABLET, FILM COATED ORAL at 08:01

## 2025-01-27 RX ADMIN — CLOPIDOGREL BISULFATE 75 MG: 75 TABLET ORAL at 08:01

## 2025-01-27 RX ADMIN — AMIODARONE HYDROCHLORIDE 200 MG: 200 TABLET ORAL at 08:01

## 2025-01-27 NOTE — DISCHARGE SUMMARY
[facility] Emergency Department  Hospital Medicine  Discharge Summary      Patient Name: Leonidas Bautista  MRN: [id number]  LU: [id number]  Patient Class: IP- Inpatient  Admission Date: 1/24/2025  Hospital Length of Stay: 1 days  Discharge Date and Time:  01/27/2025 10:46 AM  Attending Physician: Kaleb Collier Jr., MD   Discharging Provider: KASEY Layton  Primary Care Provider: Ann Primary Doctor    Primary Care Team: Networked reference to record PCT     HPI:   Patient 74-year-old male past medical history of CAD, recent quadruple bypass earlier this month, AFib hypertension, diastolic dysfunction, carotid artery disease, Graves disease, hyperlipidemia and tobacco abuse was sent to ED by cardiologist for shortness of breath and hypoxemia, secondary to volume overload, patient was evaluated in the ED noted to have hyponatremia, blood pressure stable, afebrile, bilateral lower extremity edema, given IV Lasix admitted for diuresis    * No surgery found *      Hospital Course:   1/26 AA, weekend crosscover, no acute events reported overnight, diuresis in progress, blood pressure stable, patient reported having discrepancy with medication list, patient was sent by cardiologist for admission, will consult Cardiology, need clarification on medication, patient reported multiple episodes palpitation episode after surgery which resulted increased shortness of breath, no abnormal telemetry reported  1/27/2025: Patient continues to diurese well.  Is feeling better this morning.  Labs show creatinine stable at 2.1.  Potassium within range and magnesium normal.  H&H stable overnight.  No new leukocytosis noted. I/O -3870 since admit.  Cardiology following.     Addendum:  Seen by Dr. Ennis covering for cardiology this morning and cleared for discharge from cardiac standpoint.       Goals of Care Treatment Preferences:  Code Status: Full Code         Consults:   Consults (From admission, onward)          Status Ordering  Provider     Inpatient consult to Cardiology  Once        Provider:  Diomedes Ennis MD    Completed FEMI DOMINIQUE            * Acute on chronic congestive heart failure  Patient has Diastolic (HFpEF) heart failure that is Acute on chronic. Most recent BNP and echo results are listed below.  Recent Labs     01/26/25  1128   *       Latest ECHO  Results for orders placed during the hospital encounter of 01/14/25    Echo    Interpretation Summary    Technically difficult study.    Left Ventricle: The left ventricle is normal in size. Increased wall thickness. There is normal systolic function with a visually estimated ejection fraction of 55 - 60%.    Right Ventricle: Right ventricle was not well visualized due to poor acoustic window.    Aortic Valve: There is no stenosis. There is no significant regurgitation.    Mitral Valve: There is mild regurgitation.    Tricuspid Valve: There is trace regurgitation.    Pericardium: There is no pericardial effusion. Left pleural effusion.    Current Heart Failure Medications  furosemide injection 40 mg, Every 12 hours (non-standard times), Intravenous    Plan  - Monitor strict I&Os and daily weights.    - Place on telemetry  - Low sodium diet  - Place on fluid restriction of 1.5 L.   - Cardiology has been consulted  - The patient's volume status is improving but not at their baseline as indicated by edema and shortness of breath  1/26 diuresis in progress, patient negative 2.8 L since admission, patient had reported having episodes of palpitation with associated dyspnea, reported one episode of LOC, no abnormal telemetry reported, with patient being two weeks post CABG consult cardiology, may need repeat echo  1/27/25:  I/O -3780 since admit.  Renal function stable.  Continue diuresis.  Addendum-seen by Cardiology this morning and cleared for discharge.  We will need follow up with his cardiologist after discharge.        Paroxysmal atrial fibrillation  Patient has  persistent (7 days or more) atrial fibrillation. Patient is currently in sinus rhythm. [id number] Score: 2. The patients heart rate in the last 24 hours is as follows:  Pulse  Min: 66  Max: 85     Antiarrhythmics  amiodarone tablet 200 mg, Daily, Oral    Anticoagulants  apixaban tablet 5 mg, 2 times daily, Oral    Plan  - Replete lytes with a goal of K>4, Mg >2  - Patient is anticoagulated, see medications listed above.  - Patient's afib is currently controlled        Graves' disease  Continue home dose methimazole      Stage 3b chronic kidney disease  Creatine stable for now. BMP reviewed- noted Estimated Creatinine Clearance: 31.9 mL/min (A) (based on SCr of 2.1 mg/dL (H)). according to latest data. Based on current GFR, CKD stage is stage 3 - GFR 30-59.  Monitor UOP and serial BMP and adjust therapy as needed. Renally dose meds. Avoid nephrotoxic medications and procedures.      S/P CABG x 4        CAD, multiple vessel  Patient with known CAD s/p  recent quadruple bypass 1/6/25, stent placement, and CABG, which is controlled Will continue ASA, Plavix, and Statin and monitor for S/Sx of angina/ACS. Continue to monitor on telemetry.     Other hyperlipidemia  Lipitor    Primary hypertension  Patient's blood pressure range in the last 24 hours was: BP  Min: 120/69  Max: 146/68.The patient's inpatient anti-hypertensive regimen is listed below:  Current Antihypertensives  furosemide injection 40 mg, Every 12 hours (non-standard times), Intravenous    Plan  - BP is controlled, no changes needed to their regimen  - adjust p.r.n.      Final Active Diagnoses:    Diagnosis Date Noted POA    PRINCIPAL PROBLEM:  Acute on chronic congestive heart failure [I50.9] 01/25/2025 Yes    Paroxysmal atrial fibrillation [I48.0] 01/09/2025 Yes    Stage 3b chronic kidney disease [N18.32] 01/07/2025 Yes    Graves' disease [E05.00] 01/07/2025 Yes    CAD, multiple vessel [I25.10] 01/07/2025 Yes    S/P CABG x 4 [Z95.1] 01/07/2025 Not  Applicable    Primary hypertension [I10] 01/05/2025 Yes    Other hyperlipidemia [E78.49] 01/05/2025 Yes      Problems Resolved During this Admission:       Discharged Condition: good    Disposition:     Follow Up:    Patient Instructions:   No discharge procedures on file.    Significant Diagnostic Studies: Labs: All labs within the past 24 hours have been reviewed    Pending Diagnostic Studies:       None           Medications:  Reconciled Home Medications:      Medication List        ASK your doctor about these medications      amiodarone 200 MG Tab  Commonly known as: PACERONE  Take 1 tablet (200 mg total) by mouth once daily.     apixaban 5 mg Tab  Commonly known as: ELIQUIS  Take 1 tablet (5 mg total) by mouth 2 (two) times daily.     atorvastatin 40 MG tablet  Commonly known as: LIPITOR  Take 1 tablet (40 mg total) by mouth every evening.     clopidogreL 75 mg tablet  Commonly known as: PLAVIX  Take 1 tablet (75 mg total) by mouth once daily.     fish oil-omega-3 fatty acids 300-1,000 mg capsule  Take 1 capsule by mouth once daily.     fluticasone furoate-vilanteroL 100-25 mcg/dose diskus inhaler  Commonly known as: BREO  Inhale 1 puff into the lungs once daily. Controller     furosemide 20 MG tablet  Commonly known as: LASIX  Take 1 tablet (20 mg total) by mouth once daily.     isosorbide-hydrALAZINE 20-37.5 mg 20-37.5 mg Tab  Commonly known as: BIDIL  Take 1 tablet by mouth 3 (three) times daily.     methIMAzole 5 MG Tab  Commonly known as: TAPAZOLE  Take 10 mg by mouth once daily.     metoprolol succinate 50 MG 24 hr tablet  Commonly known as: TOPROL-XL  Take 50 mg by mouth once daily.     NIFEdipine 30 MG (OSM) 24 hr tablet  Commonly known as: PROCARDIA-XL  Take 30 mg by mouth every evening.     nitroGLYCERIN 0.4 MG/DOSE TL SPRY 400 mcg/spray spray  Commonly known as: NITROLINGUAL  Place 1 spray under the tongue every 5 (five) minutes as needed for Chest pain.     oxyCODONE 5 MG immediate release  tablet  Commonly known as: ROXICODONE  Take 1 tablet (5 mg total) by mouth every 4 (four) hours as needed for Pain.     tiotropium bromide 2.5 mcg/actuation inhaler  Commonly known as: SPIRIVA RESPIMAT  Inhale 2 puffs into the lungs every evening. Controller              Indwelling Lines/Drains at time of discharge:   Lines/Drains/Airways       Drain  Duration             Male External Urinary Catheter 01/26/25 1520 <1 day                    Time spent on the discharge of patient: 35 minutes         KASEY Layton  Department of Hospital Medicine  [place] - Emergency Department

## 2025-01-27 NOTE — NURSING
Pt condition stable throughout the night. Continued IV Lasix therapy. Pt urinary output 900 cc throughout the night not including an unmeasured occurrence of output which soaked the brief and sheets. Pt A&Ox4 with occasional disorientation to time throughout the night, however transient in nature. Pt able to ambulate without assistance for brief periods while not on supplemental oxygen. Pt maintained on 2-3L NC for SATs of 95-96%. Pt moved to ED Room 8 for purpose of cardiac monitoring. Personal items moved with pt. Male purewick connected. Pt VS WDL except hypertensive (known hx).

## 2025-01-27 NOTE — ASSESSMENT & PLAN NOTE
Creatine stable for now. BMP reviewed- noted Estimated Creatinine Clearance: 31.9 mL/min (A) (based on SCr of 2.1 mg/dL (H)). according to latest data. Based on current GFR, CKD stage is stage 3 - GFR 30-59.  Monitor UOP and serial BMP and adjust therapy as needed. Renally dose meds. Avoid nephrotoxic medications and procedures.

## 2025-01-27 NOTE — PLAN OF CARE
01/27/25 1117   Medicare Message   Important Message from Medicare regarding Discharge Appeal Rights Given to patient/caregiver;Signed/date by patient/caregiver;Explained to patient/caregiver;Other (comments)  (completed by registration)   Date IMM was signed 01/26/25   Time IMM was signed [id number]

## 2025-01-27 NOTE — ASSESSMENT & PLAN NOTE
Patient has Diastolic (HFpEF) heart failure that is Acute on chronic. Most recent BNP and echo results are listed below.  Recent Labs     01/26/25  1128   *     Latest ECHO  Results for orders placed during the hospital encounter of 01/14/25    Echo    Interpretation Summary    Technically difficult study.    Left Ventricle: The left ventricle is normal in size. Increased wall thickness. There is normal systolic function with a visually estimated ejection fraction of 55 - 60%.    Right Ventricle: Right ventricle was not well visualized due to poor acoustic window.    Aortic Valve: There is no stenosis. There is no significant regurgitation.    Mitral Valve: There is mild regurgitation.    Tricuspid Valve: There is trace regurgitation.    Pericardium: There is no pericardial effusion. Left pleural effusion.    Current Heart Failure Medications  furosemide injection 40 mg, Every 12 hours (non-standard times), Intravenous    Plan  - Monitor strict I&Os and daily weights.    - Place on telemetry  - Low sodium diet  - Place on fluid restriction of 1.5 L.   - Cardiology has been consulted  - The patient's volume status is improving but not at their baseline as indicated by edema and shortness of breath  1/26 diuresis in progress, patient negative 2.8 L since admission, patient had reported having episodes of palpitation with associated dyspnea, reported one episode of LOC, no abnormal telemetry reported, with patient being two weeks post CABG consult cardiology, may need repeat echo  1/27/25:  I/O -3780 since admit.  Renal function stable.  Continue diuresis.

## 2025-01-27 NOTE — SUBJECTIVE & OBJECTIVE
Review of Systems   Constitutional:  Negative for chills, fatigue and fever.   HENT:  Negative for congestion.    Eyes:  Negative for visual disturbance.   Respiratory:  Positive for shortness of breath. Negative for wheezing.    Cardiovascular:  Positive for leg swelling. Negative for chest pain and palpitations.   Gastrointestinal:  Negative for abdominal pain, constipation, diarrhea, nausea and vomiting.   Endocrine: Negative for polyuria.   Genitourinary:  Negative for difficulty urinating and dysuria.   Musculoskeletal:  Positive for arthralgias. Negative for back pain and gait problem.   Skin:  Negative for wound.   Neurological:  Negative for weakness.   Psychiatric/Behavioral:  The patient is not nervous/anxious.      Objective:     Vital Signs (Most Recent):  Temp: 98.4 °F (36.9 °C) (01/27/25 0855)  Pulse: 74 (01/27/25 0855)  Resp: 18 (01/27/25 0855)  BP: 120/69 (01/27/25 0855)  SpO2: 97 % (01/27/25 0855) Vital Signs (24h Range):  Temp:  [97.8 °F (36.6 °C)-98.7 °F (37.1 °C)] 98.4 °F (36.9 °C)  Pulse:  [66-85] 74  Resp:  [10-18] 18  SpO2:  [84 %-99 %] 97 %  BP: (120-146)/(61-76) 120/69     Weight: 77.1 kg (170 lb)  Body mass index is 24.39 kg/m².    Intake/Output Summary (Last 24 hours) at 1/27/2025 0940  Last data filed at 1/27/2025 0616  Gross per 24 hour   Intake --   Output 1050 ml   Net -1050 ml         Physical Exam  Vitals and nursing note reviewed.   Constitutional:       General: He is not in acute distress.     Appearance: He is well-developed. He is not diaphoretic.   HENT:      Head: Normocephalic and atraumatic.      Nose: No congestion or rhinorrhea.   Eyes:      General: No scleral icterus.        Right eye: No discharge.         Left eye: No discharge.      Extraocular Movements: Extraocular movements intact.   Neck:      Thyroid: No thyromegaly.      Vascular: No JVD.   Cardiovascular:      Rate and Rhythm: Normal rate.   Pulmonary:      Effort: No respiratory distress.      Breath  sounds: Rales present. No wheezing or rhonchi.   Chest:      Comments: Sternotomy incision noted  Abdominal:      General: There is no distension.      Tenderness: There is no abdominal tenderness. There is no guarding or rebound.   Musculoskeletal:      Right lower leg: Edema present.      Left lower leg: Edema present.   Skin:     General: Skin is warm and dry.      Capillary Refill: Capillary refill takes less than 2 seconds.   Neurological:      Mental Status: He is alert and oriented to person, place, and time. Mental status is at baseline.   Psychiatric:         Behavior: Behavior normal.         Thought Content: Thought content normal.             Significant Labs: All pertinent labs within the past 24 hours have been reviewed.    Significant Imaging: I have reviewed all pertinent imaging results/findings within the past 24 hours.

## 2025-01-27 NOTE — ASSESSMENT & PLAN NOTE
Patient's blood pressure range in the last 24 hours was: BP  Min: 120/69  Max: 146/68.The patient's inpatient anti-hypertensive regimen is listed below:  Current Antihypertensives  furosemide injection 40 mg, Every 12 hours (non-standard times), Intravenous    Plan  - BP is controlled, no changes needed to their regimen  - adjust p.r.n.

## 2025-01-27 NOTE — ASSESSMENT & PLAN NOTE
Patient has persistent (7 days or more) atrial fibrillation. Patient is currently in sinus rhythm. [id number] Score: 2. The patients heart rate in the last 24 hours is as follows:  Pulse  Min: 66  Max: 85     Antiarrhythmics  amiodarone tablet 200 mg, Daily, Oral    Anticoagulants  apixaban tablet 5 mg, 2 times daily, Oral    Plan  - Replete lytes with a goal of K>4, Mg >2  - Patient is anticoagulated, see medications listed above.  - Patient's afib is currently controlled

## 2025-01-27 NOTE — PROGRESS NOTES
[facility] Emergency Department  [place] [facility] Medicine  Progress Note    Patient Name: Leonidas Bautista  MRN: [id number]  Patient Class: IP- Inpatient   Admission Date: 1/24/2025  Length of Stay: 1 days  Attending Physician: Kaleb Collier Jr., MD  Primary Care Provider: Ann, Primary Doctor        Subjective     Principal Problem:Acute on chronic congestive heart failure        HPI:  Patient 74-year-old male past medical history of CAD, recent quadruple bypass earlier this month, AFib hypertension, diastolic dysfunction, carotid artery disease, Graves disease, hyperlipidemia and tobacco abuse was sent to ED by cardiologist for shortness of breath and hypoxemia, secondary to volume overload, patient was evaluated in the ED noted to have hyponatremia, blood pressure stable, afebrile, bilateral lower extremity edema, given IV Lasix admitted for diuresis    Overview/Hospital Course:  1/26 AA, weekend crosscover, no acute events reported overnight, diuresis in progress, blood pressure stable, patient reported having discrepancy with medication list, patient was sent by cardiologist for admission, will consult Cardiology, need clarification on medication, patient reported multiple episodes palpitation episode after surgery which resulted increased shortness of breath, no abnormal telemetry reported  1/27/2025: Patient continues to diurese well.  Is feeling better this morning.  Labs show creatinine stable at 2.1.  Potassium within range and magnesium normal.  H&H stable overnight.  No new leukocytosis noted. I/O -3870 since admit.  Dr. Ybarra following.         Review of Systems   Constitutional:  Negative for chills, fatigue and fever.   HENT:  Negative for congestion.    Eyes:  Negative for visual disturbance.   Respiratory:  Positive for shortness of breath. Negative for wheezing.    Cardiovascular:  Positive for leg swelling. Negative for chest pain and palpitations.   Gastrointestinal:  Negative for abdominal pain,  constipation, diarrhea, nausea and vomiting.   Endocrine: Negative for polyuria.   Genitourinary:  Negative for difficulty urinating and dysuria.   Musculoskeletal:  Positive for arthralgias. Negative for back pain and gait problem.   Skin:  Negative for wound.   Neurological:  Negative for weakness.   Psychiatric/Behavioral:  The patient is not nervous/anxious.      Objective:     Vital Signs (Most Recent):  Temp: 98.4 °F (36.9 °C) (01/27/25 0855)  Pulse: 74 (01/27/25 0855)  Resp: 18 (01/27/25 0855)  BP: 120/69 (01/27/25 0855)  SpO2: 97 % (01/27/25 0855) Vital Signs (24h Range):  Temp:  [97.8 °F (36.6 °C)-98.7 °F (37.1 °C)] 98.4 °F (36.9 °C)  Pulse:  [66-85] 74  Resp:  [10-18] 18  SpO2:  [84 %-99 %] 97 %  BP: (120-146)/(61-76) 120/69     Weight: 77.1 kg (170 lb)  Body mass index is 24.39 kg/m².    Intake/Output Summary (Last 24 hours) at 1/27/2025 0940  Last data filed at 1/27/2025 0616  Gross per 24 hour   Intake --   Output 1050 ml   Net -1050 ml         Physical Exam  Vitals and nursing note reviewed.   Constitutional:       General: He is not in acute distress.     Appearance: He is well-developed. He is not diaphoretic.   HENT:      Head: Normocephalic and atraumatic.      Nose: No congestion or rhinorrhea.   Eyes:      General: No scleral icterus.        Right eye: No discharge.         Left eye: No discharge.      Extraocular Movements: Extraocular movements intact.   Neck:      Thyroid: No thyromegaly.      Vascular: No JVD.   Cardiovascular:      Rate and Rhythm: Normal rate.   Pulmonary:      Effort: No respiratory distress.      Breath sounds: Rales present. No wheezing or rhonchi.   Chest:      Comments: Sternotomy incision noted  Abdominal:      General: There is no distension.      Tenderness: There is no abdominal tenderness. There is no guarding or rebound.   Musculoskeletal:      Right lower leg: Edema present.      Left lower leg: Edema present.   Skin:     General: Skin is warm and dry.       Capillary Refill: Capillary refill takes less than 2 seconds.   Neurological:      Mental Status: He is alert and oriented to person, place, and time. Mental status is at baseline.   Psychiatric:         Behavior: Behavior normal.         Thought Content: Thought content normal.             Significant Labs: All pertinent labs within the past 24 hours have been reviewed.    Significant Imaging: I have reviewed all pertinent imaging results/findings within the past 24 hours.    Assessment and Plan     * Acute on chronic congestive heart failure  Patient has Diastolic (HFpEF) heart failure that is Acute on chronic. Most recent BNP and echo results are listed below.  Recent Labs     01/26/25  1128   *     Latest ECHO  Results for orders placed during the hospital encounter of 01/14/25    Echo    Interpretation Summary    Technically difficult study.    Left Ventricle: The left ventricle is normal in size. Increased wall thickness. There is normal systolic function with a visually estimated ejection fraction of 55 - 60%.    Right Ventricle: Right ventricle was not well visualized due to poor acoustic window.    Aortic Valve: There is no stenosis. There is no significant regurgitation.    Mitral Valve: There is mild regurgitation.    Tricuspid Valve: There is trace regurgitation.    Pericardium: There is no pericardial effusion. Left pleural effusion.    Current Heart Failure Medications  furosemide injection 40 mg, Every 12 hours (non-standard times), Intravenous    Plan  - Monitor strict I&Os and daily weights.    - Place on telemetry  - Low sodium diet  - Place on fluid restriction of 1.5 L.   - Cardiology has been consulted  - The patient's volume status is improving but not at their baseline as indicated by edema and shortness of breath  1/26 diuresis in progress, patient negative 2.8 L since admission, patient had reported having episodes of palpitation with associated dyspnea, reported one episode of LOC,  no abnormal telemetry reported, with patient being two weeks post CABG consult cardiology, may need repeat echo  1/27/25:  I/O -3780 since admit.  Renal function stable.  Continue diuresis.          Paroxysmal atrial fibrillation  Patient has persistent (7 days or more) atrial fibrillation. Patient is currently in sinus rhythm. [id number] Score: 2. The patients heart rate in the last 24 hours is as follows:  Pulse  Min: 66  Max: 85     Antiarrhythmics  amiodarone tablet 200 mg, Daily, Oral    Anticoagulants  apixaban tablet 5 mg, 2 times daily, Oral    Plan  - Replete lytes with a goal of K>4, Mg >2  - Patient is anticoagulated, see medications listed above.  - Patient's afib is currently controlled        Graves' disease  Continue home dose methimazole      Stage 3b chronic kidney disease  Creatine stable for now. BMP reviewed- noted Estimated Creatinine Clearance: 31.9 mL/min (A) (based on SCr of 2.1 mg/dL (H)). according to latest data. Based on current GFR, CKD stage is stage 3 - GFR 30-59.  Monitor UOP and serial BMP and adjust therapy as needed. Renally dose meds. Avoid nephrotoxic medications and procedures.      S/P CABG x 4        CAD, multiple vessel  Patient with known CAD s/p  recent quadruple bypass 1/6/25, stent placement, and CABG, which is controlled Will continue ASA, Plavix, and Statin and monitor for S/Sx of angina/ACS. Continue to monitor on telemetry.     Other hyperlipidemia  Lipitor    Primary hypertension  Patient's blood pressure range in the last 24 hours was: BP  Min: 120/69  Max: 146/68.The patient's inpatient anti-hypertensive regimen is listed below:  Current Antihypertensives  furosemide injection 40 mg, Every 12 hours (non-standard times), Intravenous    Plan  - BP is controlled, no changes needed to their regimen  - adjust p.r.n.      VTE Risk Mitigation (From admission, onward)           Ordered     apixaban tablet 5 mg  2 times daily         01/25/25 0912     IP VTE HIGH RISK  PATIENT  Once         01/24/25 1731     Place sequential compression device  Until discontinued         01/24/25 1731                    Discharge Planning   LETICIA:      Code Status: Full Code   Medical Readiness for Discharge Date:                            KASEY Layton  Department of Hospital Medicine   [place] - Emergency [facility]

## 2025-01-27 NOTE — ED NOTES
Patient ambulates in exam room without assistance. AAOx4, skin W/D/P, cap refill<3 sec, MAEW, NAD, gait steady ,  no assistance needed upon discharge. Wheelchair and assistance offered, patient declined.

## 2025-01-27 NOTE — ED NOTES
End of shift note:         Pt condition stable throughout the night. Continued IV Lasix therapy. Pt urinary output 900 cc throughout the night not including an unmeasured occurrence of output which soaked the brief and sheets. Pt A&Ox4 with occasional disorientation to time throughout the night, however transient in nature. Pt able to ambulate without assistance for brief periods while not on supplemental oxygen. Pt maintained on 2-3L NC for SATs of 95-96%. Pt moved to ED Room 8 for purpose of cardiac monitoring. Personal items moved with pt. Male purewick connected. Pt VS WDL except hypertensive (known hx).

## 2025-01-27 NOTE — ASSESSMENT & PLAN NOTE
Patient has persistent (7 days or more) atrial fibrillation. Patient is currently in sinus rhythm. [id number] Score: 2. The patients heart rate in the last 24 hours is as follows:  Pulse  Min: 66  Max: 85     Antiarrhythmics  amiodarone tablet 200 mg, Daily, Oral    Anticoagulants  apixaban tablet 5 mg, 2 times daily, Oral    Plan  - Replete lytes with a goal of K>4, Mg >2  - Patient is anticoagulated, see medications listed above.  - Patient's afib is currently controlled

## 2025-01-27 NOTE — DISCHARGE SUMMARY
[facility] Emergency Department  Hospital Medicine  Discharge Summary      Patient Name: Leonidas Bautista  MRN: [id number]  LU: [id number]  Patient Class: IP- Inpatient  Admission Date: 1/24/2025  Hospital Length of Stay: 1 days  Discharge Date and Time:  01/27/2025 10:49 AM  Attending Physician: Kaleb Collier Jr., MD   Discharging Provider: KASEY Layton  Primary Care Provider: Ann Primary Doctor    Primary Care Team: Networked reference to record PCT     HPI:   Patient 74-year-old male past medical history of CAD, recent quadruple bypass earlier this month, AFib hypertension, diastolic dysfunction, carotid artery disease, Graves disease, hyperlipidemia and tobacco abuse was sent to ED by cardiologist for shortness of breath and hypoxemia, secondary to volume overload, patient was evaluated in the ED noted to have hyponatremia, blood pressure stable, afebrile, bilateral lower extremity edema, given IV Lasix admitted for diuresis    * No surgery found *      Hospital Course:   1/26 AA, weekend crosscover, no acute events reported overnight, diuresis in progress, blood pressure stable, patient reported having discrepancy with medication list, patient was sent by cardiologist for admission, will consult Cardiology, need clarification on medication, patient reported multiple episodes palpitation episode after surgery which resulted increased shortness of breath, no abnormal telemetry reported  1/27/2025: Patient continues to diurese well.  Is feeling better this morning.  Labs show creatinine stable at 2.1.  Potassium within range and magnesium normal.  H&H stable overnight.  No new leukocytosis noted. I/O -3870 since admit.  Cardiology following.     Addendum:  Seen by Dr. Ennis covering for cardiology this morning and cleared for discharge from cardiac standpoint.       Goals of Care Treatment Preferences:  Code Status: Full Code         Consults:   Consults (From admission, onward)          Status Ordering  Provider     Inpatient consult to Cardiology  Once        Provider:  Diomedes Ennis MD    Completed FEMI DOMINIQUE            * Acute on chronic congestive heart failure  Patient has Diastolic (HFpEF) heart failure that is Acute on chronic. Most recent BNP and echo results are listed below.  Recent Labs     01/26/25  1128   *       Latest ECHO  Results for orders placed during the hospital encounter of 01/14/25    Echo    Interpretation Summary    Technically difficult study.    Left Ventricle: The left ventricle is normal in size. Increased wall thickness. There is normal systolic function with a visually estimated ejection fraction of 55 - 60%.    Right Ventricle: Right ventricle was not well visualized due to poor acoustic window.    Aortic Valve: There is no stenosis. There is no significant regurgitation.    Mitral Valve: There is mild regurgitation.    Tricuspid Valve: There is trace regurgitation.    Pericardium: There is no pericardial effusion. Left pleural effusion.    Current Heart Failure Medications  furosemide injection 40 mg, Every 12 hours (non-standard times), Intravenous    Plan  - Monitor strict I&Os and daily weights.    - Place on telemetry  - Low sodium diet  - Place on fluid restriction of 1.5 L.   - Cardiology has been consulted  - The patient's volume status is improving but not at their baseline as indicated by edema and shortness of breath  1/26 diuresis in progress, patient negative 2.8 L since admission, patient had reported having episodes of palpitation with associated dyspnea, reported one episode of LOC, no abnormal telemetry reported, with patient being two weeks post CABG consult cardiology, may need repeat echo  1/27/25:  I/O -3780 since admit.  Renal function stable.  Continue diuresis.  Addendum-seen by Cardiology this morning and cleared for discharge.  We will need follow up with his cardiologist after discharge.        Paroxysmal atrial fibrillation  Patient has  persistent (7 days or more) atrial fibrillation. Patient is currently in sinus rhythm. [id number] Score: 2. The patients heart rate in the last 24 hours is as follows:  Pulse  Min: 66  Max: 85     Antiarrhythmics  amiodarone tablet 200 mg, Daily, Oral    Anticoagulants  apixaban tablet 5 mg, 2 times daily, Oral    Plan  - Replete lytes with a goal of K>4, Mg >2  - Patient is anticoagulated, see medications listed above.  - Patient's afib is currently controlled        Graves' disease  Continue home dose methimazole      Stage 3b chronic kidney disease  Creatine stable for now. BMP reviewed- noted Estimated Creatinine Clearance: 31.9 mL/min (A) (based on SCr of 2.1 mg/dL (H)). according to latest data. Based on current GFR, CKD stage is stage 3 - GFR 30-59.  Monitor UOP and serial BMP and adjust therapy as needed. Renally dose meds. Avoid nephrotoxic medications and procedures.      S/P CABG x 4        CAD, multiple vessel  Patient with known CAD s/p  recent quadruple bypass 1/6/25, stent placement, and CABG, which is controlled Will continue ASA, Plavix, and Statin and monitor for S/Sx of angina/ACS. Continue to monitor on telemetry.     Other hyperlipidemia  Lipitor    Primary hypertension  Patient's blood pressure range in the last 24 hours was: BP  Min: 120/69  Max: 146/68.The patient's inpatient anti-hypertensive regimen is listed below:  Current Antihypertensives  furosemide injection 40 mg, Every 12 hours (non-standard times), Intravenous    Plan  - BP is controlled, no changes needed to their regimen  - adjust p.r.n.      Final Active Diagnoses:    Diagnosis Date Noted POA    PRINCIPAL PROBLEM:  Acute on chronic congestive heart failure [I50.9] 01/25/2025 Yes    Paroxysmal atrial fibrillation [I48.0] 01/09/2025 Yes    Stage 3b chronic kidney disease [N18.32] 01/07/2025 Yes    Graves' disease [E05.00] 01/07/2025 Yes    CAD, multiple vessel [I25.10] 01/07/2025 Yes    S/P CABG x 4 [Z95.1] 01/07/2025 Not  Applicable    Primary hypertension [I10] 01/05/2025 Yes    Other hyperlipidemia [E78.49] 01/05/2025 Yes      Problems Resolved During this Admission:       Discharged Condition: good    Disposition: Home or Self Care    Follow Up:   Follow-up Information       No, Primary Doctor Follow up in 3 day(s).               Cornelio Ybarra MD Follow up in 3 day(s).    Specialty: Cardiology  Contact information:  [address]  [facility] [address]  [phone]                           Patient Instructions:      Diet Cardiac       Significant Diagnostic Studies: Labs: All labs within the past 24 hours have been reviewed    Pending Diagnostic Studies:       None           Medications:  Reconciled Home Medications:      Medication List        CHANGE how you take these medications      furosemide 20 MG tablet  Commonly known as: LASIX  Take 1 tablet (20 mg total) by mouth 2 (two) times a day.  What changed: when to take this            CONTINUE taking these medications      amiodarone 200 MG Tab  Commonly known as: PACERONE  Take 1 tablet (200 mg total) by mouth once daily.     apixaban 5 mg Tab  Commonly known as: ELIQUIS  Take 1 tablet (5 mg total) by mouth 2 (two) times daily.     atorvastatin 40 MG tablet  Commonly known as: LIPITOR  Take 1 tablet (40 mg total) by mouth every evening.     clopidogreL 75 mg tablet  Commonly known as: PLAVIX  Take 1 tablet (75 mg total) by mouth once daily.     fish oil-omega-3 fatty acids 300-1,000 mg capsule  Take 1 capsule by mouth once daily.     fluticasone furoate-vilanteroL 100-25 mcg/dose diskus inhaler  Commonly known as: BREO  Inhale 1 puff into the lungs once daily. Controller     isosorbide-hydrALAZINE 20-37.5 mg 20-37.5 mg Tab  Commonly known as: BIDIL  Take 1 tablet by mouth 3 (three) times daily.     methIMAzole 5 MG Tab  Commonly known as: TAPAZOLE  Take 10 mg by mouth once daily.     metoprolol succinate 50 MG 24 hr  tablet  Commonly known as: TOPROL-XL  Take 50 mg by mouth once daily.     NIFEdipine 30 MG (OSM) 24 hr tablet  Commonly known as: PROCARDIA-XL  Take 30 mg by mouth every evening.     nitroGLYCERIN 0.4 MG/DOSE TL SPRY 400 mcg/spray spray  Commonly known as: NITROLINGUAL  Place 1 spray under the tongue every 5 (five) minutes as needed for Chest pain.     oxyCODONE 5 MG immediate release tablet  Commonly known as: ROXICODONE  Take 1 tablet (5 mg total) by mouth every 4 (four) hours as needed for Pain.     tiotropium bromide 2.5 mcg/actuation inhaler  Commonly known as: SPIRIVA RESPIMAT  Inhale 2 puffs into the lungs every evening. Controller              Indwelling Lines/Drains at time of discharge:   Lines/Drains/Airways       Drain  Duration             Male External Urinary Catheter 01/26/25 1520 <1 day                    Time spent on the discharge of patient: 35 minutes         KASEY Layton  Department of Hospital Medicine  [place] - Emergency Department

## 2025-01-27 NOTE — CONSULTS
[facility] - Emergency Department  Cardiology  Consult Note    Patient Name: Leonidas Bautista  Patient : 1950  MRN: [id number]  Admission Date: 2025  Hospital Length of Stay: 1 days  Code Status: Full Code   Attending Provider: Kaleb Collier Jr., MD   Consulting Provider: Diomedes Ennis MD  Primary Care Physician: Ann, Primary Doctor  Principal Problem:Acute on chronic congestive heart failure      Patient information was obtained from patient and ER records.     Inpatient consult to Cardiology  Consult performed by: Diomedes Ennis MD  Consult ordered by: Rohan Mead MD          Chief Complaint: shortness of breath  HPI: Patient is a 61 yo male with CAD s/p 4V CABG on 2025, HFpEF, persistent atrial flutter on amiodarone and Eliquis, moderate CVD, HTN and hyperlipidemia.    Patient was seen by his primary cardiologist on Friday of last week and appeared to be volume overloaded with an acute exacerbation of his HFpEF. He was admitted and provided IV diuretic therapy. He is feeling much better this morning and says that he is back to his baseline.    Of note, patient had a syncopal episode shortly after discharge from the hospital for his CABG. He says that he was making his way to the bathroom when he passed out. He was evaluated at a hospital in [place]. He was noted to have only moderate CVD by a carotid Doppler.              Past Medical History:   Diagnosis Date    Abnormal nuclear stress test 2025    Bilateral carotid artery stenosis 2025    CAD S/P percutaneous coronary angioplasty 2003    Stent mid LAD July 15, 2003    Carotid artery disease     Coronary artery disease involving native coronary artery of native heart without angina pectoris 2025    Diastolic dysfunction     Essential (primary) hypertension     Resistent    Graves disease     hyperthroidism    Hyperlipidemia     Hypertensive heart disease     Other hyperlipidemia 2025    Presence of drug coated  stent in LAD coronary artery 01/05/2025    Primary hypertension 01/05/2025       Past Surgical History:   Procedure Laterality Date    ANGIOGRAPHY OF INTERNAL MAMMARY VESSEL Left 1/6/2025    Procedure: Angiogram Internal Mammary;  Surgeon: Erika Gomes MD;  Location: [facility] CATH LAB;  Service: Cardiology;  Laterality: Left;    ARTERIOGRAPHY OF SUBCLAVIAN ARTERY Left 1/6/2025    Procedure: ARTERIOGRAM, SUBCLAVIAN;  Surgeon: Erika Gomes MD;  Location: [facility] CATH LAB;  Service: Cardiology;  Laterality: Left;    CORONARY ARTERY BYPASS GRAFT (CABG) N/A 1/7/2025    Procedure: CORONARY ARTERY BYPASS GRAFT (CABG);  Surgeon: Sidra Guardado MD;  Location: [facility] OR;  Service: Cardiothoracic;  Laterality: N/A;  CABG x    ECHOCARDIOGRAM,TRANSESOPHAGEAL N/A 1/7/2025    Procedure: ECHOCARDIOGRAM,TRANSESOPHAGEAL;  Surgeon: Sidra Guardado MD;  Location: [facility] OR;  Service: Cardiothoracic;  Laterality: N/A;    ENDOSCOPIC HARVEST OF VEIN Right 1/7/2025    Procedure: SURGICAL PROCUREMENT, VEIN, ENDOSCOPIC;  Surgeon: Sidra Guardado MD;  Location: [facility] OR;  Service: Cardiothoracic;  Laterality: Right;    INJECTION OF ANESTHETIC AGENT AROUND MULTIPLE INTERCOSTAL NERVES N/A 1/7/2025    Procedure: BLOCK, NERVE, INTERCOSTAL, 2 OR MORE;  Surgeon: Sidra Guardado MD;  Location: [facility] OR;  Service: Cardiothoracic;  Laterality: N/A;  Para sternal nerve lock    LEFT HEART CATHETERIZATION Left 1/6/2025    Procedure: Left heart cath;  Surgeon: Erika Gomes MD;  Location: [facility] CATH LAB;  Service: Cardiology;  Laterality: Left;    ptca with Stent mid LAD in 2003 N/A        Review of patient's allergies indicates:  No Known Allergies    No current facility-administered medications on file prior to encounter.     Current Outpatient Medications on File Prior to Encounter   Medication Sig    amiodarone (PACERONE) 200 MG Tab Take 1 tablet (200 mg total) by mouth once daily.    apixaban (ELIQUIS) 5 mg Tab Take 1 tablet (5 mg total) by  mouth 2 (two) times daily.    atorvastatin (LIPITOR) 40 MG tablet Take 1 tablet (40 mg total) by mouth every evening.    clopidogreL (PLAVIX) 75 mg tablet Take 1 tablet (75 mg total) by mouth once daily.    fluticasone furoate-vilanteroL (BREO) 100-25 mcg/dose diskus inhaler Inhale 1 puff into the lungs once daily. Controller    furosemide (LASIX) 20 MG tablet Take 1 tablet (20 mg total) by mouth once daily.    isosorbide-hydrALAZINE 20-37.5 mg (BIDIL) 20-37.5 mg Tab Take 1 tablet by mouth 3 (three) times daily.    methIMAzole (TAPAZOLE) 5 MG Tab Take 10 mg by mouth once daily.    metoprolol succinate (TOPROL-XL) 50 MG 24 hr tablet Take 50 mg by mouth once daily.    NIFEdipine (PROCARDIA-XL) 30 MG (OSM) 24 hr tablet Take 30 mg by mouth every evening.    nitroGLYCERIN 0.4 MG/DOSE TL SPRY (NITROLINGUAL) 400 mcg/spray spray Place 1 spray under the tongue every 5 (five) minutes as needed for Chest pain.    omega-3 fatty acids/fish oil (FISH OIL-OMEGA-3 FATTY ACIDS) 300-1,000 mg capsule Take 1 capsule by mouth once daily.    oxyCODONE (ROXICODONE) 5 MG immediate release tablet Take 1 tablet (5 mg total) by mouth every 4 (four) hours as needed for Pain.    tiotropium bromide (SPIRIVA RESPIMAT) 2.5 mcg/actuation inhaler Inhale 2 puffs into the lungs every evening. Controller     Family History    None       Tobacco Use    Smoking status: Former     Types: Cigarettes     Passive exposure: Past    Smokeless tobacco: Never   Substance and Sexual Activity    Alcohol use: Not Currently    Drug use: Not on file    Sexual activity: Not on file     Review of Systems   Constitutional:  Positive for activity change.   HENT: Negative.     Respiratory:  Positive for shortness of breath.    Cardiovascular: Negative.    Genitourinary: Negative.    Musculoskeletal:  Positive for arthralgias.   Skin:         Bruising to face   Psychiatric/Behavioral: Negative.       Objective:     Vital Signs (Most Recent):  Temp: 98.4 °F (36.9 °C)  (01/27/25 0855)  Pulse: 74 (01/27/25 0855)  Resp: 18 (01/27/25 0855)  BP: 120/69 (01/27/25 0855)  SpO2: 97 % (01/27/25 0855) Vital Signs (24h Range):  Temp:  [97.8 °F (36.6 °C)-98.7 °F (37.1 °C)] 98.4 °F (36.9 °C)  Pulse:  [66-85] 74  Resp:  [10-18] 18  SpO2:  [84 %-99 %] 97 %  BP: (120-146)/(61-76) 120/69     Weight: 77.1 kg (170 lb)  Body mass index is 24.39 kg/m².    SpO2: 97 %         Intake/Output Summary (Last 24 hours) at 1/27/2025 1016  Last data filed at 1/27/2025 0616  Gross per 24 hour   Intake --   Output 1050 ml   Net -1050 ml       Lines/Drains/Airways       Drain  Duration             Male External Urinary Catheter 01/26/25 1520 <1 day              Peripheral Intravenous Line  Duration                  Peripheral IV - Single Lumen 01/24/25 1441 20 G Right Forearm 2 days                    Physical Exam  Constitutional:       Appearance: He is obese.   HENT:      Head: Normocephalic and atraumatic.      Mouth/Throat:      Mouth: Mucous membranes are moist.      Pharynx: Oropharynx is clear.   Eyes:      Extraocular Movements: Extraocular movements intact.      Conjunctiva/sclera: Conjunctivae normal.      Pupils: Pupils are equal, round, and reactive to light.   Cardiovascular:      Rate and Rhythm: Normal rate and regular rhythm.      Pulses: Normal pulses.      Heart sounds: Normal heart sounds.   Pulmonary:      Effort: Pulmonary effort is normal.      Breath sounds: Normal breath sounds.   Abdominal:      General: Abdomen is flat. Bowel sounds are normal.   Musculoskeletal:         General: Normal range of motion.   Skin:     General: Skin is warm and dry.      Capillary Refill: Capillary refill takes 2 to 3 seconds.   Neurological:      General: No focal deficit present.      Mental Status: He is alert and oriented to person, place, and time. Mental status is at baseline.   Psychiatric:         Mood and Affect: Mood normal.         Significant Labs:    Recent Labs   Lab Result Units 01/15/25  8407  01/15/25  1243 01/16/25  0339   Troponin-I ng/mL 0.175* 0.184* 0.154*      Recent Labs   Lab Result Units 01/24/25  1514 01/25/25  1041   Troponin I High Sensitivity ng/L 47* 40*     Recent Lab Results  (Last 5 results in the past 72 hours)        01/27/25  0520   01/26/25  1128   01/26/25  0444   01/25/25  1041   01/24/25  1514        Albumin 3.0     3.0   3.1   3.3       ALP 95     93   96   96       ALT 27     27   29   31       Anion Gap 8     8   8   8       AST 40     35   37   48       Baso # 0.02     0.02   0.01   0.01       Basophil % 0.2     0.2   0.1   0.1       BILIRUBIN TOTAL 0.4  Comment: For infants and newborns, interpretation of results should be based  on gestational age, weight and in agreement with clinical  observations.    Premature Infant recommended reference ranges:  Up to 24 hours.............<8.0 mg/dL  Up to 48 hours............<12.0 mg/dL  3-5 days..................<15.0 mg/dL  6-29 days.................<15.0 mg/dL       0.3  Comment: For infants and newborns, interpretation of results should be based  on gestational age, weight and in agreement with clinical  observations.    Premature Infant recommended reference ranges:  Up to 24 hours.............<8.0 mg/dL  Up to 48 hours............<12.0 mg/dL  3-5 days..................<15.0 mg/dL  6-29 days.................<15.0 mg/dL     0.3  Comment: For infants and newborns, interpretation of results should be based  on gestational age, weight and in agreement with clinical  observations.    Premature Infant recommended reference ranges:  Up to 24 hours.............<8.0 mg/dL  Up to 48 hours............<12.0 mg/dL  3-5 days..................<15.0 mg/dL  6-29 days.................<15.0 mg/dL     0.3  Comment: For infants and newborns, interpretation of results should be based  on gestational age, weight and in agreement with clinical  observations.    Premature Infant recommended reference ranges:  Up to 24 hours.............<8.0 mg/dL  Up to  48 hours............<12.0 mg/dL  3-5 days..................<15.0 mg/dL  6-29 days.................<15.0 mg/dL         BNP   493  Comment: Values of less than 100 pg/ml are consistent with non-CHF populations.             BUN 24     24   25   26       Calcium 8.6     8.4   8.6   8.7       Chloride 92     92   92   94       CO2 34     31   30   27       Creatinine 2.1     2.1   2.0   2.0       Differential Method Automated     Automated   Automated   Automated       eGFR 32.4     32.4   34.4   34.4       Eos # 0.0     0.0   0.0   0.0       Eos % 0.2     0.4   0.5   0.1       Free T4       0.78         Glucose 97     100   94   134       Gran # (ANC) 5.9     6.6   6.9   10.3       Gran % 72.5     73.8   81.2   89.2       Hematocrit 27.4     28.0   27.5   26.9       Hemoglobin 8.9     9.0   8.8   8.5       Immature Grans (Abs) 0.02  Comment: Mild elevation in immature granulocytes is non specific and   can be seen in a variety of conditions including stress response,   acute inflammation, trauma and pregnancy. Correlation with other   laboratory and clinical findings is essential.       0.02  Comment: Mild elevation in immature granulocytes is non specific and   can be seen in a variety of conditions including stress response,   acute inflammation, trauma and pregnancy. Correlation with other   laboratory and clinical findings is essential.     0.02  Comment: Mild elevation in immature granulocytes is non specific and   can be seen in a variety of conditions including stress response,   acute inflammation, trauma and pregnancy. Correlation with other   laboratory and clinical findings is essential.     0.04  Comment: Mild elevation in immature granulocytes is non specific and   can be seen in a variety of conditions including stress response,   acute inflammation, trauma and pregnancy. Correlation with other   laboratory and clinical findings is essential.         Immature Granulocytes 0.2     0.2   0.2   0.3        Lymph # 1.2     1.4   0.9   0.7       Lymph % 15.1     15.4   10.7   6.3       Magnesium  1.9     2.0   2.1         MCH 33.0     32.6   33.1   32.8       MCHC 32.5     32.1   32.0   31.6            101   103   104       Mono # 1.0     0.9   0.6   0.5       Mono % 11.8     10.0   7.3   4.0       MPV 8.8     8.4   8.4   8.6       nRBC 0     0   1   0       NT-proBNP         7792       Platelet Count 443     434   458   498       Potassium 3.9     4.0   4.5   4.5       PROTEIN TOTAL 5.5     5.5   5.8   6.0       RBC 2.70     2.76   2.66   2.59       RDW 13.9     14.0   14.2   14.3       Sodium 134     131   130   129       Troponin I High Sensitivity       40   47       TSH       3.276         WBC 8.15     8.94   8.45   11.56                              Significant Imaging:  Imaging Results              X-Ray Chest AP Portable (Final result)  Result time 01/24/25 16:19:32      Final result by Torres Kern MD (01/24/25 16:19:32)                   Impression:      As above.      Electronically signed by: Torres Kern MD  Date:    01/24/2025  Time:    16:19               Narrative:    EXAMINATION:  XR CHEST AP PORTABLE    CLINICAL HISTORY:  Shortness of breath    COMPARISON:  01/14/2025    FINDINGS:  Cardiac silhouette appears enlarged, unchanged.  There is sternotomy change.  Small bilateral pleural effusions are suspected with associated bibasilar atelectasis/infiltrates.  No acute osseous finding.                                            CCS Functional Classification of Angina:  No symptoms. No angina.    NYHA Functional Classification of Heart Failure: NYHA III    ECHO: 1/15/2025:    Technically difficult study.    Left Ventricle: The left ventricle is normal in size. Increased wall thickness. There is normal systolic function with a visually estimated ejection fraction of 55 - 60%.    Right Ventricle: Right ventricle was not well visualized due to poor acoustic window.    Aortic Valve: There is no stenosis.  There is no significant regurgitation.    Mitral Valve: There is mild regurgitation.    Tricuspid Valve: There is trace regurgitation.    Pericardium: There is no pericardial effusion. Left pleural effusion.    CAROTID: 1/17/25:  The right internal carotid artery is patent with less than 50% stenosis.  The left internal carotid artery is patent with 50-69% stenosis.  Bilateral vertebral arteries are patent with antegrade flow.      MEDICATIONS:     Current Facility-Administered Medications:     amiodarone tablet 200 mg, 200 mg, Oral, Daily, Rohan Mead MD, 200 mg at 01/27/25 0837    apixaban tablet 5 mg, 5 mg, Oral, BID, Rohan Mead MD, 5 mg at 01/27/25 0837    arformoteroL nebulizer solution 15 mcg, 15 mcg, Nebulization, BID, Rohan Mead MD, 15 mcg at 01/27/25 0712    atorvastatin tablet 40 mg, 40 mg, Oral, QHS, Rohan Mead MD, 40 mg at 01/26/25 2041    budesonide nebulizer solution 0.5 mg, 0.5 mg, Nebulization, BID, Rohan Mead MD, 0.5 mg at 01/27/25 0714    clopidogreL tablet 75 mg, 75 mg, Oral, Daily, Rohan Mead MD, 75 mg at 01/27/25 0837    furosemide injection 40 mg, 40 mg, Intravenous, Q12H, Kaleb Hurst MD, 40 mg at 01/27/25 0408    melatonin tablet 6 mg, 6 mg, Oral, Nightly PRN, Kaleb Hurst MD    methIMAzole tablet 10 mg, 10 mg, Oral, Daily, Rohan Mead MD, 10 mg at 01/27/25 0837    sodium chloride 0.9% flush 10 mL, 10 mL, Intravenous, PRN, Kaleb Hurst MD    Current Outpatient Medications:     amiodarone (PACERONE) 200 MG Tab, Take 1 tablet (200 mg total) by mouth once daily., Disp: 30 tablet, Rfl: 11    apixaban (ELIQUIS) 5 mg Tab, Take 1 tablet (5 mg total) by mouth 2 (two) times daily., Disp: 30 tablet, Rfl: 3    atorvastatin (LIPITOR) 40 MG tablet, Take 1 tablet (40 mg total) by mouth every evening., Disp: 90 tablet, Rfl: 3    clopidogreL (PLAVIX) 75 mg tablet, Take 1 tablet (75 mg total) by mouth once daily., Disp: 30 tablet, Rfl: 11    fluticasone  furoate-vilanteroL (BREO) 100-25 mcg/dose diskus inhaler, Inhale 1 puff into the lungs once daily. Controller, Disp: 60 each, Rfl: 1    furosemide (LASIX) 20 MG tablet, Take 1 tablet (20 mg total) by mouth once daily., Disp: 30 tablet, Rfl: 11    isosorbide-hydrALAZINE 20-37.5 mg (BIDIL) 20-37.5 mg Tab, Take 1 tablet by mouth 3 (three) times daily., Disp: , Rfl:     methIMAzole (TAPAZOLE) 5 MG Tab, Take 10 mg by mouth once daily., Disp: , Rfl:     metoprolol succinate (TOPROL-XL) 50 MG 24 hr tablet, Take 50 mg by mouth once daily., Disp: , Rfl:     NIFEdipine (PROCARDIA-XL) 30 MG (OSM) 24 hr tablet, Take 30 mg by mouth every evening., Disp: , Rfl:     nitroGLYCERIN 0.4 MG/DOSE TL SPRY (NITROLINGUAL) 400 mcg/spray spray, Place 1 spray under the tongue every 5 (five) minutes as needed for Chest pain., Disp: , Rfl:     omega-3 fatty acids/fish oil (FISH OIL-OMEGA-3 FATTY ACIDS) 300-1,000 mg capsule, Take 1 capsule by mouth once daily., Disp: , Rfl:     oxyCODONE (ROXICODONE) 5 MG immediate release tablet, Take 1 tablet (5 mg total) by mouth every 4 (four) hours as needed for Pain., Disp: 20 tablet, Rfl: 0    tiotropium bromide (SPIRIVA RESPIMAT) 2.5 mcg/actuation inhaler, Inhale 2 puffs into the lungs every evening. Controller, Disp: 4 g, Rfl: 1      Assessment and Plan:  HFpEF: Patient appears to be euvolemic at present. Continue with low dose oral lasix. OK to discharge to home and f/u with his primary cardiologist.  CAD s/p CABG: He is making a very good recovery s/p CABG. Continue current medical therapy  Persistent atrial fibrillation: HR well controlled on amiodarone and metoprolol. No medication changes at present.  Moderate carotid disease: there is no indication for intervention on his carotid arteries at this time. Repeat carotid in one year.  HTN: hemodynamics are reasonably well controlled  Hyperlipidemia: statin therapy    Disposition: OK to discharge to home today     Active Diagnoses:    Diagnosis Date  Noted POA    PRINCIPAL PROBLEM:  Acute on chronic congestive heart failure [I50.9] 01/25/2025 Yes    Paroxysmal atrial fibrillation [I48.0] 01/09/2025 Yes    Stage 3b chronic kidney disease [N18.32] 01/07/2025 Yes    Graves' disease [E05.00] 01/07/2025 Yes    CAD, multiple vessel [I25.10] 01/07/2025 Yes    S/P CABG x 4 [Z95.1] 01/07/2025 Not Applicable    Primary hypertension [I10] 01/05/2025 Yes    Other hyperlipidemia [E78.49] 01/05/2025 Yes      Problems Resolved During this Admission:       VTE Risk Mitigation (From admission, onward)           Ordered     apixaban tablet 5 mg  2 times daily         01/25/25 0912     IP VTE HIGH RISK PATIENT  Once         01/24/25 1731     Place sequential compression device  Until discontinued         01/24/25 1731                    Thank you for your consult.          Diomedes Ennis MD  Cardiology  [place] [facility] - Emergency Department  01/27/2025

## 2025-01-27 NOTE — ASSESSMENT & PLAN NOTE
Patient has Diastolic (HFpEF) heart failure that is Acute on chronic. Most recent BNP and echo results are listed below.  Recent Labs     01/26/25  1128   *       Latest ECHO  Results for orders placed during the hospital encounter of 01/14/25    Echo    Interpretation Summary    Technically difficult study.    Left Ventricle: The left ventricle is normal in size. Increased wall thickness. There is normal systolic function with a visually estimated ejection fraction of 55 - 60%.    Right Ventricle: Right ventricle was not well visualized due to poor acoustic window.    Aortic Valve: There is no stenosis. There is no significant regurgitation.    Mitral Valve: There is mild regurgitation.    Tricuspid Valve: There is trace regurgitation.    Pericardium: There is no pericardial effusion. Left pleural effusion.    Current Heart Failure Medications  furosemide injection 40 mg, Every 12 hours (non-standard times), Intravenous    Plan  - Monitor strict I&Os and daily weights.    - Place on telemetry  - Low sodium diet  - Place on fluid restriction of 1.5 L.   - Cardiology has been consulted  - The patient's volume status is improving but not at their baseline as indicated by edema and shortness of breath  1/26 diuresis in progress, patient negative 2.8 L since admission, patient had reported having episodes of palpitation with associated dyspnea, reported one episode of LOC, no abnormal telemetry reported, with patient being two weeks post CABG consult cardiology, may need repeat echo  1/27/25:  I/O -3780 since admit.  Renal function stable.  Continue diuresis.  Addendum-seen by Cardiology this morning and cleared for discharge.  We will need follow up with his cardiologist after discharge.

## 2025-01-29 NOTE — DISCHARGE SUMMARY
[facility]  Hospital Medicine Discharge Summary    Admit Date: 1/14/2025  Discharge Date and Time: 01/17/2025  Admitting Physician:  Team  Discharging Physician: Jeremiah Palacios MD.  Primary Care Physician: Ann, Primary Doctor  Consults: Hospital Medicine    Discharge Diagnoses:  Syncope and Collapse  Left sided facial/ left orbital floor fracture due to fall  Paroxysmal atrial fibrillation with CVR  Elevated troponin / NSTEMI type 2 in the setting of PAF and recent CABG  Acute on chronic diastolic heart failure, LVEF 55-60%  Anemia of chronic disease with mild macrocytosis, moderate   Chronic kidney disease, stage IIIb  Current everyday smoker / Marijuana use      Hx- HTN, HLD, CAD s/p prior stent, recent CABG 4 vessel on 1/7/25 on ASA/Plavix, current smoker, COPD , Carotid artery disease, Thyroid disease/ Grave's disease, CHF, Pul HTN, PAD, CKD 3b, PAF    Hospital Course:   73 yo male with PMHx of HTN, HLD, CAD s/p prior stent, recent CABG 4 vessel on 1/7/25 on ASA/Plavix, current smoker, COPD , Carotid artery disease, Thyroid disease/ Grave's disease, CHF, Pul HTN, PAD, CKD 3b, PAF on Eliquis presented to the ED as a level 2 trauma following a fall at home. Reportedly Pt was sitting in the bed and was having palpitation. When stood up he fell forward and face down on the floor. Subsequently developed nose bleed and left periorbital swelling with blur vision and associated confusion. Pt with h/o new onset PAF following CABG on 1/7/25 at [facility]. C/O increased SOB on exertion and episodes of palpitation since being discharged from OSH following CABG. Denies associated chest pain, nausea, vomiting or diaphoresis. EMS reported hypotension en route otherwise GCS was 15. Upon arrival to the ED Pt was hemodynamically stable with /78, HR 73, RR 20, SpO2 96% and afebrile. EKG with afib, non specific T wave abnormality and prolonged QT. CT head with left orbit floor fracture  with injury to the left inferior rectus muscle, left maxillary hemosinus, left periorbital subcutaneous emphysema and associated air foci within the left orbit and left maxillary sinus, no intra cranial process. CT C spine with no fracture, CT chest abd pelvis with evidence of pul vascular congestion, small eliu pleural effusion, scanty expected pneumomediastinum post recent CABG, and no pelvic fracture identified. Labs showed leukocytosis, Hgb 8.0, Cr 1.6, Na 139, K 4.8, , Troponin 0.203>0.184, lactic 1.1, UDS positive for marijuana, UA without infection. Pt was evaluated by Trauma surgery and subsequently admitted to  service. Cardiology and Plastic Surgery were consulted. Plastics recommended non operative management of left facial/ orbital floor fracture. SLP cleared Pt for regular consistency diet. Cardiology recommended telemetry, echocardiogram, trend troponin, diuresis as indicated and continue current home meds as appropriate.  Echocardiogram showed EF 55-60%, mild MR, trace TR.  Plastic surgery signed off due to nonoperative fractures.  Trauma surgery signed off.  PT/OT consulted; recommended low intensity therapy. Vascular Surgery consulted due to US Carotids showing L ICA stenosis; plan for outpatient follow up.    Pt was seen and examined on the day of discharge. Denied any new complaints. Plan for DC with HH.    Vitals:  VITAL SIGNS: 24 HRS MIN & MAX LAST   No data recorded 98 °F (36.7 °C)   No data recorded 115/67   No data recorded  73   No data recorded 20   No data recorded (!) 93 %     Physical Exam:  General: In no acute distress, afebrile; left periorbital ecchymosis  Chest: Clear to auscultation bilaterally  Heart: RRR, +S1, S2, no appreciable murmur  Abdomen: Soft, nontender, BS +  MSK: Warm, no lower extremity edema, no clubbing or cyanosis  Neurologic: Alert and oriented x4, Cranial nerve II-XII intact, Strength 5/5 in all 4 extremities    Procedures Performed: No admission  procedures for hospital encounter.     Significant Diagnostic Studies: See Full reports for all details    Recent Labs   Lab 01/25/25  1041 01/26/25  0444 01/27/25  0520   WBC 8.45 8.94 8.15   RBC 2.66* 2.76* 2.70*   HGB 8.8* 9.0* 8.9*   HCT 27.5* 28.0* 27.4*   * 101* 102*   MCH 33.1* 32.6* 33.0*   MCHC 32.0 32.1 32.5   RDW 14.2 14.0 13.9   * 434 443   MPV 8.4* 8.4* 8.8*   GRAN 81.2*  6.9 73.8*  6.6 72.5  5.9   LYMPH 10.7*  0.9* 15.4*  1.4 15.1*  1.2   MONO 7.3  0.6 10.0  0.9 11.8  1.0   BASO 0.01 0.02 0.02   NRBC 1* 0 0       Recent Labs   Lab 01/25/25  1041 01/26/25  0444 01/27/25  0520   * 131* 134*   K 4.5 4.0 3.9   CL 92* 92* 92*   CO2 30* 31* 34*   ANIONGAP 8 8 8   BUN 25* 24* 24*   CREATININE 2.0* 2.1* 2.1*   GLU 94 100 97   CALCIUM 8.6* 8.4* 8.6*   MG 2.1 2.0 1.9   ALBUMIN 3.1* 3.0* 3.0*   PROT 5.8* 5.5* 5.5*   ALKPHOS 96 93 95   ALT 29 27 27   AST 37 35 40   BILITOT 0.3 0.3 0.4        Microbiology Results (last 7 days)       ** No results found for the last 168 hours. **             X-Ray Chest AP Portable  Narrative: EXAMINATION:  XR CHEST AP PORTABLE    CLINICAL HISTORY:  Shortness of breath    COMPARISON:  01/14/2025    FINDINGS:  Cardiac silhouette appears enlarged, unchanged.  There is sternotomy change.  Small bilateral pleural effusions are suspected with associated bibasilar atelectasis/infiltrates.  No acute osseous finding.  Impression: As above.    Electronically signed by: Torres Kern MD  Date:    01/24/2025  Time:    16:19         Medication List        START taking these medications      fluticasone furoate-vilanteroL 100-25 mcg/dose diskus inhaler  Commonly known as: BREO  Inhale 1 puff into the lungs once daily. Controller     tiotropium bromide 2.5 mcg/actuation inhaler  Commonly known as: SPIRIVA RESPIMAT  Inhale 2 puffs into the lungs every evening. Controller            CONTINUE taking these medications      amiodarone 200 MG Tab  Commonly known as:  PACERONE  Take 1 tablet (200 mg total) by mouth once daily.     apixaban 5 mg Tab  Commonly known as: ELIQUIS  Take 1 tablet (5 mg total) by mouth 2 (two) times daily.     atorvastatin 40 MG tablet  Commonly known as: LIPITOR  Take 1 tablet (40 mg total) by mouth every evening.     clopidogreL 75 mg tablet  Commonly known as: PLAVIX  Take 1 tablet (75 mg total) by mouth once daily.     fish oil-omega-3 fatty acids 300-1,000 mg capsule     isosorbide-hydrALAZINE 20-37.5 mg 20-37.5 mg Tab  Commonly known as: BIDIL     methIMAzole 5 MG Tab  Commonly known as: TAPAZOLE     metoprolol succinate 50 MG 24 hr tablet  Commonly known as: TOPROL-XL     NIFEdipine 30 MG (OSM) 24 hr tablet  Commonly known as: PROCARDIA-XL     nitroGLYCERIN 0.4 MG/DOSE TL SPRY 400 mcg/spray spray  Commonly known as: NITROLINGUAL     oxyCODONE 5 MG immediate release tablet  Commonly known as: ROXICODONE  Take 1 tablet (5 mg total) by mouth every 4 (four) hours as needed for Pain.            STOP taking these medications      aspirin 81 MG EC tablet  Commonly known as: ECOTRIN     losartan 100 MG tablet  Commonly known as: COZAAR               Where to Get Your Medications        These medications were sent to [facility]/pharmacy #[id number] - [facility] [address] [address]  [address], [facility] [address]      Phone: [phone]   fluticasone furoate-vilanteroL 100-25 mcg/dose diskus inhaler  tiotropium bromide 2.5 mcg/actuation inhaler          Explained in detail to the patient about the discharge plan, medications, and follow-up visits. Pt understands and agrees with the treatment plan  Discharge Disposition: Home or Self Care   Discharged Condition: stable  Diet-    Medications Per DC med rec  Activities as tolerated    For further questions contact hospitalist office    Discharge time 33 minutes    For worsening symptoms, chest pain, shortness of breath, increased abdominal pain, high grade fever, stroke or stroke like symptoms, immediately go to the  nearest Emergency Room or call 911 as soon as possible.      Jeremiah Dial M.D.

## 2025-01-31 NOTE — PHYSICIAN QUERY
Provider, please provide the diagnosis associated with the clinical findings.    Other (please specify): Atrial flutter with rvr

## 2025-01-31 NOTE — PHYSICIAN QUERY
Provider due to conflicting documentation, please clarify the type of atrial fibrillation.     Paroxysmal atrial fibrillation

## 2025-02-11 NOTE — PHYSICIAN QUERY
Question: Please clarify the Cardiac diagnosis.    Provider Query Response:  Type 4 MI, due to restenosis associated with PCI

## 2025-02-17 PROBLEM — Z76.89 ENCOUNTER TO ESTABLISH CARE: Status: ACTIVE | Noted: 2025-02-17

## 2025-03-03 ENCOUNTER — TELEPHONE (OUTPATIENT)
Dept: CARDIOTHORACIC SURGERY | Facility: CLINIC | Age: 75
End: 2025-03-03
Payer: MEDICARE

## 2025-03-03 NOTE — TELEPHONE ENCOUNTER
LVM for daughter to have pt return call to schedule appointment.    Pt number and brothers numbers are not working numbers        ----- Message from Sidra Guardado MD sent at 3/3/2025 11:58 AM CST -----  Can You please give him a follow up with me post cabgSM

## (undated) DEVICE — BLADE KNIFE UNITOME 4.0MM

## (undated) DEVICE — CATH JL4 5FR

## (undated) DEVICE — SUT SILK 2-0 SH 18IN BLACK

## (undated) DEVICE — BLANKET HYPOTHERMIA 25X64IN

## (undated) DEVICE — CONNECTOR STRAIGHT 1/4X1/4IN

## (undated) DEVICE — ELECTRODE REM PLYHSV RETURN 9

## (undated) DEVICE — CATH INFINITI MULTIPAK JR4 5FR

## (undated) DEVICE — CATH JL5 5FR

## (undated) DEVICE — Device

## (undated) DEVICE — CANNULA VENOUS MC2X 29FR

## (undated) DEVICE — GUIDEWIRE WHOLEY HI TORQ 175CM

## (undated) DEVICE — CELL SAVER 4/CASE

## (undated) DEVICE — SUT 7/0 24IN PROLENE BL MO

## (undated) DEVICE — GOWN POLY REINF BRTH SLV XL

## (undated) DEVICE — COUNTER BDL BLADEGUARD LI DBL

## (undated) DEVICE — BAND TR COMP DEVICE REG 24CM

## (undated) DEVICE — PUNCH AORTIC SHORT 4.4MM

## (undated) DEVICE — GUIDEWIRE EMERALD .035IN 260CM

## (undated) DEVICE — APPLIER CLIP LIAGCLIP 9.375IN

## (undated) DEVICE — CLIP STEALTH LATIS 1/4 FORCE 6

## (undated) DEVICE — CANNULA AG CARDPLG 14G FLANGE

## (undated) DEVICE — SUT PROLENE 4-0 RB-1 BL MO

## (undated) DEVICE — COVER TABLE 77 X 96

## (undated) DEVICE — CANNULA IMA 1MM

## (undated) DEVICE — OMNIPAQUE 300MG 150ML VIAL

## (undated) DEVICE — EVACUATOR WOUND BULB 100CC

## (undated) DEVICE — PACK HEART CATH BR

## (undated) DEVICE — STAPLER SKIN REGULAR

## (undated) DEVICE — CANNULA VSL W/DUCKBILL

## (undated) DEVICE — DRAPE SLUSH WARMER WITH DISC

## (undated) DEVICE — DRESSING MEPORE ISLAND 31/2X4

## (undated) DEVICE — SET CARDIOPLEGIA DEL

## (undated) DEVICE — SET SUCTION ANTICOAGULANT

## (undated) DEVICE — PERFUSION FX X-COATED

## (undated) DEVICE — TRAY CATH 1-LYR URIMTR TMP 16F

## (undated) DEVICE — PACK OPEN HEART SC BR

## (undated) DEVICE — DECANTER FLUID TRNSF WHITE 9IN

## (undated) DEVICE — SOL IRRI STRL WATER 1000ML

## (undated) DEVICE — SOL NORMAL USPCA 0.9%

## (undated) DEVICE — SYR 30CC LUER LOCK

## (undated) DEVICE — CATH JR4 5FR

## (undated) DEVICE — SUT STEEL 7 MONO B&S18 CCS

## (undated) DEVICE — RETRACTOR OCTOBASE INSERT HOLD

## (undated) DEVICE — BANDAGE ROLL COTTN 4.5INX4.1YD

## (undated) DEVICE — BANDAGE ACE DOUBLE STER 6IN

## (undated) DEVICE — CONNECTOR 3/8X3/8 STERILE

## (undated) DEVICE — DRESSING MEPORE ADH 3.5X12

## (undated) DEVICE — SUT PLEDGET LG SOFT

## (undated) DEVICE — GOWN POLY REINF X-LONG 2XL

## (undated) DEVICE — ELECTRODE BLADE E-Z CLEAN 4IN

## (undated) DEVICE — BOWL CELL SAVER 5 225ML

## (undated) DEVICE — INSERT STEALTH SURGICAL CLAMP

## (undated) DEVICE — TAPE SILK 3IN

## (undated) DEVICE — ANGIOTOUCH KIT

## (undated) DEVICE — SET PERFUSION

## (undated) DEVICE — SPONGE COTTON TRAY 4X4IN

## (undated) DEVICE — CONTAINER SPECIMEN OR STER 4OZ

## (undated) DEVICE — KIT GLIDESHEATH SLEND 6FR 10CM

## (undated) DEVICE — SENSORS CDI

## (undated) DEVICE — BLADE SCALP OPHTL BEVEL STR

## (undated) DEVICE — ORGNZR TUBING CLR W/CLIPS

## (undated) DEVICE — SUT VICRYL 1 OB 36 CTX

## (undated) DEVICE — SUT ETHBND EXCEL 2-0 RB-1 30IN

## (undated) DEVICE — GOWN POLY REINF X-LONG XL

## (undated) DEVICE — NDL ECLIPSE SAFETY 23G 1.5IN

## (undated) DEVICE — COVER LIGHT HANDLE 80/CA

## (undated) DEVICE — SPONGE LAP 18X18 PREWASHED

## (undated) DEVICE — DRAIN CHEST DRY SUCTION

## (undated) DEVICE — SUT PROLENE 6-0 C-1 30IN BL

## (undated) DEVICE — TUBING MEDI-VAC 20FT .25IN

## (undated) DEVICE — SUT SILK 1 BLK BRAID SA87G

## (undated) DEVICE — PAD DEFIB CADENCE ADULT R2

## (undated) DEVICE — TOWEL OR DISP STRL BLUE 4/PK

## (undated) DEVICE — DRAIN CHAN RND HUBLS 8MM 24FR

## (undated) DEVICE — APPLIER LIGACLIP SM 9.38IN

## (undated) DEVICE — CATH PIG145 INFINITI 5X110CM

## (undated) DEVICE — SET PNEUMOCLEAR HEAT HUM SE HF

## (undated) DEVICE — COVER MAYO STND XL 30X57IN

## (undated) DEVICE — DRAPE ANGIO BRACH 38X44IN

## (undated) DEVICE — CATH UROLOGICAL 18FR RED

## (undated) DEVICE — DRAIN CHANNEL ROUND 19FR

## (undated) DEVICE — SYS VIRTUOSAPH PLUS EVM

## (undated) DEVICE — SOL NACL IRR 1000ML BTL

## (undated) DEVICE — SUT VICRYL 2-0 36 CT-1

## (undated) DEVICE — CATH INFINITI VESTAN 5FR 100CM

## (undated) DEVICE — KIT MANIFOLD LOW PRESS TUBING

## (undated) DEVICE — SUT PERMA HAND SILK BLK 0-0

## (undated) DEVICE — SOL PLASMALYTE PH 7.4 1000ML